# Patient Record
Sex: FEMALE | Race: OTHER | HISPANIC OR LATINO | URBAN - METROPOLITAN AREA
[De-identification: names, ages, dates, MRNs, and addresses within clinical notes are randomized per-mention and may not be internally consistent; named-entity substitution may affect disease eponyms.]

---

## 2017-03-25 ENCOUNTER — EMERGENCY (EMERGENCY)
Facility: HOSPITAL | Age: 42
LOS: 1 days | Discharge: HOME | End: 2017-03-25
Attending: EMERGENCY MEDICINE
Payer: MEDICAID

## 2017-03-25 VITALS
DIASTOLIC BLOOD PRESSURE: 71 MMHG | HEART RATE: 98 BPM | TEMPERATURE: 99 F | SYSTOLIC BLOOD PRESSURE: 135 MMHG | OXYGEN SATURATION: 98 % | RESPIRATION RATE: 18 BRPM

## 2017-03-25 VITALS
HEART RATE: 93 BPM | DIASTOLIC BLOOD PRESSURE: 81 MMHG | RESPIRATION RATE: 20 BRPM | SYSTOLIC BLOOD PRESSURE: 132 MMHG | TEMPERATURE: 98 F | WEIGHT: 190.04 LBS | OXYGEN SATURATION: 100 % | HEIGHT: 69 IN

## 2017-03-25 LAB
ALBUMIN SERPL ELPH-MCNC: 4 G/DL — SIGNIFICANT CHANGE UP (ref 3.5–5)
ALP SERPL-CCNC: 52 U/L — SIGNIFICANT CHANGE UP (ref 40–120)
ALT FLD-CCNC: 23 U/L DA — SIGNIFICANT CHANGE UP (ref 10–60)
ANION GAP SERPL CALC-SCNC: 8 MMOL/L — SIGNIFICANT CHANGE UP (ref 5–17)
APTT BLD: 25.6 SEC — LOW (ref 27.5–37.4)
AST SERPL-CCNC: 20 U/L — SIGNIFICANT CHANGE UP (ref 10–40)
BASOPHILS # BLD AUTO: 0.1 K/UL — SIGNIFICANT CHANGE UP (ref 0–0.2)
BASOPHILS NFR BLD AUTO: 1 % — SIGNIFICANT CHANGE UP (ref 0–2)
BILIRUB SERPL-MCNC: 0.2 MG/DL — SIGNIFICANT CHANGE UP (ref 0.2–1.2)
BUN SERPL-MCNC: 13 MG/DL — SIGNIFICANT CHANGE UP (ref 7–18)
CALCIUM SERPL-MCNC: 9.3 MG/DL — SIGNIFICANT CHANGE UP (ref 8.4–10.5)
CHLORIDE SERPL-SCNC: 107 MMOL/L — SIGNIFICANT CHANGE UP (ref 96–108)
CO2 SERPL-SCNC: 27 MMOL/L — SIGNIFICANT CHANGE UP (ref 22–31)
CREAT SERPL-MCNC: 0.89 MG/DL — SIGNIFICANT CHANGE UP (ref 0.5–1.3)
EOSINOPHIL # BLD AUTO: 0 K/UL — SIGNIFICANT CHANGE UP (ref 0–0.5)
EOSINOPHIL NFR BLD AUTO: 0.5 % — SIGNIFICANT CHANGE UP (ref 0–6)
GLUCOSE SERPL-MCNC: 127 MG/DL — HIGH (ref 70–99)
HCT VFR BLD CALC: 36.9 % — SIGNIFICANT CHANGE UP (ref 34.5–45)
HGB BLD-MCNC: 11.3 G/DL — LOW (ref 11.5–15.5)
INR BLD: 1.11 RATIO — SIGNIFICANT CHANGE UP (ref 0.88–1.16)
LYMPHOCYTES # BLD AUTO: 0.5 K/UL — LOW (ref 1–3.3)
LYMPHOCYTES # BLD AUTO: 10.6 % — LOW (ref 13–44)
MCHC RBC-ENTMCNC: 25.5 PG — LOW (ref 27–34)
MCHC RBC-ENTMCNC: 30.7 GM/DL — LOW (ref 32–36)
MCV RBC AUTO: 82.9 FL — SIGNIFICANT CHANGE UP (ref 80–100)
MONOCYTES # BLD AUTO: 0.3 K/UL — SIGNIFICANT CHANGE UP (ref 0–0.9)
MONOCYTES NFR BLD AUTO: 5.7 % — SIGNIFICANT CHANGE UP (ref 2–14)
NEUTROPHILS # BLD AUTO: 4.2 K/UL — SIGNIFICANT CHANGE UP (ref 1.8–7.4)
NEUTROPHILS NFR BLD AUTO: 82.1 % — HIGH (ref 43–77)
PLATELET # BLD AUTO: 210 K/UL — SIGNIFICANT CHANGE UP (ref 150–400)
POTASSIUM SERPL-MCNC: 3.7 MMOL/L — SIGNIFICANT CHANGE UP (ref 3.5–5.3)
POTASSIUM SERPL-SCNC: 3.7 MMOL/L — SIGNIFICANT CHANGE UP (ref 3.5–5.3)
PROT SERPL-MCNC: 7.6 G/DL — SIGNIFICANT CHANGE UP (ref 6–8.3)
PROTHROM AB SERPL-ACNC: 12.1 SEC — SIGNIFICANT CHANGE UP (ref 9.8–12.7)
RAPID RVP RESULT: SIGNIFICANT CHANGE UP
RBC # BLD: 4.45 M/UL — SIGNIFICANT CHANGE UP (ref 3.8–5.2)
RBC # FLD: 15.5 % — HIGH (ref 10.3–14.5)
SODIUM SERPL-SCNC: 142 MMOL/L — SIGNIFICANT CHANGE UP (ref 135–145)
WBC # BLD: 5.1 K/UL — SIGNIFICANT CHANGE UP (ref 3.8–10.5)
WBC # FLD AUTO: 5.1 K/UL — SIGNIFICANT CHANGE UP (ref 3.8–10.5)

## 2017-03-25 PROCEDURE — 99285 EMERGENCY DEPT VISIT HI MDM: CPT

## 2017-03-25 PROCEDURE — 71020: CPT | Mod: 26

## 2017-03-25 RX ORDER — IPRATROPIUM/ALBUTEROL SULFATE 18-103MCG
3 AEROSOL WITH ADAPTER (GRAM) INHALATION ONCE
Qty: 0 | Refills: 0 | Status: COMPLETED | OUTPATIENT
Start: 2017-03-25 | End: 2017-03-25

## 2017-03-25 RX ORDER — MAGNESIUM SULFATE 500 MG/ML
2 VIAL (ML) INJECTION ONCE
Qty: 0 | Refills: 0 | Status: COMPLETED | OUTPATIENT
Start: 2017-03-25 | End: 2017-03-25

## 2017-03-25 RX ADMIN — Medication 3 MILLILITER(S): at 21:18

## 2017-03-25 RX ADMIN — Medication 50 GRAM(S): at 21:18

## 2017-03-25 RX ADMIN — Medication 60 MILLIGRAM(S): at 21:18

## 2017-03-25 RX ADMIN — Medication 3 MILLILITER(S): at 21:19

## 2017-03-25 NOTE — ED PROVIDER NOTE - OBJECTIVE STATEMENT
42 y/o F w/ PMHx of Asthma (never intubated; uses Albuterol) presents to the ED c/o difficulty breathing onset yesterday. Pt also notes coughing, wheezing, & fever. Pt [facility] where she was given a nebulizer, 4 mg of Decadron & sent to ED. Pt is A&Ox3 in ED. Pt denies chest pain, leg swelling, oral contraceptive use, recent travel, positive sick contact or any other complaints. NKDA.

## 2017-03-25 NOTE — ED ADULT NURSE NOTE - OBJECTIVE STATEMENT
Patient complain of difficulty breathing since last night. Went to urgent care today and was sent to ED. Wheezing on arrival, complain of SOB and chest discomfort.

## 2017-03-25 NOTE — ED PROVIDER NOTE - NS ED MD SCRIBE ATTENDING SCRIBE SECTIONS
REVIEW OF SYSTEMS/PHYSICAL EXAM/HISTORY OF PRESENT ILLNESS/VITAL SIGNS( Pullset)/PAST MEDICAL/SURGICAL/SOCIAL HISTORY/HIV/DISPOSITION

## 2017-03-25 NOTE — ED PROVIDER NOTE - MEDICAL DECISION MAKING DETAILS
42 y/o F w/ difficulty breathing onset today. Will get labs, flu panel, CXR, duo-neb, steroids, & DC home.

## 2017-03-26 PROCEDURE — 87633 RESP VIRUS 12-25 TARGETS: CPT

## 2017-03-26 PROCEDURE — 87486 CHLMYD PNEUM DNA AMP PROBE: CPT

## 2017-03-26 PROCEDURE — 87581 M.PNEUMON DNA AMP PROBE: CPT

## 2017-03-26 PROCEDURE — 80053 COMPREHEN METABOLIC PANEL: CPT

## 2017-03-26 PROCEDURE — 85027 COMPLETE CBC AUTOMATED: CPT

## 2017-03-26 PROCEDURE — 85730 THROMBOPLASTIN TIME PARTIAL: CPT

## 2017-03-26 PROCEDURE — 94640 AIRWAY INHALATION TREATMENT: CPT

## 2017-03-26 PROCEDURE — 87798 DETECT AGENT NOS DNA AMP: CPT

## 2017-03-26 PROCEDURE — 99284 EMERGENCY DEPT VISIT MOD MDM: CPT | Mod: 25

## 2017-03-26 PROCEDURE — 71046 X-RAY EXAM CHEST 2 VIEWS: CPT

## 2017-03-26 PROCEDURE — 96374 THER/PROPH/DIAG INJ IV PUSH: CPT

## 2017-03-26 PROCEDURE — 85610 PROTHROMBIN TIME: CPT

## 2017-03-26 RX ORDER — PSEUDOEPHEDRINE HCL 30 MG
2 TABLET ORAL
Qty: 24 | Refills: 0
Start: 2017-03-26 | End: 2017-03-31

## 2017-03-26 RX ORDER — IBUPROFEN 200 MG
400 TABLET ORAL ONCE
Qty: 0 | Refills: 0 | Status: COMPLETED | OUTPATIENT
Start: 2017-03-26 | End: 2017-03-26

## 2017-03-26 RX ORDER — IBUPROFEN 200 MG
2 TABLET ORAL
Qty: 20 | Refills: 0
Start: 2017-03-26 | End: 2017-03-31

## 2017-03-26 RX ORDER — PSEUDOEPHEDRINE HCL 30 MG
60 TABLET ORAL ONCE
Qty: 0 | Refills: 0 | Status: COMPLETED | OUTPATIENT
Start: 2017-03-26 | End: 2017-03-26

## 2017-03-26 RX ORDER — DIPHENHYDRAMINE HCL 50 MG
25 CAPSULE ORAL ONCE
Qty: 0 | Refills: 0 | Status: COMPLETED | OUTPATIENT
Start: 2017-03-26 | End: 2017-03-26

## 2017-03-26 RX ORDER — DIPHENHYDRAMINE HCL 50 MG
1 CAPSULE ORAL
Qty: 12 | Refills: 0
Start: 2017-03-26 | End: 2017-03-31

## 2017-03-26 RX ORDER — ALBUTEROL 90 UG/1
2 AEROSOL, METERED ORAL
Qty: 1 | Refills: 0
Start: 2017-03-26 | End: 2017-04-25

## 2017-03-26 RX ADMIN — Medication 60 MILLIGRAM(S): at 01:06

## 2017-03-26 RX ADMIN — Medication 25 MILLIGRAM(S): at 01:12

## 2017-03-26 RX ADMIN — Medication 400 MILLIGRAM(S): at 01:06

## 2017-03-26 RX ADMIN — Medication 100 MILLIGRAM(S): at 01:09

## 2017-03-29 DIAGNOSIS — J06.9 ACUTE UPPER RESPIRATORY INFECTION, UNSPECIFIED: ICD-10-CM

## 2017-03-29 DIAGNOSIS — J45.901 UNSPECIFIED ASTHMA WITH (ACUTE) EXACERBATION: ICD-10-CM

## 2017-03-31 ENCOUNTER — APPOINTMENT (OUTPATIENT)
Dept: INTERNAL MEDICINE | Facility: CLINIC | Age: 42
End: 2017-03-31

## 2017-03-31 VITALS
HEIGHT: 69 IN | SYSTOLIC BLOOD PRESSURE: 120 MMHG | WEIGHT: 186 LBS | BODY MASS INDEX: 27.55 KG/M2 | HEART RATE: 105 BPM | DIASTOLIC BLOOD PRESSURE: 78 MMHG | RESPIRATION RATE: 17 BRPM | TEMPERATURE: 98.8 F

## 2017-03-31 RX ORDER — PREDNISONE 50 MG/1
50 TABLET ORAL
Qty: 5 | Refills: 0 | Status: DISCONTINUED | COMMUNITY
Start: 2017-03-26 | End: 2017-03-31

## 2017-03-31 RX ORDER — CEFUROXIME AXETIL 250 MG/1
250 TABLET ORAL
Qty: 6 | Refills: 0 | Status: DISCONTINUED | COMMUNITY
Start: 2017-03-30 | End: 2017-03-31

## 2017-03-31 RX ORDER — IPRATROPIUM BROMIDE AND ALBUTEROL SULFATE 2.5; .5 MG/3ML; MG/3ML
0.5-2.5 (3) SOLUTION RESPIRATORY (INHALATION)
Qty: 360 | Refills: 0 | Status: DISCONTINUED | COMMUNITY
Start: 2017-03-30 | End: 2017-03-31

## 2017-03-31 RX ORDER — IBUPROFEN 200 MG
200 TABLET ORAL
Qty: 20 | Refills: 0 | Status: DISCONTINUED | COMMUNITY
Start: 2017-03-26 | End: 2017-03-31

## 2017-03-31 RX ORDER — PSEUDOEPHEDRINE HCL 30 MG/1
30 TABLET, FILM COATED ORAL
Qty: 24 | Refills: 0 | Status: DISCONTINUED | COMMUNITY
Start: 2017-03-26 | End: 2017-03-31

## 2017-04-21 ENCOUNTER — APPOINTMENT (OUTPATIENT)
Dept: INTERNAL MEDICINE | Facility: CLINIC | Age: 42
End: 2017-04-21

## 2017-05-02 ENCOUNTER — APPOINTMENT (OUTPATIENT)
Dept: INTERNAL MEDICINE | Facility: CLINIC | Age: 42
End: 2017-05-02

## 2017-05-02 VITALS
HEIGHT: 69 IN | TEMPERATURE: 98.1 F | RESPIRATION RATE: 18 BRPM | WEIGHT: 190 LBS | SYSTOLIC BLOOD PRESSURE: 120 MMHG | DIASTOLIC BLOOD PRESSURE: 70 MMHG | HEART RATE: 71 BPM | BODY MASS INDEX: 28.14 KG/M2 | OXYGEN SATURATION: 99 %

## 2017-05-02 RX ORDER — OSELTAMIVIR PHOSPHATE 75 MG/1
75 CAPSULE ORAL
Qty: 3 | Refills: 0 | Status: DISCONTINUED | COMMUNITY
Start: 2017-03-30 | End: 2017-05-02

## 2017-05-02 RX ORDER — AZITHROMYCIN 250 MG/1
250 TABLET, FILM COATED ORAL
Qty: 1 | Refills: 0 | Status: DISCONTINUED | COMMUNITY
Start: 2017-03-30 | End: 2017-05-02

## 2017-05-02 RX ORDER — PREDNISONE 10 MG/1
10 TABLET ORAL
Qty: 21 | Refills: 0 | Status: DISCONTINUED | COMMUNITY
Start: 2017-03-31 | End: 2017-05-02

## 2017-05-02 RX ORDER — BENZONATATE 100 MG/1
100 CAPSULE ORAL
Qty: 20 | Refills: 0 | Status: DISCONTINUED | COMMUNITY
Start: 2017-03-26 | End: 2017-05-02

## 2017-05-02 RX ORDER — DEXTROMETHORPHAN HYDROBROMIDE AND GUAIFENESIN 10; 100 MG/5ML; MG/5ML
100-10 SOLUTION ORAL
Qty: 210 | Refills: 2 | Status: DISCONTINUED | COMMUNITY
Start: 2017-03-31 | End: 2017-05-02

## 2017-07-07 ENCOUNTER — APPOINTMENT (OUTPATIENT)
Dept: PULMONOLOGY | Facility: CLINIC | Age: 42
End: 2017-07-07

## 2017-07-19 ENCOUNTER — CLINICAL ADVICE (OUTPATIENT)
Age: 42
End: 2017-07-19

## 2017-07-19 RX ORDER — METHYLPREDNISOLONE 4 MG/1
4 TABLET ORAL
Qty: 21 | Refills: 0 | Status: DISCONTINUED | COMMUNITY
Start: 2017-04-02 | End: 2017-07-19

## 2017-07-19 RX ORDER — AZITHROMYCIN 500 MG/1
500 TABLET, FILM COATED ORAL
Qty: 3 | Refills: 0 | Status: DISCONTINUED | COMMUNITY
Start: 2017-04-02 | End: 2017-07-19

## 2017-07-19 RX ORDER — EFINACONAZOLE 100 MG/ML
10 SOLUTION TOPICAL
Qty: 4 | Refills: 0 | Status: DISCONTINUED | COMMUNITY
Start: 2017-05-16 | End: 2017-07-19

## 2017-07-19 RX ORDER — POLYETHYLENE GLYCOL 3350 AND ELECTROLYTES WITH LEMON FLAVOR 236; 22.74; 6.74; 5.86; 2.97 G/4L; G/4L; G/4L; G/4L; G/4L
236 POWDER, FOR SOLUTION ORAL
Qty: 4000 | Refills: 0 | Status: DISCONTINUED | COMMUNITY
Start: 2017-01-26 | End: 2017-07-19

## 2017-07-19 RX ORDER — BISACODYL 5 MG/1
5 TABLET ORAL
Qty: 4 | Refills: 0 | Status: DISCONTINUED | COMMUNITY
Start: 2017-01-26 | End: 2017-07-19

## 2017-07-19 RX ORDER — LULICONAZOLE 10 MG/G
1 CREAM TOPICAL
Qty: 60 | Refills: 0 | Status: DISCONTINUED | COMMUNITY
Start: 2017-05-16 | End: 2017-07-19

## 2017-07-19 RX ORDER — TERBINAFINE HYDROCHLORIDE 250 MG/1
250 TABLET ORAL
Qty: 7 | Refills: 0 | Status: DISCONTINUED | COMMUNITY
Start: 2017-05-16 | End: 2017-07-19

## 2017-07-19 RX ORDER — POLYETHYLENE GLYCOL 3350 17 G/17G
17 POWDER, FOR SOLUTION ORAL
Qty: 527 | Refills: 0 | Status: DISCONTINUED | COMMUNITY
Start: 2017-01-21 | End: 2017-07-19

## 2017-07-24 ENCOUNTER — LABORATORY RESULT (OUTPATIENT)
Age: 42
End: 2017-07-24

## 2017-07-24 ENCOUNTER — CLINICAL ADVICE (OUTPATIENT)
Age: 42
End: 2017-07-24

## 2017-07-25 LAB
25(OH)D3 SERPL-MCNC: 18.6 NG/ML
ALBUMIN SERPL ELPH-MCNC: 4.3 G/DL
ALP BLD-CCNC: 45 U/L
ALT SERPL-CCNC: 11 U/L
ANION GAP SERPL CALC-SCNC: 18 MMOL/L
APPEARANCE: ABNORMAL
AST SERPL-CCNC: 14 U/L
BASOPHILS # BLD AUTO: 0.03 K/UL
BASOPHILS NFR BLD AUTO: 0.6 %
BILIRUB SERPL-MCNC: 0.3 MG/DL
BILIRUBIN URINE: ABNORMAL
BLOOD URINE: NEGATIVE
BUN SERPL-MCNC: 13 MG/DL
CALCIUM SERPL-MCNC: 10.2 MG/DL
CHLORIDE SERPL-SCNC: 101 MMOL/L
CHOLEST SERPL-MCNC: 184 MG/DL
CHOLEST/HDLC SERPL: 3.6 RATIO
CO2 SERPL-SCNC: 21 MMOL/L
COLOR: ABNORMAL
CREAT SERPL-MCNC: 0.79 MG/DL
CREAT SPEC-SCNC: 279 MG/DL
EOSINOPHIL # BLD AUTO: 0.11 K/UL
EOSINOPHIL NFR BLD AUTO: 2.2 %
ERYTHROCYTE [SEDIMENTATION RATE] IN BLOOD BY WESTERGREN METHOD: 33 MM/HR
FOLATE SERPL-MCNC: 12.4 NG/ML
GGT SERPL-CCNC: 10 U/L
GLUCOSE QUALITATIVE U: NORMAL MG/DL
GLUCOSE SERPL-MCNC: 99 MG/DL
HBA1C MFR BLD HPLC: 5.2 %
HCT VFR BLD CALC: 33.5 %
HDLC SERPL-MCNC: 51 MG/DL
HGB BLD-MCNC: 10.2 G/DL
IMM GRANULOCYTES NFR BLD AUTO: 0.2 %
IRON SATN MFR SERPL: 7 %
IRON SERPL-MCNC: 29 UG/DL
KETONES URINE: NEGATIVE
LDLC SERPL CALC-MCNC: 116 MG/DL
LEUKOCYTE ESTERASE URINE: NEGATIVE
LYMPHOCYTES # BLD AUTO: 1.56 K/UL
LYMPHOCYTES NFR BLD AUTO: 31.2 %
MAN DIFF?: NORMAL
MCHC RBC-ENTMCNC: 23.4 PG
MCHC RBC-ENTMCNC: 30.4 GM/DL
MCV RBC AUTO: 77 FL
MICROALBUMIN 24H UR DL<=1MG/L-MCNC: 3.8 MG/DL
MICROALBUMIN/CREAT 24H UR-RTO: 14 MG/G
MONOCYTES # BLD AUTO: 0.43 K/UL
MONOCYTES NFR BLD AUTO: 8.6 %
NEUTROPHILS # BLD AUTO: 2.86 K/UL
NEUTROPHILS NFR BLD AUTO: 57.2 %
NITRITE URINE: NEGATIVE
PH URINE: 5.5
PLATELET # BLD AUTO: 222 K/UL
POTASSIUM SERPL-SCNC: 4 MMOL/L
PROT SERPL-MCNC: 7.1 G/DL
PROTEIN URINE: ABNORMAL MG/DL
RBC # BLD: 4.35 M/UL
RBC # FLD: 16.3 %
SODIUM SERPL-SCNC: 140 MMOL/L
SPECIFIC GRAVITY URINE: 1.03
T3 SERPL-MCNC: 118 NG/DL
T4 FREE SERPL-MCNC: 1.1 NG/DL
TIBC SERPL-MCNC: 407 UG/DL
TRIGL SERPL-MCNC: 84 MG/DL
TSH SERPL-ACNC: 1.54 UIU/ML
UIBC SERPL-MCNC: 378 UG/DL
UROBILINOGEN URINE: 1 MG/DL
VIT B12 SERPL-MCNC: 476 PG/ML
WBC # FLD AUTO: 5 K/UL

## 2017-07-27 ENCOUNTER — RX RENEWAL (OUTPATIENT)
Age: 42
End: 2017-07-27

## 2017-08-02 ENCOUNTER — APPOINTMENT (OUTPATIENT)
Dept: OBGYN | Facility: CLINIC | Age: 42
End: 2017-08-02

## 2017-09-01 ENCOUNTER — APPOINTMENT (OUTPATIENT)
Dept: PULMONOLOGY | Facility: CLINIC | Age: 42
End: 2017-09-01

## 2017-11-03 ENCOUNTER — APPOINTMENT (OUTPATIENT)
Dept: INTERNAL MEDICINE | Facility: CLINIC | Age: 42
End: 2017-11-03
Payer: MEDICAID

## 2017-11-03 VITALS
RESPIRATION RATE: 16 BRPM | WEIGHT: 183 LBS | HEIGHT: 69 IN | DIASTOLIC BLOOD PRESSURE: 82 MMHG | SYSTOLIC BLOOD PRESSURE: 120 MMHG | HEART RATE: 70 BPM | TEMPERATURE: 98.9 F | BODY MASS INDEX: 27.11 KG/M2 | OXYGEN SATURATION: 99 %

## 2017-11-03 PROCEDURE — 99214 OFFICE O/P EST MOD 30 MIN: CPT

## 2017-11-14 NOTE — ED ADULT NURSE NOTE - NS PRO PASSIVE SMOKE EXP
"Subjective:     31 year old  at 37w0d presents for routine prenatal visit.            Denies vaginal bleeding or leakage of fluid.  Denies contractions.  Reports decreased  fetal movement over the weekend, reports feeling movement now.  Did report that she started URI symptoms last week, decreased fetal movement during that time.  Does feel like URI symptoms are resolving..        No HA, visual changes, RUQ or epigastric pain.     Objective:  Vitals:    17 1326   BP: 116/74   Pulse: 102   Weight: 56.8 kg (125 lb 4.8 oz)   Height: 1.664 m (5' 5.51\")    See OB flowsheet  Assessment/Plan     Encounter Diagnoses   Name Primary?     HRP (high risk pregnancy), third trimester Yes     Decreased fetal movements in third trimester, single or unspecified fetus      Orders Placed This Encounter   Procedures     NST, Single (In Clinic)     -EFM applied and Reactive NST obtained.  Baseline 45 with accels to 160.  Category 1 tracing.    -Reviewed negative GBS results with patient today.  - Reviewed why/how to contact provider if headache/visual changes/RUQ or epigastric pain, decreased fetal movement, vaginal bleeding, leakage of fluid or strong/regular contractions.   Patient education/orders or handouts today:  Sign/symptoms of labor and When to call for labor or other concerns  Return to clinic in 1 week and prn if questions or concerns.   MARCIN Rock CNM    " No

## 2017-12-01 ENCOUNTER — APPOINTMENT (OUTPATIENT)
Dept: INTERNAL MEDICINE | Facility: CLINIC | Age: 42
End: 2017-12-01

## 2017-12-21 ENCOUNTER — RESULT REVIEW (OUTPATIENT)
Age: 42
End: 2017-12-21

## 2017-12-21 ENCOUNTER — APPOINTMENT (OUTPATIENT)
Dept: OBGYN | Facility: CLINIC | Age: 42
End: 2017-12-21
Payer: MEDICAID

## 2017-12-21 ENCOUNTER — OUTPATIENT (OUTPATIENT)
Dept: OUTPATIENT SERVICES | Facility: HOSPITAL | Age: 42
LOS: 1 days | End: 2017-12-21
Payer: MEDICAID

## 2017-12-21 VITALS
DIASTOLIC BLOOD PRESSURE: 79 MMHG | TEMPERATURE: 98.7 F | SYSTOLIC BLOOD PRESSURE: 119 MMHG | HEART RATE: 77 BPM | BODY MASS INDEX: 27.25 KG/M2 | WEIGHT: 184 LBS | HEIGHT: 69 IN

## 2017-12-21 DIAGNOSIS — Z86.018 PERSONAL HISTORY OF OTHER BENIGN NEOPLASM: ICD-10-CM

## 2017-12-21 DIAGNOSIS — Z00.00 ENCOUNTER FOR GENERAL ADULT MEDICAL EXAMINATION WITHOUT ABNORMAL FINDINGS: ICD-10-CM

## 2017-12-21 DIAGNOSIS — Z87.2 PERSONAL HISTORY OF DISEASES OF THE SKIN AND SUBCUTANEOUS TISSUE: ICD-10-CM

## 2017-12-21 PROCEDURE — 84439 ASSAY OF FREE THYROXINE: CPT

## 2017-12-21 PROCEDURE — 87624 HPV HI-RISK TYP POOLED RSLT: CPT

## 2017-12-21 PROCEDURE — G0463: CPT

## 2017-12-21 PROCEDURE — 84443 ASSAY THYROID STIM HORMONE: CPT

## 2017-12-21 PROCEDURE — 99203 OFFICE O/P NEW LOW 30 MIN: CPT | Mod: 25

## 2017-12-21 PROCEDURE — 84481 FREE ASSAY (FT-3): CPT

## 2017-12-21 RX ORDER — PREDNISONE 10 MG/1
10 TABLET ORAL
Qty: 21 | Refills: 0 | Status: DISCONTINUED | COMMUNITY
Start: 2017-03-30 | End: 2017-12-21

## 2017-12-21 RX ORDER — PROMETHAZINE HYDROCHLORIDE 6.25 MG/5ML
6.25 SOLUTION ORAL
Qty: 1 | Refills: 5 | Status: DISCONTINUED | COMMUNITY
Start: 2017-07-19 | End: 2017-12-21

## 2017-12-21 RX ORDER — FLUTICASONE FUROATE AND VILANTEROL TRIFENATATE 100; 25 UG/1; UG/1
100-25 POWDER RESPIRATORY (INHALATION) DAILY
Qty: 1 | Refills: 5 | Status: DISCONTINUED | COMMUNITY
Start: 2017-03-31 | End: 2017-12-21

## 2017-12-22 DIAGNOSIS — R10.2 PELVIC AND PERINEAL PAIN: ICD-10-CM

## 2017-12-22 LAB
C TRACH RRNA SPEC QL NAA+PROBE: SIGNIFICANT CHANGE UP
HPV HIGH+LOW RISK DNA PNL CVX: SIGNIFICANT CHANGE UP
N GONORRHOEA RRNA SPEC QL NAA+PROBE: SIGNIFICANT CHANGE UP
SPECIMEN SOURCE: SIGNIFICANT CHANGE UP
T3FREE SERPL-MCNC: 2.42 PG/ML — SIGNIFICANT CHANGE UP (ref 1.8–4.6)
T4 FREE SERPL-MCNC: 1 NG/DL — SIGNIFICANT CHANGE UP (ref 0.9–1.8)
TSH SERPL-MCNC: 3.08 UIU/ML — SIGNIFICANT CHANGE UP (ref 0.27–4.2)

## 2017-12-27 LAB — CYTOLOGY SPEC DOC CYTO: SIGNIFICANT CHANGE UP

## 2018-01-26 ENCOUNTER — APPOINTMENT (OUTPATIENT)
Dept: ULTRASOUND IMAGING | Facility: HOSPITAL | Age: 43
End: 2018-01-26

## 2018-03-23 ENCOUNTER — APPOINTMENT (OUTPATIENT)
Dept: INTERNAL MEDICINE | Facility: CLINIC | Age: 43
End: 2018-03-23
Payer: MEDICAID

## 2018-04-11 ENCOUNTER — CLINICAL ADVICE (OUTPATIENT)
Age: 43
End: 2018-04-11

## 2018-04-11 ENCOUNTER — MOBILE ON CALL (OUTPATIENT)
Age: 43
End: 2018-04-11

## 2018-04-19 ENCOUNTER — RX RENEWAL (OUTPATIENT)
Age: 43
End: 2018-04-19

## 2018-05-07 ENCOUNTER — APPOINTMENT (OUTPATIENT)
Dept: INTERNAL MEDICINE | Facility: CLINIC | Age: 43
End: 2018-05-07
Payer: MEDICAID

## 2018-05-07 VITALS
WEIGHT: 174 LBS | RESPIRATION RATE: 16 BRPM | BODY MASS INDEX: 25.77 KG/M2 | TEMPERATURE: 98.5 F | OXYGEN SATURATION: 98 % | DIASTOLIC BLOOD PRESSURE: 80 MMHG | HEART RATE: 75 BPM | SYSTOLIC BLOOD PRESSURE: 120 MMHG | HEIGHT: 69 IN

## 2018-05-07 PROCEDURE — 99396 PREV VISIT EST AGE 40-64: CPT

## 2018-05-15 ENCOUNTER — APPOINTMENT (OUTPATIENT)
Dept: GASTROENTEROLOGY | Facility: CLINIC | Age: 43
End: 2018-05-15
Payer: MEDICAID

## 2018-05-15 VITALS
WEIGHT: 175 LBS | DIASTOLIC BLOOD PRESSURE: 72 MMHG | SYSTOLIC BLOOD PRESSURE: 108 MMHG | TEMPERATURE: 98.3 F | HEIGHT: 69 IN | BODY MASS INDEX: 25.92 KG/M2 | RESPIRATION RATE: 16 BRPM | HEART RATE: 92 BPM

## 2018-05-15 PROCEDURE — 99204 OFFICE O/P NEW MOD 45 MIN: CPT

## 2018-05-16 ENCOUNTER — RX RENEWAL (OUTPATIENT)
Age: 43
End: 2018-05-16

## 2018-05-16 LAB
25(OH)D3 SERPL-MCNC: 15.2 NG/ML
ALBUMIN SERPL ELPH-MCNC: 4.1 G/DL
ALP BLD-CCNC: 47 U/L
ALT SERPL-CCNC: 13 U/L
ANION GAP SERPL CALC-SCNC: 13 MMOL/L
AST SERPL-CCNC: 19 U/L
BASOPHILS # BLD AUTO: 0.03 K/UL
BASOPHILS NFR BLD AUTO: 0.6 %
BILIRUB SERPL-MCNC: 0.3 MG/DL
BUN SERPL-MCNC: 11 MG/DL
CALCIUM SERPL-MCNC: 10 MG/DL
CHLORIDE SERPL-SCNC: 101 MMOL/L
CHOLEST SERPL-MCNC: 197 MG/DL
CHOLEST/HDLC SERPL: 3.6 RATIO
CO2 SERPL-SCNC: 24 MMOL/L
CREAT SERPL-MCNC: 0.64 MG/DL
EOSINOPHIL # BLD AUTO: 0.14 K/UL
EOSINOPHIL NFR BLD AUTO: 2.9 %
ERYTHROCYTE [SEDIMENTATION RATE] IN BLOOD BY WESTERGREN METHOD: 12 MM/HR
FOLATE SERPL-MCNC: 13.3 NG/ML
GGT SERPL-CCNC: 8 U/L
GLUCOSE SERPL-MCNC: 93 MG/DL
HBA1C MFR BLD HPLC: 5 %
HCT VFR BLD CALC: 36.6 %
HDLC SERPL-MCNC: 55 MG/DL
HGB BLD-MCNC: 10.9 G/DL
IMM GRANULOCYTES NFR BLD AUTO: 0 %
IRON SATN MFR SERPL: 7 %
IRON SERPL-MCNC: 29 UG/DL
LDLC SERPL CALC-MCNC: 122 MG/DL
LYMPHOCYTES # BLD AUTO: 1.38 K/UL
LYMPHOCYTES NFR BLD AUTO: 28.9 %
MAN DIFF?: NORMAL
MCHC RBC-ENTMCNC: 25.5 PG
MCHC RBC-ENTMCNC: 29.8 GM/DL
MCV RBC AUTO: 85.5 FL
MONOCYTES # BLD AUTO: 0.43 K/UL
MONOCYTES NFR BLD AUTO: 9 %
NEUTROPHILS # BLD AUTO: 2.79 K/UL
NEUTROPHILS NFR BLD AUTO: 58.6 %
PLATELET # BLD AUTO: 233 K/UL
POTASSIUM SERPL-SCNC: 4.2 MMOL/L
PROT SERPL-MCNC: 7.2 G/DL
RBC # BLD: 4.28 M/UL
RBC # FLD: 18.8 %
SODIUM SERPL-SCNC: 138 MMOL/L
T3 SERPL-MCNC: 113 NG/DL
T4 FREE SERPL-MCNC: 1.2 NG/DL
TIBC SERPL-MCNC: 426 UG/DL
TRIGL SERPL-MCNC: 100 MG/DL
TSH SERPL-ACNC: 1.7 UIU/ML
UIBC SERPL-MCNC: 397 UG/DL
VIT B12 SERPL-MCNC: 567 PG/ML
WBC # FLD AUTO: 4.77 K/UL

## 2018-05-17 LAB
ALBUMIN SERPL ELPH-MCNC: 4.1 G/DL
ALP BLD-CCNC: 47 U/L
ALT SERPL-CCNC: 16 U/L
ANION GAP SERPL CALC-SCNC: 12 MMOL/L
AST SERPL-CCNC: 19 U/L
BASOPHILS # BLD AUTO: 0.04 K/UL
BASOPHILS NFR BLD AUTO: 0.8 %
BILIRUB SERPL-MCNC: 0.3 MG/DL
BUN SERPL-MCNC: 11 MG/DL
CALCIUM SERPL-MCNC: 9.9 MG/DL
CHLORIDE SERPL-SCNC: 102 MMOL/L
CO2 SERPL-SCNC: 24 MMOL/L
CREAT SERPL-MCNC: 0.63 MG/DL
EOSINOPHIL # BLD AUTO: 0.14 K/UL
EOSINOPHIL NFR BLD AUTO: 2.9 %
GLUCOSE SERPL-MCNC: 84 MG/DL
HCT VFR BLD CALC: 36.7 %
HGB BLD-MCNC: 11.1 G/DL
IMM GRANULOCYTES NFR BLD AUTO: 0 %
LYMPHOCYTES # BLD AUTO: 1.36 K/UL
LYMPHOCYTES NFR BLD AUTO: 28.1 %
MAN DIFF?: NORMAL
MCHC RBC-ENTMCNC: 25.9 PG
MCHC RBC-ENTMCNC: 30.2 GM/DL
MCV RBC AUTO: 85.7 FL
MONOCYTES # BLD AUTO: 0.42 K/UL
MONOCYTES NFR BLD AUTO: 8.7 %
NEUTROPHILS # BLD AUTO: 2.88 K/UL
NEUTROPHILS NFR BLD AUTO: 59.5 %
PLATELET # BLD AUTO: 235 K/UL
POTASSIUM SERPL-SCNC: 4.1 MMOL/L
PROT SERPL-MCNC: 7.4 G/DL
RBC # BLD: 4.28 M/UL
RBC # FLD: 18.8 %
SODIUM SERPL-SCNC: 138 MMOL/L
WBC # FLD AUTO: 4.84 K/UL

## 2018-05-25 ENCOUNTER — OUTPATIENT (OUTPATIENT)
Dept: OUTPATIENT SERVICES | Facility: HOSPITAL | Age: 43
LOS: 1 days | End: 2018-05-25
Payer: MEDICAID

## 2018-05-25 ENCOUNTER — RESULT REVIEW (OUTPATIENT)
Age: 43
End: 2018-05-25

## 2018-05-25 DIAGNOSIS — D64.9 ANEMIA, UNSPECIFIED: ICD-10-CM

## 2018-05-25 LAB — HCG UR QL: NEGATIVE — SIGNIFICANT CHANGE UP

## 2018-05-25 PROCEDURE — 81025 URINE PREGNANCY TEST: CPT

## 2018-05-25 PROCEDURE — 45378 DIAGNOSTIC COLONOSCOPY: CPT

## 2018-05-25 PROCEDURE — 88305 TISSUE EXAM BY PATHOLOGIST: CPT | Mod: 26

## 2018-05-25 PROCEDURE — 88312 SPECIAL STAINS GROUP 1: CPT

## 2018-05-25 PROCEDURE — 43239 EGD BIOPSY SINGLE/MULTIPLE: CPT

## 2018-05-25 PROCEDURE — 88305 TISSUE EXAM BY PATHOLOGIST: CPT

## 2018-05-25 PROCEDURE — 88312 SPECIAL STAINS GROUP 1: CPT | Mod: 26

## 2018-08-06 ENCOUNTER — CLINICAL ADVICE (OUTPATIENT)
Age: 43
End: 2018-08-06

## 2018-08-10 ENCOUNTER — CLINICAL ADVICE (OUTPATIENT)
Age: 43
End: 2018-08-10

## 2018-08-12 LAB
DRUG ABUSE PANEL-9, SERUM: NORMAL
HBV CORE IGG+IGM SER QL: NONREACTIVE
HBV SURFACE AB SER QL: NONREACTIVE
HBV SURFACE AG SER QL: NONREACTIVE
HCV AB SER QL: NONREACTIVE
HCV S/CO RATIO: 0.07 S/CO
HEPATITIS A IGG ANTIBODY: NONREACTIVE
M TB IFN-G BLD-IMP: NEGATIVE
MEV IGG FLD QL IA: 33 AU/ML
MEV IGG+IGM SER-IMP: POSITIVE
MUV AB SER-ACNC: POSITIVE
MUV IGG SER QL IA: >300 AU/ML
QUANTIFERON TB PLUS MITOGEN MINUS NIL: >10 IU/ML
QUANTIFERON TB PLUS NIL: 0.03 IU/ML
QUANTIFERON TB PLUS TB1 MINUS NIL: 0.02 IU/ML
QUANTIFERON TB PLUS TB2 MINUS NIL: 0.04 IU/ML
RUBV IGG FLD-ACNC: 2.1 INDEX
RUBV IGG SER-IMP: POSITIVE
VZV AB TITR SER: POSITIVE
VZV IGG SER IF-ACNC: 784.8 INDEX

## 2018-08-17 ENCOUNTER — APPOINTMENT (OUTPATIENT)
Dept: INTERNAL MEDICINE | Facility: CLINIC | Age: 43
End: 2018-08-17
Payer: MEDICAID

## 2018-08-17 PROCEDURE — 90471 IMMUNIZATION ADMIN: CPT

## 2018-08-17 PROCEDURE — 90715 TDAP VACCINE 7 YRS/> IM: CPT

## 2018-08-17 RX ORDER — CLINDAMYCIN HYDROCHLORIDE 300 MG/1
300 CAPSULE ORAL
Qty: 21 | Refills: 0 | Status: DISCONTINUED | COMMUNITY
Start: 2018-03-05 | End: 2018-08-17

## 2018-08-27 ENCOUNTER — RX RENEWAL (OUTPATIENT)
Age: 43
End: 2018-08-27

## 2019-01-18 ENCOUNTER — APPOINTMENT (OUTPATIENT)
Dept: ORTHOPEDIC SURGERY | Facility: CLINIC | Age: 44
End: 2019-01-18

## 2019-04-08 ENCOUNTER — APPOINTMENT (OUTPATIENT)
Dept: OBGYN | Facility: CLINIC | Age: 44
End: 2019-04-08
Payer: MEDICAID

## 2019-04-08 ENCOUNTER — OUTPATIENT (OUTPATIENT)
Dept: OUTPATIENT SERVICES | Facility: HOSPITAL | Age: 44
LOS: 1 days | End: 2019-04-08
Payer: MEDICAID

## 2019-04-08 ENCOUNTER — TRANSCRIPTION ENCOUNTER (OUTPATIENT)
Age: 44
End: 2019-04-08

## 2019-04-08 VITALS
HEART RATE: 85 BPM | BODY MASS INDEX: 25.62 KG/M2 | DIASTOLIC BLOOD PRESSURE: 79 MMHG | SYSTOLIC BLOOD PRESSURE: 132 MMHG | HEIGHT: 69 IN | WEIGHT: 173 LBS

## 2019-04-08 DIAGNOSIS — Z00.00 ENCOUNTER FOR GENERAL ADULT MEDICAL EXAMINATION WITHOUT ABNORMAL FINDINGS: ICD-10-CM

## 2019-04-08 PROCEDURE — 99213 OFFICE O/P EST LOW 20 MIN: CPT

## 2019-04-08 PROCEDURE — 87491 CHLMYD TRACH DNA AMP PROBE: CPT

## 2019-04-08 PROCEDURE — 84146 ASSAY OF PROLACTIN: CPT

## 2019-04-08 PROCEDURE — 86780 TREPONEMA PALLIDUM: CPT

## 2019-04-08 PROCEDURE — 87800 DETECT AGNT MULT DNA DIREC: CPT

## 2019-04-08 PROCEDURE — 36415 COLL VENOUS BLD VENIPUNCTURE: CPT

## 2019-04-08 PROCEDURE — 86803 HEPATITIS C AB TEST: CPT

## 2019-04-08 PROCEDURE — G0463: CPT

## 2019-04-08 PROCEDURE — 87591 N.GONORRHOEAE DNA AMP PROB: CPT

## 2019-04-08 PROCEDURE — 80053 COMPREHEN METABOLIC PANEL: CPT

## 2019-04-08 PROCEDURE — 87389 HIV-1 AG W/HIV-1&-2 AB AG IA: CPT

## 2019-04-08 PROCEDURE — 36415 COLL VENOUS BLD VENIPUNCTURE: CPT | Mod: NC

## 2019-04-08 PROCEDURE — 84439 ASSAY OF FREE THYROXINE: CPT

## 2019-04-08 PROCEDURE — 84443 ASSAY THYROID STIM HORMONE: CPT

## 2019-04-08 PROCEDURE — 87340 HEPATITIS B SURFACE AG IA: CPT

## 2019-04-08 NOTE — PHYSICAL EXAM
[Awake] : awake [Alert] : alert [Acute Distress] : no acute distress [Mass] : no breast mass [Nipple Discharge] : no nipple discharge [Axillary LAD] : no axillary lymphadenopathy [Soft] : soft [Tender] : non tender [Oriented x3] : oriented to person, place, and time [Normal] : uterus [No Bleeding] : there was no active vaginal bleeding [Uterine Adnexae] : were not tender and not enlarged [FreeTextEntry7] : bulky 13wk size uterus, tenderness to palpation on uterus where fibroids are palpated

## 2019-04-09 LAB
ALBUMIN SERPL ELPH-MCNC: 4.3 G/DL — SIGNIFICANT CHANGE UP (ref 3.3–5)
ALP SERPL-CCNC: 39 U/L — LOW (ref 40–120)
ALT FLD-CCNC: 12 U/L — SIGNIFICANT CHANGE UP (ref 10–45)
ANION GAP SERPL CALC-SCNC: 10 MMOL/L — SIGNIFICANT CHANGE UP (ref 5–17)
AST SERPL-CCNC: 22 U/L — SIGNIFICANT CHANGE UP (ref 10–40)
BILIRUB SERPL-MCNC: 0.3 MG/DL — SIGNIFICANT CHANGE UP (ref 0.2–1.2)
BUN SERPL-MCNC: 9 MG/DL — SIGNIFICANT CHANGE UP (ref 7–23)
C TRACH RRNA SPEC QL NAA+PROBE: SIGNIFICANT CHANGE UP
CALCIUM SERPL-MCNC: 10 MG/DL — SIGNIFICANT CHANGE UP (ref 8.4–10.5)
CANDIDA AB TITR SER: SIGNIFICANT CHANGE UP
CHLORIDE SERPL-SCNC: 104 MMOL/L — SIGNIFICANT CHANGE UP (ref 96–108)
CO2 SERPL-SCNC: 24 MMOL/L — SIGNIFICANT CHANGE UP (ref 22–31)
CREAT SERPL-MCNC: 0.6 MG/DL — SIGNIFICANT CHANGE UP (ref 0.5–1.3)
G VAGINALIS DNA SPEC QL NAA+PROBE: SIGNIFICANT CHANGE UP
GLUCOSE SERPL-MCNC: 84 MG/DL — SIGNIFICANT CHANGE UP (ref 70–99)
HBV SURFACE AG SER-ACNC: SIGNIFICANT CHANGE UP
HCV AB S/CO SERPL IA: 0.08 S/CO — SIGNIFICANT CHANGE UP (ref 0–0.99)
HCV AB SERPL-IMP: SIGNIFICANT CHANGE UP
HIV 1+2 AB+HIV1 P24 AG SERPL QL IA: SIGNIFICANT CHANGE UP
N GONORRHOEA RRNA SPEC QL NAA+PROBE: SIGNIFICANT CHANGE UP
POTASSIUM SERPL-MCNC: 4 MMOL/L — SIGNIFICANT CHANGE UP (ref 3.5–5.3)
POTASSIUM SERPL-SCNC: 4 MMOL/L — SIGNIFICANT CHANGE UP (ref 3.5–5.3)
PROLACTIN SERPL-MCNC: 21.8 NG/ML — SIGNIFICANT CHANGE UP (ref 3.4–24.1)
PROT SERPL-MCNC: 7.2 G/DL — SIGNIFICANT CHANGE UP (ref 6–8.3)
SODIUM SERPL-SCNC: 138 MMOL/L — SIGNIFICANT CHANGE UP (ref 135–145)
SPECIMEN SOURCE: SIGNIFICANT CHANGE UP
T PALLIDUM AB TITR SER: NEGATIVE — SIGNIFICANT CHANGE UP
T VAGINALIS SPEC QL WET PREP: SIGNIFICANT CHANGE UP
T4 FREE SERPL-MCNC: 1.1 NG/DL — SIGNIFICANT CHANGE UP (ref 0.9–1.8)
TSH SERPL-MCNC: 1.76 UIU/ML — SIGNIFICANT CHANGE UP (ref 0.27–4.2)

## 2019-04-10 DIAGNOSIS — Z12.31 ENCOUNTER FOR SCREENING MAMMOGRAM FOR MALIGNANT NEOPLASM OF BREAST: ICD-10-CM

## 2019-04-10 DIAGNOSIS — R10.2 PELVIC AND PERINEAL PAIN: ICD-10-CM

## 2019-04-19 ENCOUNTER — OUTPATIENT (OUTPATIENT)
Dept: OUTPATIENT SERVICES | Facility: HOSPITAL | Age: 44
LOS: 1 days | End: 2019-04-19

## 2019-04-19 DIAGNOSIS — Z12.31 ENCOUNTER FOR SCREENING MAMMOGRAM FOR MALIGNANT NEOPLASM OF BREAST: ICD-10-CM

## 2019-04-19 DIAGNOSIS — R10.2 PELVIC AND PERINEAL PAIN: ICD-10-CM

## 2019-04-25 ENCOUNTER — APPOINTMENT (OUTPATIENT)
Dept: OBGYN | Facility: CLINIC | Age: 44
End: 2019-04-25

## 2019-04-25 VITALS
TEMPERATURE: 98 F | HEIGHT: 69 IN | SYSTOLIC BLOOD PRESSURE: 130 MMHG | HEART RATE: 77 BPM | RESPIRATION RATE: 16 BRPM | DIASTOLIC BLOOD PRESSURE: 80 MMHG | OXYGEN SATURATION: 98 %

## 2019-04-30 ENCOUNTER — APPOINTMENT (OUTPATIENT)
Dept: ULTRASOUND IMAGING | Facility: HOSPITAL | Age: 44
End: 2019-04-30

## 2019-04-30 ENCOUNTER — OUTPATIENT (OUTPATIENT)
Dept: OUTPATIENT SERVICES | Facility: HOSPITAL | Age: 44
LOS: 1 days | End: 2019-04-30
Payer: MEDICAID

## 2019-04-30 ENCOUNTER — APPOINTMENT (OUTPATIENT)
Dept: MAMMOGRAPHY | Facility: HOSPITAL | Age: 44
End: 2019-04-30

## 2019-04-30 DIAGNOSIS — R10.2 PELVIC AND PERINEAL PAIN: ICD-10-CM

## 2019-04-30 DIAGNOSIS — Z12.31 ENCOUNTER FOR SCREENING MAMMOGRAM FOR MALIGNANT NEOPLASM OF BREAST: ICD-10-CM

## 2019-04-30 PROCEDURE — 77067 SCR MAMMO BI INCL CAD: CPT

## 2019-04-30 PROCEDURE — 76830 TRANSVAGINAL US NON-OB: CPT

## 2019-04-30 PROCEDURE — 76856 US EXAM PELVIC COMPLETE: CPT

## 2019-05-06 ENCOUNTER — APPOINTMENT (OUTPATIENT)
Dept: OBGYN | Facility: CLINIC | Age: 44
End: 2019-05-06

## 2019-05-07 ENCOUNTER — OUTPATIENT (OUTPATIENT)
Dept: OUTPATIENT SERVICES | Facility: HOSPITAL | Age: 44
LOS: 1 days | End: 2019-05-07
Payer: MEDICAID

## 2019-05-07 ENCOUNTER — APPOINTMENT (OUTPATIENT)
Dept: OBGYN | Facility: CLINIC | Age: 44
End: 2019-05-07
Payer: MEDICAID

## 2019-05-07 VITALS
OXYGEN SATURATION: 99 % | RESPIRATION RATE: 16 BRPM | BODY MASS INDEX: 25.77 KG/M2 | WEIGHT: 174 LBS | HEART RATE: 75 BPM | HEIGHT: 69 IN | DIASTOLIC BLOOD PRESSURE: 70 MMHG | SYSTOLIC BLOOD PRESSURE: 115 MMHG

## 2019-05-07 DIAGNOSIS — Z00.00 ENCOUNTER FOR GENERAL ADULT MEDICAL EXAMINATION WITHOUT ABNORMAL FINDINGS: ICD-10-CM

## 2019-05-07 PROCEDURE — 85027 COMPLETE CBC AUTOMATED: CPT

## 2019-05-07 PROCEDURE — G0463: CPT

## 2019-05-07 PROCEDURE — 36415 COLL VENOUS BLD VENIPUNCTURE: CPT | Mod: NC

## 2019-05-07 PROCEDURE — 36415 COLL VENOUS BLD VENIPUNCTURE: CPT

## 2019-05-07 PROCEDURE — 99213 OFFICE O/P EST LOW 20 MIN: CPT

## 2019-05-07 NOTE — CHIEF COMPLAINT
[Follow Up] : follow up GYN visit [FreeTextEntry1] : follow up after US for evaluation of pelvic pain and heavy periods

## 2019-05-08 DIAGNOSIS — N92.0 EXCESSIVE AND FREQUENT MENSTRUATION WITH REGULAR CYCLE: ICD-10-CM

## 2019-05-08 DIAGNOSIS — R10.2 PELVIC AND PERINEAL PAIN: ICD-10-CM

## 2019-05-08 LAB
BASOPHILS # BLD AUTO: 0.05 K/UL — SIGNIFICANT CHANGE UP (ref 0–0.2)
BASOPHILS NFR BLD AUTO: 1 % — SIGNIFICANT CHANGE UP (ref 0–2)
EOSINOPHIL # BLD AUTO: 0.14 K/UL — SIGNIFICANT CHANGE UP (ref 0–0.5)
EOSINOPHIL NFR BLD AUTO: 2.9 % — SIGNIFICANT CHANGE UP (ref 0–6)
HCT VFR BLD CALC: 32.1 % — LOW (ref 34.5–45)
HGB BLD-MCNC: 8.6 G/DL — LOW (ref 11.5–15.5)
IMM GRANULOCYTES NFR BLD AUTO: 0 % — SIGNIFICANT CHANGE UP (ref 0–1.5)
LYMPHOCYTES # BLD AUTO: 1.5 K/UL — SIGNIFICANT CHANGE UP (ref 1–3.3)
LYMPHOCYTES # BLD AUTO: 30.7 % — SIGNIFICANT CHANGE UP (ref 13–44)
MCHC RBC-ENTMCNC: 21.3 PG — LOW (ref 27–34)
MCHC RBC-ENTMCNC: 26.8 GM/DL — LOW (ref 32–36)
MCV RBC AUTO: 79.5 FL — LOW (ref 80–100)
MONOCYTES # BLD AUTO: 0.52 K/UL — SIGNIFICANT CHANGE UP (ref 0–0.9)
MONOCYTES NFR BLD AUTO: 10.7 % — SIGNIFICANT CHANGE UP (ref 2–14)
NEUTROPHILS # BLD AUTO: 2.67 K/UL — SIGNIFICANT CHANGE UP (ref 1.8–7.4)
NEUTROPHILS NFR BLD AUTO: 54.7 % — SIGNIFICANT CHANGE UP (ref 43–77)
PLATELET # BLD AUTO: 198 K/UL — SIGNIFICANT CHANGE UP (ref 150–400)
RBC # BLD: 4.04 M/UL — SIGNIFICANT CHANGE UP (ref 3.8–5.2)
RBC # FLD: 19.4 % — HIGH (ref 10.3–14.5)
WBC # BLD: 4.88 K/UL — SIGNIFICANT CHANGE UP (ref 3.8–10.5)
WBC # FLD AUTO: 4.88 K/UL — SIGNIFICANT CHANGE UP (ref 3.8–10.5)

## 2019-05-14 ENCOUNTER — APPOINTMENT (OUTPATIENT)
Dept: MAMMOGRAPHY | Facility: HOSPITAL | Age: 44
End: 2019-05-14
Payer: MEDICAID

## 2019-05-14 ENCOUNTER — OUTPATIENT (OUTPATIENT)
Dept: OUTPATIENT SERVICES | Facility: HOSPITAL | Age: 44
LOS: 1 days | End: 2019-05-14
Payer: MEDICAID

## 2019-05-14 DIAGNOSIS — D24.9 BENIGN NEOPLASM OF UNSPECIFIED BREAST: ICD-10-CM

## 2019-05-14 DIAGNOSIS — Z12.31 ENCOUNTER FOR SCREENING MAMMOGRAM FOR MALIGNANT NEOPLASM OF BREAST: ICD-10-CM

## 2019-05-14 PROCEDURE — G0279: CPT

## 2019-05-14 PROCEDURE — 77066 DX MAMMO INCL CAD BI: CPT | Mod: 26

## 2019-05-14 PROCEDURE — 77066 DX MAMMO INCL CAD BI: CPT

## 2019-05-14 PROCEDURE — 76641 ULTRASOUND BREAST COMPLETE: CPT

## 2019-05-14 PROCEDURE — G0279: CPT | Mod: 26

## 2019-05-14 PROCEDURE — 76641 ULTRASOUND BREAST COMPLETE: CPT | Mod: 26,50

## 2019-05-14 PROCEDURE — 77062 BREAST TOMOSYNTHESIS BI: CPT | Mod: 26

## 2019-05-17 ENCOUNTER — APPOINTMENT (OUTPATIENT)
Dept: OBGYN | Facility: CLINIC | Age: 44
End: 2019-05-17

## 2019-05-18 ENCOUNTER — APPOINTMENT (OUTPATIENT)
Dept: INTERNAL MEDICINE | Facility: CLINIC | Age: 44
End: 2019-05-18

## 2019-05-23 ENCOUNTER — OUTPATIENT (OUTPATIENT)
Dept: OUTPATIENT SERVICES | Facility: HOSPITAL | Age: 44
LOS: 1 days | End: 2019-05-23
Payer: MEDICAID

## 2019-05-23 ENCOUNTER — RESULT REVIEW (OUTPATIENT)
Age: 44
End: 2019-05-23

## 2019-05-23 ENCOUNTER — APPOINTMENT (OUTPATIENT)
Dept: ULTRASOUND IMAGING | Facility: IMAGING CENTER | Age: 44
End: 2019-05-23
Payer: MEDICAID

## 2019-05-23 DIAGNOSIS — Z12.31 ENCOUNTER FOR SCREENING MAMMOGRAM FOR MALIGNANT NEOPLASM OF BREAST: ICD-10-CM

## 2019-05-23 PROCEDURE — 88305 TISSUE EXAM BY PATHOLOGIST: CPT

## 2019-05-23 PROCEDURE — 77065 DX MAMMO INCL CAD UNI: CPT

## 2019-05-23 PROCEDURE — 19083 BX BREAST 1ST LESION US IMAG: CPT | Mod: RT

## 2019-05-23 PROCEDURE — 19084 BX BREAST ADD LESION US IMAG: CPT

## 2019-05-23 PROCEDURE — 19083 BX BREAST 1ST LESION US IMAG: CPT

## 2019-05-23 PROCEDURE — 19084 BX BREAST ADD LESION US IMAG: CPT | Mod: RT

## 2019-05-23 PROCEDURE — 88305 TISSUE EXAM BY PATHOLOGIST: CPT | Mod: 26

## 2019-05-23 PROCEDURE — A4648: CPT

## 2019-05-23 PROCEDURE — 77065 DX MAMMO INCL CAD UNI: CPT | Mod: 26,RT

## 2019-05-30 ENCOUNTER — APPOINTMENT (OUTPATIENT)
Dept: DERMATOLOGY | Facility: CLINIC | Age: 44
End: 2019-05-30
Payer: MEDICAID

## 2019-05-30 VITALS — HEART RATE: 76 BPM | DIASTOLIC BLOOD PRESSURE: 82 MMHG | SYSTOLIC BLOOD PRESSURE: 126 MMHG

## 2019-05-30 PROCEDURE — 99203 OFFICE O/P NEW LOW 30 MIN: CPT | Mod: GC

## 2019-08-29 ENCOUNTER — APPOINTMENT (OUTPATIENT)
Dept: INTERNAL MEDICINE | Facility: CLINIC | Age: 44
End: 2019-08-29
Payer: MEDICAID

## 2019-08-29 ENCOUNTER — LABORATORY RESULT (OUTPATIENT)
Age: 44
End: 2019-08-29

## 2019-08-29 VITALS
WEIGHT: 168 LBS | HEART RATE: 95 BPM | TEMPERATURE: 98 F | BODY MASS INDEX: 24.88 KG/M2 | DIASTOLIC BLOOD PRESSURE: 80 MMHG | OXYGEN SATURATION: 100 % | HEIGHT: 69 IN | SYSTOLIC BLOOD PRESSURE: 120 MMHG | RESPIRATION RATE: 16 BRPM

## 2019-08-29 PROCEDURE — 99396 PREV VISIT EST AGE 40-64: CPT

## 2019-08-29 RX ORDER — POLYETHYLENE GLYCOL 3350 17 G/17G
17 POWDER, FOR SOLUTION ORAL
Qty: 238 | Refills: 0 | Status: DISCONTINUED | COMMUNITY
Start: 2018-05-15 | End: 2019-08-29

## 2019-08-29 NOTE — HISTORY OF PRESENT ILLNESS
[de-identified] : 43 year old female patient with history of stable Asthma, Hypercholesterolemia, Vitamin D Deficiency,  Iron Deficiency Anemia, Lumbar Radiculopathy, history as stated, presented for an annual preventative examination. Patient denies any associated symptoms of shortness of breath, chest pain, abdominal pain at this time.\par \par .\par \par

## 2019-08-29 NOTE — HEALTH RISK ASSESSMENT
[Good] : ~his/her~  mood as  good [No] : In the past 12 months have you used drugs other than those required for medical reasons? No [0] : 2) Feeling down, depressed, or hopeless: Not at all (0) [Patient reported PAP Smear was normal] : Patient reported PAP Smear was normal [HIV test declined] : HIV test declined [Alone] : lives alone [Feels Safe at Home] : Feels safe at home [Fully functional (bathing, dressing, toileting, transferring, walking, feeding)] : Fully functional (bathing, dressing, toileting, transferring, walking, feeding) [Fully functional (using the telephone, shopping, preparing meals, housekeeping, doing laundry, using] : Fully functional and needs no help or supervision to perform IADLs (using the telephone, shopping, preparing meals, housekeeping, doing laundry, using transportation, managing medications and managing finances) [Smoke Detector] : smoke detector [Carbon Monoxide Detector] : carbon monoxide detector [Sunscreen] : uses sunscreen [Seat Belt] :  uses seat belt [With Patient/Caregiver] : With Patient/Caregiver [FreeTextEntry1] : Check up\par  [] : No [de-identified] : None [[id number]] : 0 [Change in mental status noted] : No change in mental status noted [Reports changes in hearing] : Reports no changes in hearing [Reports changes in vision] : Reports no changes in vision [Reports changes in dental health] : Reports no changes in dental health [PapSmearDate] : 12/17 [AdvancecareDate] : 08/19

## 2019-08-29 NOTE — ASSESSMENT
[FreeTextEntry1] : 43 year old female found to have stable Asthma, Hypercholesterolemia, Vitamin D Deficiency,  Iron Deficiency Anemia, Lumbar Radiculopathy,with the current regimen, diet and life style modifications, as counseled. Prior results reviewed and discussed with the patient during today's examination. Plan as ordered.\par

## 2019-09-02 ENCOUNTER — CLINICAL ADVICE (OUTPATIENT)
Age: 44
End: 2019-09-02

## 2019-09-02 LAB
25(OH)D3 SERPL-MCNC: 18 NG/ML
ALBUMIN SERPL ELPH-MCNC: 4.6 G/DL
ALP BLD-CCNC: 40 U/L
ALT SERPL-CCNC: 14 U/L
ANION GAP SERPL CALC-SCNC: 14 MMOL/L
APPEARANCE: ABNORMAL
AST SERPL-CCNC: 19 U/L
BASOPHILS # BLD AUTO: 0.05 K/UL
BASOPHILS NFR BLD AUTO: 1 %
BILIRUB SERPL-MCNC: 0.4 MG/DL
BILIRUBIN URINE: NEGATIVE
BLOOD URINE: NEGATIVE
BUN SERPL-MCNC: 10 MG/DL
CALCIUM SERPL-MCNC: 10.4 MG/DL
CHLORIDE SERPL-SCNC: 103 MMOL/L
CHOLEST SERPL-MCNC: 186 MG/DL
CHOLEST/HDLC SERPL: 3.3 RATIO
CO2 SERPL-SCNC: 22 MMOL/L
COLOR: NORMAL
CREAT SERPL-MCNC: 0.63 MG/DL
CREAT SPEC-SCNC: 72 MG/DL
EOSINOPHIL # BLD AUTO: 0.15 K/UL
EOSINOPHIL NFR BLD AUTO: 3 %
ERYTHROCYTE [SEDIMENTATION RATE] IN BLOOD BY WESTERGREN METHOD: 46 MM/HR
ESTIMATED AVERAGE GLUCOSE: 105 MG/DL
FOLATE SERPL-MCNC: 16.7 NG/ML
GGT SERPL-CCNC: 9 U/L
GLUCOSE QUALITATIVE U: NEGATIVE
GLUCOSE SERPL-MCNC: 91 MG/DL
HBA1C MFR BLD HPLC: 5.3 %
HCT VFR BLD CALC: 30.3 %
HDLC SERPL-MCNC: 57 MG/DL
HGB BLD-MCNC: 8.6 G/DL
IMM GRANULOCYTES NFR BLD AUTO: 0 %
IRON SATN MFR SERPL: 4 %
IRON SERPL-MCNC: 19 UG/DL
KETONES URINE: NEGATIVE
LDLC SERPL CALC-MCNC: 117 MG/DL
LEUKOCYTE ESTERASE URINE: NEGATIVE
LYMPHOCYTES # BLD AUTO: 1.6 K/UL
LYMPHOCYTES NFR BLD AUTO: 32.3 %
M TB IFN-G BLD-IMP: NEGATIVE
MAN DIFF?: NORMAL
MCHC RBC-ENTMCNC: 21 PG
MCHC RBC-ENTMCNC: 28.4 GM/DL
MCV RBC AUTO: 74.1 FL
MICROALBUMIN 24H UR DL<=1MG/L-MCNC: <1.2 MG/DL
MICROALBUMIN/CREAT 24H UR-RTO: NORMAL MG/G
MONOCYTES # BLD AUTO: 0.68 K/UL
MONOCYTES NFR BLD AUTO: 13.7 %
NEUTROPHILS # BLD AUTO: 2.48 K/UL
NEUTROPHILS NFR BLD AUTO: 50 %
NITRITE URINE: NEGATIVE
PH URINE: 6
PLATELET # BLD AUTO: 170 K/UL
POTASSIUM SERPL-SCNC: 4.1 MMOL/L
PROT SERPL-MCNC: 7.2 G/DL
PROTEIN URINE: NEGATIVE
QUANTIFERON TB PLUS MITOGEN MINUS NIL: >10 IU/ML
QUANTIFERON TB PLUS NIL: 0.02 IU/ML
QUANTIFERON TB PLUS TB1 MINUS NIL: 0.03 IU/ML
QUANTIFERON TB PLUS TB2 MINUS NIL: 0.04 IU/ML
RBC # BLD: 4.09 M/UL
RBC # FLD: 19 %
SODIUM SERPL-SCNC: 139 MMOL/L
SPECIFIC GRAVITY URINE: 1.01
T3 SERPL-MCNC: 104 NG/DL
T4 FREE SERPL-MCNC: 1.2 NG/DL
TIBC SERPL-MCNC: 453 UG/DL
TRIGL SERPL-MCNC: 61 MG/DL
TSH SERPL-ACNC: 2.34 UIU/ML
UIBC SERPL-MCNC: 434 UG/DL
UROBILINOGEN URINE: NORMAL
VIT B12 SERPL-MCNC: 471 PG/ML
WBC # FLD AUTO: 4.96 K/UL

## 2019-12-27 ENCOUNTER — MOBILE ON CALL (OUTPATIENT)
Age: 44
End: 2019-12-27

## 2020-03-19 DIAGNOSIS — J45.909 UNSPECIFIED ASTHMA, UNCOMPLICATED: ICD-10-CM

## 2020-03-19 RX ORDER — ALBUTEROL SULFATE 90 UG/1
108 (90 BASE) AEROSOL, METERED RESPIRATORY (INHALATION)
Qty: 1 | Refills: 5 | Status: ACTIVE | COMMUNITY
Start: 2020-03-19 | End: 1900-01-01

## 2020-03-19 RX ORDER — GUAIFENESIN AND DEXTROMETHORPHAN HYDROBROMIDE 100; 10 MG/5ML; MG/5ML
100-10 SOLUTION ORAL
Qty: 210 | Refills: 5 | Status: DISCONTINUED | COMMUNITY
Start: 2018-04-19 | End: 2020-03-19

## 2020-04-21 ENCOUNTER — APPOINTMENT (OUTPATIENT)
Dept: INTERNAL MEDICINE | Facility: CLINIC | Age: 45
End: 2020-04-21
Payer: MEDICAID

## 2020-04-21 PROCEDURE — 99441: CPT

## 2020-04-21 RX ORDER — MUPIROCIN 20 MG/G
2 OINTMENT TOPICAL
Qty: 22 | Refills: 0 | Status: DISCONTINUED | COMMUNITY
Start: 2019-12-01

## 2020-04-21 RX ORDER — TRIAMCINOLONE ACETONIDE 1 MG/G
0.1 CREAM TOPICAL
Qty: 15 | Refills: 0 | Status: DISCONTINUED | COMMUNITY
Start: 2019-11-12

## 2020-04-21 RX ORDER — DOXYCYCLINE HYCLATE 100 MG/1
100 CAPSULE ORAL
Qty: 14 | Refills: 0 | Status: DISCONTINUED | COMMUNITY
Start: 2020-03-19 | End: 2020-04-21

## 2020-04-21 RX ORDER — PREDNISONE 10 MG/1
10 TABLET ORAL
Qty: 21 | Refills: 0 | Status: DISCONTINUED | COMMUNITY
Start: 2020-03-19 | End: 2020-04-21

## 2020-04-21 RX ORDER — LINACLOTIDE 290 UG/1
290 CAPSULE, GELATIN COATED ORAL
Qty: 30 | Refills: 3 | Status: DISCONTINUED | COMMUNITY
Start: 2018-05-15 | End: 2020-04-21

## 2020-04-21 RX ORDER — IBUPROFEN 600 MG/1
600 TABLET, FILM COATED ORAL 3 TIMES DAILY
Qty: 30 | Refills: 0 | Status: DISCONTINUED | COMMUNITY
Start: 2017-01-26 | End: 2020-04-21

## 2020-04-21 NOTE — ASSESSMENT
[FreeTextEntry1] : 44 year F patient found to have stable Asthma, Hypercholesterolemia, Vitamin D Deficiency,  Iron Deficiency Anemia, Lumbar Radiculopathy,with the current regimen, diet and life style modifications, as counseled. Prior results reviewed and discussed with the patient during today's encounter. Plan as ordered.\par Current symptoms and evaluation are consistent with possible close exposure, suspected COVID-19, Sore Throat, prescription management and further followup as ordered, with differential diagnoses of PNA,  which may not be fully excluded at this time, as counseled.\par If current symptoms persists, to call back or have F/U office consultation or go to ER, as directed.\par

## 2020-04-21 NOTE — HISTORY OF PRESENT ILLNESS
[Verbal consent obtained from patient] : the patient, [unfilled] [de-identified] : 44 year female  patient with history of stable Asthma, Hypercholesterolemia, Vitamin D Deficiency,  Iron Deficiency Anemia, Lumbar Radiculopathy, history as stated, initiated telephonic visit with C/O Cough, fatigue, sore throat, for 4 days, S/P COVID-19 confirmed multiple people at SNF, work place, was tested today for COVID-19 at work place. No SOB at this time. Called in ill yesterday and was sent home as per WILY/CDC guidelines until resolution of her symptoms.\par

## 2020-04-21 NOTE — HEALTH RISK ASSESSMENT
[No] : In the past 12 months have you used drugs other than those required for medical reasons? No [0] : 2) Feeling down, depressed, or hopeless: Not at all (0) [] : No [de-identified] : None [[id number]] : 0

## 2020-05-09 ENCOUNTER — TRANSCRIPTION ENCOUNTER (OUTPATIENT)
Age: 45
End: 2020-05-09

## 2020-05-10 ENCOUNTER — TRANSCRIPTION ENCOUNTER (OUTPATIENT)
Age: 45
End: 2020-05-10

## 2020-05-11 ENCOUNTER — APPOINTMENT (OUTPATIENT)
Dept: INTERNAL MEDICINE | Facility: CLINIC | Age: 45
End: 2020-05-11
Payer: MEDICAID

## 2020-05-11 DIAGNOSIS — F32.9 MAJOR DEPRESSIVE DISORDER, SINGLE EPISODE, UNSPECIFIED: ICD-10-CM

## 2020-05-11 PROCEDURE — 99212 OFFICE O/P EST SF 10 MIN: CPT | Mod: 95

## 2020-05-11 NOTE — ASSESSMENT
[FreeTextEntry1] : Time spent for this encounter :  10 minutes.\par More than 50 % of the time spent for - Disease Management and Medication Adherence.\par \par 44 year F patient found to have stable Asthma, Hypercholesterolemia, Iron Deficiency Anemia, Vitamin D Deficiency, Depression, with the current regimen, diet and life style modifications, as counseled. Prior results reviewed and discussed with the patient during today's TH encounter. Plan as ordered.\par \par \par Current symptoms and evaluation are consistent with COVID-19, prescription management and further followup as ordered, with differential diagnoses of PNA,  which may not be fully excluded at this time, as counseled.\par If current symptoms persists, to call back or have F/U office consultation as directed.\par \par Unable to RTW from 4/22/2020, RTW as per symptoms as counseled.\par \par \par Patient granted verbal permission to provide Telephonic/Tele Health service in reference to today's encounter, as a substitute form of a visit, for a standard office visit encounter in the phase of an ongoing Pandemic / Covid -19, with the awareness and acceptance of a possible limited nature of such an encounter, with a possibility of an inadvertent omission of possible findings and misleading treatment options, due to possible erroneous and/or incomplete diagnostic impressions.\par

## 2020-05-11 NOTE — HISTORY OF PRESENT ILLNESS
[Home] : at home, [unfilled] , at the time of the visit. 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

## 2020-05-11 NOTE — HEALTH RISK ASSESSMENT
[Intercurrent Urgi Care visits] : went to urgent care [0] : 1) Little interest or pleasure doing things: Not at all (0) [2] : 2) Feeling down, depressed, or hopeless for more than half of the days (2) [] : No [de-identified] : 05/20 [FreeTextEntry1] : Non-suicidal at this time. Psych F/U, as counseled. [[id number]] : 2

## 2020-07-20 ENCOUNTER — LABORATORY RESULT (OUTPATIENT)
Age: 45
End: 2020-07-20

## 2020-07-20 ENCOUNTER — NON-APPOINTMENT (OUTPATIENT)
Age: 45
End: 2020-07-20

## 2020-07-20 ENCOUNTER — OUTPATIENT (OUTPATIENT)
Dept: OUTPATIENT SERVICES | Facility: HOSPITAL | Age: 45
LOS: 1 days | End: 2020-07-20
Payer: MEDICAID

## 2020-07-20 ENCOUNTER — APPOINTMENT (OUTPATIENT)
Dept: INTERNAL MEDICINE | Facility: CLINIC | Age: 45
End: 2020-07-20
Payer: MEDICAID

## 2020-07-20 VITALS
BODY MASS INDEX: 25.48 KG/M2 | HEIGHT: 69 IN | WEIGHT: 172 LBS | HEART RATE: 86 BPM | DIASTOLIC BLOOD PRESSURE: 84 MMHG | OXYGEN SATURATION: 99 % | TEMPERATURE: 98.4 F | SYSTOLIC BLOOD PRESSURE: 120 MMHG | RESPIRATION RATE: 16 BRPM

## 2020-07-20 DIAGNOSIS — J45.909 UNSPECIFIED ASTHMA, UNCOMPLICATED: ICD-10-CM

## 2020-07-20 DIAGNOSIS — U07.1 COVID-19: ICD-10-CM

## 2020-07-20 DIAGNOSIS — Z01.818 ENCOUNTER FOR OTHER PREPROCEDURAL EXAMINATION: ICD-10-CM

## 2020-07-20 PROCEDURE — 71046 X-RAY EXAM CHEST 2 VIEWS: CPT

## 2020-07-20 PROCEDURE — 71046 X-RAY EXAM CHEST 2 VIEWS: CPT | Mod: 26

## 2020-07-20 PROCEDURE — 99214 OFFICE O/P EST MOD 30 MIN: CPT | Mod: 25

## 2020-07-20 PROCEDURE — 93000 ELECTROCARDIOGRAM COMPLETE: CPT

## 2020-07-20 RX ORDER — BENZONATATE 100 MG/1
100 CAPSULE ORAL 3 TIMES DAILY
Qty: 30 | Refills: 0 | Status: DISCONTINUED | COMMUNITY
Start: 2020-04-21 | End: 2020-07-20

## 2020-07-20 NOTE — COUNSELING
[Weight management counseling provided] : Weight management [Healthy eating counseling provided] : healthy eating [Activity counseling provided] : activity [None] : None [Good understanding] : Patient has a good understanding of disease, goals and obesity follow-up plan

## 2020-07-21 LAB
ABO + RH PNL BLD: NORMAL
ALBUMIN SERPL ELPH-MCNC: 4.4 G/DL
ALP BLD-CCNC: 39 U/L
ALT SERPL-CCNC: 18 U/L
ANION GAP SERPL CALC-SCNC: 11 MMOL/L
APPEARANCE: ABNORMAL
AST SERPL-CCNC: 18 U/L
BASOPHILS # BLD AUTO: 0.07 K/UL
BASOPHILS NFR BLD AUTO: 1.1 %
BILIRUB SERPL-MCNC: 0.3 MG/DL
BILIRUBIN URINE: NEGATIVE
BLOOD URINE: NEGATIVE
BUN SERPL-MCNC: 13 MG/DL
CALCIUM SERPL-MCNC: 10.4 MG/DL
CHLORIDE SERPL-SCNC: 101 MMOL/L
CHOLEST SERPL-MCNC: 181 MG/DL
CHOLEST/HDLC SERPL: 3.2 RATIO
CO2 SERPL-SCNC: 24 MMOL/L
COLOR: NORMAL
CREAT SERPL-MCNC: 0.69 MG/DL
EOSINOPHIL # BLD AUTO: 0.12 K/UL
EOSINOPHIL NFR BLD AUTO: 1.9 %
ESTIMATED AVERAGE GLUCOSE: 103 MG/DL
FOLATE SERPL-MCNC: 17 NG/ML
GGT SERPL-CCNC: 8 U/L
GLUCOSE QUALITATIVE U: NEGATIVE
GLUCOSE SERPL-MCNC: 86 MG/DL
HBA1C MFR BLD HPLC: 5.2 %
HCT VFR BLD CALC: 29.9 %
HDLC SERPL-MCNC: 57 MG/DL
HGB BLD-MCNC: 8 G/DL
IMM GRANULOCYTES NFR BLD AUTO: 0.2 %
IRON SATN MFR SERPL: 4 %
IRON SERPL-MCNC: 19 UG/DL
KETONES URINE: NEGATIVE
LDLC SERPL CALC-MCNC: 106 MG/DL
LEUKOCYTE ESTERASE URINE: NEGATIVE
LYMPHOCYTES # BLD AUTO: 1.57 K/UL
LYMPHOCYTES NFR BLD AUTO: 25 %
MAN DIFF?: NORMAL
MCHC RBC-ENTMCNC: 20.3 PG
MCHC RBC-ENTMCNC: 26.8 GM/DL
MCV RBC AUTO: 75.7 FL
MONOCYTES # BLD AUTO: 0.66 K/UL
MONOCYTES NFR BLD AUTO: 10.5 %
NEUTROPHILS # BLD AUTO: 3.85 K/UL
NEUTROPHILS NFR BLD AUTO: 61.3 %
NITRITE URINE: NEGATIVE
PH URINE: 6
PLATELET # BLD AUTO: 215 K/UL
POTASSIUM SERPL-SCNC: 4.2 MMOL/L
PROT SERPL-MCNC: 7 G/DL
PROTEIN URINE: NEGATIVE
RBC # BLD: 3.95 M/UL
RBC # FLD: 20.8 %
SARS-COV-2 IGG SERPL IA-ACNC: 0.68 INDEX
SARS-COV-2 IGG SERPL QL IA: NEGATIVE
SODIUM SERPL-SCNC: 136 MMOL/L
SPECIFIC GRAVITY URINE: 1.02
TIBC SERPL-MCNC: 459 UG/DL
TRIGL SERPL-MCNC: 85 MG/DL
UIBC SERPL-MCNC: 439 UG/DL
UROBILINOGEN URINE: NORMAL
VIT B12 SERPL-MCNC: 465 PG/ML
WBC # FLD AUTO: 6.28 K/UL

## 2020-07-21 NOTE — ADDENDUM
[FreeTextEntry1] : PST results from 7/20/2020 noted, discussed with the patient.\par Non-compliant with Fe supplement, Chronic Anemia - Hgb 8.0, Fe supplementation as insisted and counseled.\par COVID-19 - PCR results pending.\par Patient is medically optimized to the best at this time for the stated intervention.\par Patient is medically cleared.\par \par \par

## 2020-07-21 NOTE — HISTORY OF PRESENT ILLNESS
[No Pertinent Cardiac History] : no history of aortic stenosis, atrial fibrillation, coronary artery disease, recent myocardial infarction, or implantable device/pacemaker [Asthma] : asthma [No Adverse Anesthesia Reaction] : no adverse anesthesia reaction in self or family member [(Patient denies any chest pain, claudication, dyspnea on exertion, orthopnea, palpitations or syncope)] : Patient denies any chest pain, claudication, dyspnea on exertion, orthopnea, palpitations or syncope [COPD] : no COPD [Smoker] : not a smoker [Sleep Apnea] : no sleep apnea [Chronic Anticoagulation] : no chronic anticoagulation [Chronic Kidney Disease] : no chronic kidney disease [Diabetes] : no diabetes [FreeTextEntry1] : Right ankle Sx, as per surgeon [FreeTextEntry2] : 7/23/2020 [FreeTextEntry3] : unknown [FreeTextEntry4] : 44 year old female with history of stable Asthma, Hypercholesterolemia, Vitamin D Deficiency,  Iron Deficiency Anemia, Lumbar Radiculopathy, history as stated, presented for clearance evaluation prior to possible Right ankle Sx, as per surgeon, for Right Ankle/Fibula Fx.\par

## 2020-07-21 NOTE — ASSESSMENT
[Optimized for Surgery Pending Laboratory Results] : the patient is optimized for surgery pending laboratory results [Procedure Intermediate Risk] : the procedure risk is intermediate [Patient Intermediate Risk] : the patient is an intermediate risk [As per surgery] : as per surgery [Continue] : Continue medications as currently directed [FreeTextEntry4] : 44 year old female found to have stable Asthma, Hypercholesterolemia, Vitamin D Deficiency,  Iron Deficiency Anemia, Lumbar Radiculopathy,with the current regimen, diet and life style modifications, as counseled. Prior results reviewed and discussed with the patient during today's examination. Plan as ordered.\par Possible Right ankle Sx, as per surgeon, for Right Ankle/Fibula Fx.\par EKG showed NSR at the rate of 69 BPM, non-sp ST-T changes noted.\par

## 2020-07-22 ENCOUNTER — TRANSCRIPTION ENCOUNTER (OUTPATIENT)
Age: 45
End: 2020-07-22

## 2020-07-22 RX ORDER — SODIUM CHLORIDE 9 MG/ML
3 INJECTION INTRAMUSCULAR; INTRAVENOUS; SUBCUTANEOUS EVERY 8 HOURS
Refills: 0 | Status: DISCONTINUED | OUTPATIENT
Start: 2020-07-23 | End: 2020-07-23

## 2020-07-23 ENCOUNTER — OUTPATIENT (OUTPATIENT)
Dept: OUTPATIENT SERVICES | Facility: HOSPITAL | Age: 45
LOS: 1 days | End: 2020-07-23
Payer: MEDICAID

## 2020-07-23 VITALS
RESPIRATION RATE: 16 BRPM | TEMPERATURE: 98 F | HEART RATE: 84 BPM | SYSTOLIC BLOOD PRESSURE: 106 MMHG | OXYGEN SATURATION: 96 % | DIASTOLIC BLOOD PRESSURE: 58 MMHG

## 2020-07-23 VITALS
HEART RATE: 96 BPM | TEMPERATURE: 98 F | RESPIRATION RATE: 16 BRPM | DIASTOLIC BLOOD PRESSURE: 87 MMHG | WEIGHT: 171.96 LBS | OXYGEN SATURATION: 100 % | HEIGHT: 69 IN | SYSTOLIC BLOOD PRESSURE: 137 MMHG

## 2020-07-23 DIAGNOSIS — S82.401A UNSPECIFIED FRACTURE OF SHAFT OF RIGHT FIBULA, INITIAL ENCOUNTER FOR CLOSED FRACTURE: ICD-10-CM

## 2020-07-23 DIAGNOSIS — E55.9 VITAMIN D DEFICIENCY, UNSPECIFIED: ICD-10-CM

## 2020-07-23 DIAGNOSIS — D64.9 ANEMIA, UNSPECIFIED: ICD-10-CM

## 2020-07-23 DIAGNOSIS — J45.909 UNSPECIFIED ASTHMA, UNCOMPLICATED: ICD-10-CM

## 2020-07-23 DIAGNOSIS — S82.409A UNSPECIFIED FRACTURE OF SHAFT OF UNSPECIFIED FIBULA, INITIAL ENCOUNTER FOR CLOSED FRACTURE: ICD-10-CM

## 2020-07-23 LAB
BLD GP AB SCN SERPL QL: SIGNIFICANT CHANGE UP
HCG SERPL-ACNC: <1 MIU/ML — SIGNIFICANT CHANGE UP

## 2020-07-23 PROCEDURE — 86901 BLOOD TYPING SEROLOGIC RH(D): CPT

## 2020-07-23 PROCEDURE — 36415 COLL VENOUS BLD VENIPUNCTURE: CPT

## 2020-07-23 PROCEDURE — 86900 BLOOD TYPING SEROLOGIC ABO: CPT

## 2020-07-23 PROCEDURE — 97161 PT EVAL LOW COMPLEX 20 MIN: CPT

## 2020-07-23 PROCEDURE — 84702 CHORIONIC GONADOTROPIN TEST: CPT

## 2020-07-23 PROCEDURE — 86850 RBC ANTIBODY SCREEN: CPT

## 2020-07-23 PROCEDURE — C1713: CPT

## 2020-07-23 PROCEDURE — C1889: CPT

## 2020-07-23 PROCEDURE — 27792 TREATMENT OF ANKLE FRACTURE: CPT | Mod: RT

## 2020-07-23 RX ORDER — SODIUM CHLORIDE 9 MG/ML
1000 INJECTION, SOLUTION INTRAVENOUS
Refills: 0 | Status: DISCONTINUED | OUTPATIENT
Start: 2020-07-23 | End: 2020-07-24

## 2020-07-23 RX ORDER — ACETAMINOPHEN 500 MG
975 TABLET ORAL ONCE
Refills: 0 | Status: COMPLETED | OUTPATIENT
Start: 2020-07-23 | End: 2020-07-23

## 2020-07-23 RX ORDER — HYDROMORPHONE HYDROCHLORIDE 2 MG/ML
1 INJECTION INTRAMUSCULAR; INTRAVENOUS; SUBCUTANEOUS
Refills: 0 | Status: DISCONTINUED | OUTPATIENT
Start: 2020-07-23 | End: 2020-07-24

## 2020-07-23 RX ORDER — CELECOXIB 200 MG/1
200 CAPSULE ORAL ONCE
Refills: 0 | Status: COMPLETED | OUTPATIENT
Start: 2020-07-23 | End: 2020-07-23

## 2020-07-23 RX ORDER — OXYCODONE AND ACETAMINOPHEN 5; 325 MG/1; MG/1
1 TABLET ORAL ONCE
Refills: 0 | Status: DISCONTINUED | OUTPATIENT
Start: 2020-07-23 | End: 2020-07-23

## 2020-07-23 RX ORDER — HYDROMORPHONE HYDROCHLORIDE 2 MG/ML
0.5 INJECTION INTRAMUSCULAR; INTRAVENOUS; SUBCUTANEOUS
Refills: 0 | Status: DISCONTINUED | OUTPATIENT
Start: 2020-07-23 | End: 2020-07-24

## 2020-07-23 RX ADMIN — CELECOXIB 200 MILLIGRAM(S): 200 CAPSULE ORAL at 13:30

## 2020-07-23 RX ADMIN — HYDROMORPHONE HYDROCHLORIDE 0.5 MILLIGRAM(S): 2 INJECTION INTRAMUSCULAR; INTRAVENOUS; SUBCUTANEOUS at 18:10

## 2020-07-23 RX ADMIN — HYDROMORPHONE HYDROCHLORIDE 0.5 MILLIGRAM(S): 2 INJECTION INTRAMUSCULAR; INTRAVENOUS; SUBCUTANEOUS at 17:55

## 2020-07-23 RX ADMIN — Medication 975 MILLIGRAM(S): at 13:30

## 2020-07-23 RX ADMIN — SODIUM CHLORIDE 3 MILLILITER(S): 9 INJECTION INTRAMUSCULAR; INTRAVENOUS; SUBCUTANEOUS at 13:29

## 2020-07-23 NOTE — H&P PST ADULT - DOES PATIENT HAVE ADVANCE DIRECTIVE
No/Pt's mother Denise Cowan will make decisions in case of emergency No/Pt's mother Denise Cowan [phone] will make decisions in case of emergency

## 2020-07-23 NOTE — H&P PST ADULT - HISTORY OF PRESENT ILLNESS
44 yr old female with PMH of asthma, anemia, vitamin D deficiency, COVID-19 infection in April presents with c/o right leg pain due to fibula fracture sustained after falling down stairs last week. Had splint applied for comfort. Pt is scheduled for open reduction internal fixation of right fibula fracture on 07/23/2020.

## 2020-07-23 NOTE — ASU PREOP CHECKLIST - HEIGHT IN CM
LYING IN BED ON VENT AT THIS TIME. NO ACUTE DISTRESS NOTED. VSS. PT RESTING
CALMLY WITH EYES CLOSED, OPENS EYES WHEN SPOKEN TO. FENTANYL PCA CURRENTLY OFF
SINCE PT IS RESTING WITH EYES CLOSED AND IS CALM. WILL CONTINUE PLAN OF CARE. 175.26

## 2020-07-23 NOTE — H&P PST ADULT - ACTIVITY
tolerates stair climbing without any exertional symptoms limited due to right leg pain; tolerates stair climbing without any exertional symptoms

## 2020-07-23 NOTE — ASU PATIENT PROFILE, ADULT - NS PRO LACT YNNA
no 60 yo F with hx of HTN, HLD, pre DM co feeling generalized weakness with nausea, dizziness and epigastric pain/chest tightness sp getting into argument with pt at work this am, given zofran in ambulance with improvement, symptoms resolved in ED, abdomen soft and NT, no neuro deficits on exam, will get ekg, labs, IVF, pepcid, re-assess

## 2020-07-23 NOTE — H&P PST ADULT - ASSESSMENT
44 yr old female with PMH of asthma,  anemia, vitamin D deficiency, COVID-19 infection in April presents with right fibula fracture. Pt is scheduled for open reduction internal fixation of right fibula fracture on 07/23/2020.

## 2020-07-23 NOTE — H&P PST ADULT - RESPIRATORY AND THORAX COMMENTS
ASTHMA-HOS asthma-last attack in April 2020-hospitalized x 2-Last one in 3/2017 with pneumonia and flu; COVID-19 infection in April 2020

## 2020-07-23 NOTE — ASU DISCHARGE PLAN (ADULT/PEDIATRIC) - CALL YOUR DOCTOR IF YOU HAVE ANY OF THE FOLLOWING:
Wound/Surgical Site with redness, or foul smelling discharge or pus/Bleeding that does not stop/Pain not relieved by Medications/Swelling that gets worse

## 2020-07-23 NOTE — H&P PST ADULT - NEGATIVE GASTROINTESTINAL SYMPTOMS
no constipation/no change in bowel habits/no nausea/no flatulence/no diarrhea/no hematochezia/no vomiting/no abdominal pain

## 2020-07-23 NOTE — H&P PST ADULT - NSICDXPROBLEM_GEN_ALL_CORE_FT
PROBLEM DIAGNOSES  Problem: Fibula fracture  Assessment and Plan: Open fracture internal fixation of right fibular fracture on July 23, 2020. Follow-up with surgeon for management.    Problem: Asthma  Assessment and Plan: Continue Albuterol and follow-up with PCP for asthma management. Perioperative pulmonary managemnt.    Problem: Anemia  Assessment and Plan: Continue Ferrous sulfate and follow-up with mangement.    Problem: Vitamin D deficiency  Assessment and Plan: Continue Vitamin D and follow-up with PCP management.

## 2020-07-23 NOTE — H&P PST ADULT - NEGATIVE NEUROLOGICAL SYMPTOMS
no syncope/no focal seizures/no generalized seizures/no vertigo/no headache/no tremors/no loss of sensation

## 2020-07-23 NOTE — H&P PST ADULT - NSICDXPASTMEDICALHX_GEN_ALL_CORE_FT
PAST MEDICAL HISTORY:  Anemia     Asthma     Fracture of right fibula     IBS (irritable bowel syndrome)     Uterine leiomyoma     Vitamin D deficiency

## 2020-07-23 NOTE — PHYSICAL THERAPY INITIAL EVALUATION ADULT - ADDITIONAL COMMENTS
Pt. reports owning DME of shower chair, bathroom grab bars, raised toilet seat, crutches and knee scooter

## 2020-07-23 NOTE — H&P PST ADULT - GASTROINTESTINAL DETAILS
no bruit/no rebound tenderness/no rigidity/no organomegaly/normal/nontender/no masses palpable/soft/no distention/no guarding/bowel sounds normal

## 2020-07-23 NOTE — H&P PST ADULT - RS GEN PE MLT RESP DETAILS PC
airway patent/breath sounds equal/normal/no rhonchi/no chest wall tenderness/clear to auscultation bilaterally/no subcutaneous emphysema/no rales/no wheezes/respirations non-labored/good air movement/no intercostal retractions

## 2020-07-29 ENCOUNTER — TRANSCRIPTION ENCOUNTER (OUTPATIENT)
Age: 45
End: 2020-07-29

## 2020-07-30 ENCOUNTER — TRANSCRIPTION ENCOUNTER (OUTPATIENT)
Age: 45
End: 2020-07-30

## 2020-08-03 ENCOUNTER — TRANSCRIPTION ENCOUNTER (OUTPATIENT)
Age: 45
End: 2020-08-03

## 2020-09-16 ENCOUNTER — TRANSCRIPTION ENCOUNTER (OUTPATIENT)
Age: 45
End: 2020-09-16

## 2020-09-17 LAB
ALBUMIN SERPL ELPH-MCNC: 4.4 G/DL
ALP BLD-CCNC: 42 U/L
ALT SERPL-CCNC: 10 U/L
ANION GAP SERPL CALC-SCNC: 13 MMOL/L
AST SERPL-CCNC: 16 U/L
BASOPHILS # BLD AUTO: 0.06 K/UL
BASOPHILS NFR BLD AUTO: 1.4 %
BILIRUB SERPL-MCNC: 0.4 MG/DL
BUN SERPL-MCNC: 9 MG/DL
CALCIUM SERPL-MCNC: 10.3 MG/DL
CHLORIDE SERPL-SCNC: 103 MMOL/L
CO2 SERPL-SCNC: 21 MMOL/L
CREAT SERPL-MCNC: 0.63 MG/DL
EOSINOPHIL # BLD AUTO: 0.16 K/UL
EOSINOPHIL NFR BLD AUTO: 3.8 %
FOLATE SERPL-MCNC: 17.2 NG/ML
GGT SERPL-CCNC: 10 U/L
GLUCOSE SERPL-MCNC: 88 MG/DL
HCT VFR BLD CALC: 29.6 %
HGB BLD-MCNC: 8.2 G/DL
IMM GRANULOCYTES NFR BLD AUTO: 0 %
IRON SATN MFR SERPL: 5 %
IRON SERPL-MCNC: 19 UG/DL
LYMPHOCYTES # BLD AUTO: 1.36 K/UL
LYMPHOCYTES NFR BLD AUTO: 32.1 %
MAN DIFF?: NORMAL
MCHC RBC-ENTMCNC: 19.9 PG
MCHC RBC-ENTMCNC: 27.7 GM/DL
MCV RBC AUTO: 71.8 FL
MONOCYTES # BLD AUTO: 0.46 K/UL
MONOCYTES NFR BLD AUTO: 10.8 %
NEUTROPHILS # BLD AUTO: 2.2 K/UL
NEUTROPHILS NFR BLD AUTO: 51.9 %
PLATELET # BLD AUTO: 203 K/UL
POTASSIUM SERPL-SCNC: 3.8 MMOL/L
PROT SERPL-MCNC: 7 G/DL
RBC # BLD: 4.12 M/UL
RBC # BLD: 4.12 M/UL
RBC # FLD: 20.1 %
RETICS # AUTO: 1.3 %
RETICS AGGREG/RBC NFR: 51.9 K/UL
SODIUM SERPL-SCNC: 137 MMOL/L
TIBC SERPL-MCNC: 415 UG/DL
UIBC SERPL-MCNC: 396 UG/DL
VIT B12 SERPL-MCNC: 499 PG/ML
WBC # FLD AUTO: 4.24 K/UL

## 2020-09-21 PROBLEM — S82.401A UNSPECIFIED FRACTURE OF SHAFT OF RIGHT FIBULA, INITIAL ENCOUNTER FOR CLOSED FRACTURE: Chronic | Status: ACTIVE | Noted: 2020-07-23

## 2020-09-21 PROBLEM — D64.9 ANEMIA, UNSPECIFIED: Chronic | Status: ACTIVE | Noted: 2020-07-23

## 2020-09-21 PROBLEM — D25.9 LEIOMYOMA OF UTERUS, UNSPECIFIED: Chronic | Status: ACTIVE | Noted: 2020-07-23

## 2020-09-21 PROBLEM — E55.9 VITAMIN D DEFICIENCY, UNSPECIFIED: Chronic | Status: ACTIVE | Noted: 2020-07-23

## 2020-09-22 ENCOUNTER — OUTPATIENT (OUTPATIENT)
Dept: OUTPATIENT SERVICES | Facility: HOSPITAL | Age: 45
LOS: 1 days | End: 2020-09-22
Payer: MEDICAID

## 2020-09-22 ENCOUNTER — APPOINTMENT (OUTPATIENT)
Dept: OBGYN | Facility: CLINIC | Age: 45
End: 2020-09-22
Payer: MEDICAID

## 2020-09-22 VITALS
HEART RATE: 90 BPM | TEMPERATURE: 99 F | SYSTOLIC BLOOD PRESSURE: 117 MMHG | DIASTOLIC BLOOD PRESSURE: 72 MMHG | OXYGEN SATURATION: 100 % | HEIGHT: 69 IN

## 2020-09-22 DIAGNOSIS — U07.1 COVID-19: ICD-10-CM

## 2020-09-22 DIAGNOSIS — Z00.00 ENCOUNTER FOR GENERAL ADULT MEDICAL EXAMINATION WITHOUT ABNORMAL FINDINGS: ICD-10-CM

## 2020-09-22 PROCEDURE — 99213 OFFICE O/P EST LOW 20 MIN: CPT

## 2020-09-22 PROCEDURE — G0463: CPT

## 2020-09-22 NOTE — HISTORY OF PRESENT ILLNESS
[TextBox_4] : Patient has hx/o symptomatic fibroid uterus, counseled previously by Dr. Mcconnell on treatment options, last visit with Dr. Mcconnell(05/07/2019), after counseling provided, patient opted for holistic management for her symptoms.  \par \par Patient presents today, after symptomatic anemia causing dizziness, and fainting spell that resulted in fracturing her Right leg, currently in a cast.  Patient is still considering her fertility options.  Patient was counseled today based on her symptoms of symptomatic fibroid uterus and age, she will either have to actively seek [facility]EE[facility] for evaluation and management of conceiving or opt for treatment of her fibroids that would not allow her to conceive. Patient was counseled on management from non-invasive to the most invasive options including Uterine arter embolization, Myomectomy or Hysterectomy respectively.  Patient is adamantly against surgical management, willing to consult IR for [place] consultation.  Patient was counseled she needs an endometrial biopsy due to her hx/o menorrhagia and if she were to consider [place] prior to procedure.  Patient expresses understanding and will RTO for Embx. \par \par Last pelvic ultrasound (04/2019) ~ 12 weeks size fibroid uterus - referral for repeat pelvic ultrasound given today.

## 2020-09-23 DIAGNOSIS — D25.9 LEIOMYOMA OF UTERUS, UNSPECIFIED: ICD-10-CM

## 2020-09-23 DIAGNOSIS — Z87.42 PERSONAL HISTORY OF OTHER DISEASES OF THE FEMALE GENITAL TRACT: ICD-10-CM

## 2020-09-23 DIAGNOSIS — D50.9 IRON DEFICIENCY ANEMIA, UNSPECIFIED: ICD-10-CM

## 2020-10-05 ENCOUNTER — APPOINTMENT (OUTPATIENT)
Dept: OBGYN | Facility: CLINIC | Age: 45
End: 2020-10-05

## 2020-10-13 ENCOUNTER — TRANSCRIPTION ENCOUNTER (OUTPATIENT)
Age: 45
End: 2020-10-13

## 2020-12-28 ENCOUNTER — TRANSCRIPTION ENCOUNTER (OUTPATIENT)
Age: 45
End: 2020-12-28

## 2020-12-29 ENCOUNTER — EMERGENCY (EMERGENCY)
Facility: HOSPITAL | Age: 45
LOS: 1 days | Discharge: HOME | End: 2020-12-29
Attending: EMERGENCY MEDICINE
Payer: MEDICAID

## 2020-12-29 ENCOUNTER — NON-APPOINTMENT (OUTPATIENT)
Age: 45
End: 2020-12-29

## 2020-12-29 VITALS
DIASTOLIC BLOOD PRESSURE: 85 MMHG | HEART RATE: 89 BPM | RESPIRATION RATE: 17 BRPM | SYSTOLIC BLOOD PRESSURE: 143 MMHG | TEMPERATURE: 99 F | OXYGEN SATURATION: 100 % | HEIGHT: 69 IN

## 2020-12-29 VITALS
DIASTOLIC BLOOD PRESSURE: 68 MMHG | SYSTOLIC BLOOD PRESSURE: 116 MMHG | RESPIRATION RATE: 16 BRPM | HEART RATE: 71 BPM | TEMPERATURE: 98 F | OXYGEN SATURATION: 100 %

## 2020-12-29 LAB
ALBUMIN SERPL ELPH-MCNC: 3.6 G/DL — SIGNIFICANT CHANGE UP (ref 3.5–5)
ALP SERPL-CCNC: 44 U/L — SIGNIFICANT CHANGE UP (ref 40–120)
ALT FLD-CCNC: 19 U/L DA — SIGNIFICANT CHANGE UP (ref 10–60)
ANION GAP SERPL CALC-SCNC: 10 MMOL/L — SIGNIFICANT CHANGE UP (ref 5–17)
APTT BLD: 25.5 SEC — LOW (ref 27.5–35.5)
AST SERPL-CCNC: 10 U/L — SIGNIFICANT CHANGE UP (ref 10–40)
BASOPHILS # BLD AUTO: 0.08 K/UL — SIGNIFICANT CHANGE UP (ref 0–0.2)
BASOPHILS NFR BLD AUTO: 2 % — SIGNIFICANT CHANGE UP (ref 0–2)
BILIRUB SERPL-MCNC: 0.3 MG/DL — SIGNIFICANT CHANGE UP (ref 0.2–1.2)
BUN SERPL-MCNC: 9 MG/DL — SIGNIFICANT CHANGE UP (ref 7–18)
CALCIUM SERPL-MCNC: 9.8 MG/DL — SIGNIFICANT CHANGE UP (ref 8.4–10.5)
CHLORIDE SERPL-SCNC: 106 MMOL/L — SIGNIFICANT CHANGE UP (ref 96–108)
CO2 SERPL-SCNC: 24 MMOL/L — SIGNIFICANT CHANGE UP (ref 22–31)
CREAT SERPL-MCNC: 0.55 MG/DL — SIGNIFICANT CHANGE UP (ref 0.5–1.3)
EOSINOPHIL # BLD AUTO: 0.17 K/UL — SIGNIFICANT CHANGE UP (ref 0–0.5)
EOSINOPHIL NFR BLD AUTO: 4 % — SIGNIFICANT CHANGE UP (ref 0–6)
GLUCOSE SERPL-MCNC: 90 MG/DL — SIGNIFICANT CHANGE UP (ref 70–99)
HCG SERPL-ACNC: <1 MIU/ML — SIGNIFICANT CHANGE UP
HCT VFR BLD CALC: 28.9 % — LOW (ref 34.5–45)
HGB BLD-MCNC: 8 G/DL — LOW (ref 11.5–15.5)
INR BLD: 1.1 RATIO — SIGNIFICANT CHANGE UP (ref 0.88–1.16)
LYMPHOCYTES # BLD AUTO: 1.23 K/UL — SIGNIFICANT CHANGE UP (ref 1–3.3)
LYMPHOCYTES # BLD AUTO: 29 % — SIGNIFICANT CHANGE UP (ref 13–44)
MCHC RBC-ENTMCNC: 19.2 PG — LOW (ref 27–34)
MCHC RBC-ENTMCNC: 27.7 GM/DL — LOW (ref 32–36)
MCV RBC AUTO: 69.5 FL — LOW (ref 80–100)
MONOCYTES # BLD AUTO: 0.55 K/UL — SIGNIFICANT CHANGE UP (ref 0–0.9)
MONOCYTES NFR BLD AUTO: 13 % — SIGNIFICANT CHANGE UP (ref 2–14)
NEUTROPHILS # BLD AUTO: 2.16 K/UL — SIGNIFICANT CHANGE UP (ref 1.8–7.4)
NEUTROPHILS NFR BLD AUTO: 51 % — SIGNIFICANT CHANGE UP (ref 43–77)
PLATELET # BLD AUTO: 179 K/UL — SIGNIFICANT CHANGE UP (ref 150–400)
POTASSIUM SERPL-MCNC: 3.6 MMOL/L — SIGNIFICANT CHANGE UP (ref 3.5–5.3)
POTASSIUM SERPL-SCNC: 3.6 MMOL/L — SIGNIFICANT CHANGE UP (ref 3.5–5.3)
PROT SERPL-MCNC: 7.4 G/DL — SIGNIFICANT CHANGE UP (ref 6–8.3)
PROTHROM AB SERPL-ACNC: 13 SEC — SIGNIFICANT CHANGE UP (ref 10.6–13.6)
RBC # BLD: 4.16 M/UL — SIGNIFICANT CHANGE UP (ref 3.8–5.2)
RBC # FLD: 20.5 % — HIGH (ref 10.3–14.5)
SARS-COV-2 RNA SPEC QL NAA+PROBE: SIGNIFICANT CHANGE UP
SODIUM SERPL-SCNC: 140 MMOL/L — SIGNIFICANT CHANGE UP (ref 135–145)
TROPONIN I SERPL-MCNC: <0.015 NG/ML — SIGNIFICANT CHANGE UP (ref 0–0.04)
WBC # BLD: 4.23 K/UL — SIGNIFICANT CHANGE UP (ref 3.8–10.5)
WBC # FLD AUTO: 4.23 K/UL — SIGNIFICANT CHANGE UP (ref 3.8–10.5)

## 2020-12-29 PROCEDURE — 86901 BLOOD TYPING SEROLOGIC RH(D): CPT

## 2020-12-29 PROCEDURE — 80053 COMPREHEN METABOLIC PANEL: CPT

## 2020-12-29 PROCEDURE — 99284 EMERGENCY DEPT VISIT MOD MDM: CPT | Mod: 25

## 2020-12-29 PROCEDURE — 99285 EMERGENCY DEPT VISIT HI MDM: CPT

## 2020-12-29 PROCEDURE — 71045 X-RAY EXAM CHEST 1 VIEW: CPT | Mod: 26

## 2020-12-29 PROCEDURE — 84484 ASSAY OF TROPONIN QUANT: CPT

## 2020-12-29 PROCEDURE — 86900 BLOOD TYPING SEROLOGIC ABO: CPT

## 2020-12-29 PROCEDURE — 70498 CT ANGIOGRAPHY NECK: CPT | Mod: 26

## 2020-12-29 PROCEDURE — 70498 CT ANGIOGRAPHY NECK: CPT

## 2020-12-29 PROCEDURE — 70496 CT ANGIOGRAPHY HEAD: CPT | Mod: 26

## 2020-12-29 PROCEDURE — 85610 PROTHROMBIN TIME: CPT

## 2020-12-29 PROCEDURE — 84702 CHORIONIC GONADOTROPIN TEST: CPT

## 2020-12-29 PROCEDURE — 36415 COLL VENOUS BLD VENIPUNCTURE: CPT

## 2020-12-29 PROCEDURE — 85025 COMPLETE CBC W/AUTO DIFF WBC: CPT

## 2020-12-29 PROCEDURE — 70496 CT ANGIOGRAPHY HEAD: CPT

## 2020-12-29 PROCEDURE — 93005 ELECTROCARDIOGRAM TRACING: CPT

## 2020-12-29 PROCEDURE — 85730 THROMBOPLASTIN TIME PARTIAL: CPT

## 2020-12-29 PROCEDURE — 96374 THER/PROPH/DIAG INJ IV PUSH: CPT | Mod: XU

## 2020-12-29 PROCEDURE — 71045 X-RAY EXAM CHEST 1 VIEW: CPT

## 2020-12-29 PROCEDURE — 86850 RBC ANTIBODY SCREEN: CPT

## 2020-12-29 PROCEDURE — 87635 SARS-COV-2 COVID-19 AMP PRB: CPT

## 2020-12-29 RX ORDER — METOCLOPRAMIDE HCL 10 MG
10 TABLET ORAL ONCE
Refills: 0 | Status: COMPLETED | OUTPATIENT
Start: 2020-12-29 | End: 2020-12-29

## 2020-12-29 RX ADMIN — Medication 10 MILLIGRAM(S): at 14:20

## 2020-12-29 NOTE — ED ADULT NURSE NOTE - NSIMPLEMENTINTERV_GEN_ALL_ED
Implemented All Universal Safety Interventions:  [place] to call system. Call bell, personal items and telephone within reach. Instruct patient to call for assistance. Room bathroom lighting operational. Non-slip footwear when patient is off stretcher. Physically safe environment: no spills, clutter or unnecessary equipment. Stretcher in lowest position, wheels locked, appropriate side rails in place.

## 2020-12-29 NOTE — ED ADULT NURSE NOTE - NURSING ED SKIN COLOR
Minocycline Counseling: Patient advised regarding possible photosensitivity and discoloration of the teeth, skin, lips, tongue and gums.  Patient instructed to avoid sunlight, if possible.  When exposed to sunlight, patients should wear protective clothing, sunglasses, and sunscreen.  The patient was instructed to call the office immediately if the following severe adverse effects occur:  hearing changes, easy bruising/bleeding, severe headache, or vision changes.  The patient verbalized understanding of the proper use and possible adverse effects of minocycline.  All of the patient's questions and concerns were addressed. Bactrim Counseling:  I discussed with the patient the risks of sulfa antibiotics including but not limited to GI upset, allergic reaction, drug rash, diarrhea, dizziness, photosensitivity, and yeast infections.  Rarely, more serious reactions can occur including but not limited to aplastic anemia, agranulocytosis, methemoglobinemia, blood dyscrasias, liver or kidney failure, lung infiltrates or desquamative/blistering drug rashes. Tazorac Pregnancy And Lactation Text: This medication is not safe during pregnancy. It is unknown if this medication is excreted in breast milk. Isotretinoin Counseling: Patient should get monthly blood tests, not donate blood, not drive at night if vision affected, not share medication, and not undergo elective surgery for 6 months after tx completed. Side effects reviewed, pt to contact office should one occur. High Dose Vitamin A Counseling: Side effects reviewed, pt to contact office should one occur. Dapsone Counseling: I discussed with the patient the risks of dapsone including but not limited to hemolytic anemia, agranulocytosis, rashes, methemoglobinemia, kidney failure, peripheral neuropathy, headaches, GI upset, and liver toxicity.  Patients who start dapsone require monitoring including baseline LFTs and weekly CBCs for the first month, then every month thereafter.  The patient verbalized understanding of the proper use and possible adverse effects of dapsone.  All of the patient's questions and concerns were addressed. Tetracycline Counseling: Patient counseled regarding possible photosensitivity and increased risk for sunburn.  Patient instructed to avoid sunlight, if possible.  When exposed to sunlight, patients should wear protective clothing, sunglasses, and sunscreen.  The patient was instructed to call the office immediately if the following severe adverse effects occur:  hearing changes, easy bruising/bleeding, severe headache, or vision changes.  The patient verbalized understanding of the proper use and possible adverse effects of tetracycline.  All of the patient's questions and concerns were addressed. Patient understands to avoid pregnancy while on therapy due to potential birth defects. Birth Control Pills Counseling: Birth Control Pill Counseling: I discussed with the patient the potential side effects of OCPs including but not limited to increased risk of stroke, heart attack, thrombophlebitis, deep venous thrombosis, hepatic adenomas, breast changes, GI upset, headaches, and depression.  The patient verbalized understanding of the proper use and possible adverse effects of OCPs. All of the patient's questions and concerns were addressed. Spironolactone Pregnancy And Lactation Text: This medication can cause feminization of the male fetus and should be avoided during pregnancy. The active metabolite is also found in breast milk. Topical Retinoid counseling:  Patient advised to apply a pea-sized amount only at bedtime and wait 30 minutes after washing their face before applying.  If too drying, patient may add a non-comedogenic moisturizer. The patient verbalized understanding of the proper use and possible adverse effects of retinoids.  All of the patient's questions and concerns were addressed. normal for race Doxycycline Counseling:  Patient counseled regarding possible photosensitivity and increased risk for sunburn.  Patient instructed to avoid sunlight, if possible.  When exposed to sunlight, patients should wear protective clothing, sunglasses, and sunscreen.  The patient was instructed to call the office immediately if the following severe adverse effects occur:  hearing changes, easy bruising/bleeding, severe headache, or vision changes.  The patient verbalized understanding of the proper use and possible adverse effects of doxycycline.  All of the patient's questions and concerns were addressed. Dapsone Pregnancy And Lactation Text: This medication is Pregnancy Category C and is not considered safe during pregnancy or breast feeding. Benzoyl Peroxide Counseling: Patient counseled that medicine may cause skin irritation and bleach clothing.  In the event of skin irritation, the patient was advised to reduce the amount of the drug applied or use it less frequently.   The patient verbalized understanding of the proper use and possible adverse effects of benzoyl peroxide.  All of the patient's questions and concerns were addressed. Topical Clindamycin Pregnancy And Lactation Text: This medication is Pregnancy Category B and is considered safe during pregnancy. It is unknown if it is excreted in breast milk. Erythromycin Pregnancy And Lactation Text: This medication is Pregnancy Category B and is considered safe during pregnancy. It is also excreted in breast milk. Sarecycline Pregnancy And Lactation Text: This medication is Pregnancy Category D and not consider safe during pregnancy. It is also excreted in breast milk. Bactrim Pregnancy And Lactation Text: This medication is Pregnancy Category D and is known to cause fetal risk.  It is also excreted in breast milk. Tazorac Counseling:  Patient advised that medication is irritating and drying.  Patient may need to apply sparingly and wash off after an hour before eventually leaving it on overnight.  The patient verbalized understanding of the proper use and possible adverse effects of tazorac.  All of the patient's questions and concerns were addressed. Topical Clindamycin Counseling: Patient counseled that this medication may cause skin irritation or allergic reactions.  In the event of skin irritation, the patient was advised to reduce the amount of the drug applied or use it less frequently.   The patient verbalized understanding of the proper use and possible adverse effects of clindamycin.  All of the patient's questions and concerns were addressed. Spironolactone Counseling: Patient advised regarding risks of diarrhea, abdominal pain, hyperkalemia, birth defects (for female patients), liver toxicity and renal toxicity. The patient may need blood work to monitor liver and kidney function and potassium levels while on therapy. The patient verbalized understanding of the proper use and possible adverse effects of spironolactone.  All of the patient's questions and concerns were addressed. Topical Retinoid Pregnancy And Lactation Text: This medication is Pregnancy Category C. It is unknown if this medication is excreted in breast milk. Topical Sulfur Applications Pregnancy And Lactation Text: This medication is Pregnancy Category C and has an unknown safety profile during pregnancy. It is unknown if this topical medication is excreted in breast milk. Erythromycin Counseling:  I discussed with the patient the risks of erythromycin including but not limited to GI upset, allergic reaction, drug rash, diarrhea, increase in liver enzymes, and yeast infections. Include Pregnancy/Lactation Warning?: No Detail Level: Detailed Topical Sulfur Applications Counseling: Topical Sulfur Counseling: Patient counseled that this medication may cause skin irritation or allergic reactions.  In the event of skin irritation, the patient was advised to reduce the amount of the drug applied or use it less frequently.   The patient verbalized understanding of the proper use and possible adverse effects of topical sulfur application.  All of the patient's questions and concerns were addressed. Benzoyl Peroxide Pregnancy And Lactation Text: This medication is Pregnancy Category C. It is unknown if benzoyl peroxide is excreted in breast milk. Azithromycin Pregnancy And Lactation Text: This medication is considered safe during pregnancy and is also secreted in breast milk. Sarecycline Counseling: Patient advised regarding possible photosensitivity and discoloration of the teeth, skin, lips, tongue and gums.  Patient instructed to avoid sunlight, if possible.  When exposed to sunlight, patients should wear protective clothing, sunglasses, and sunscreen.  The patient was instructed to call the office immediately if the following severe adverse effects occur:  hearing changes, easy bruising/bleeding, severe headache, or vision changes.  The patient verbalized understanding of the proper use and possible adverse effects of sarecycline.  All of the patient's questions and concerns were addressed. Birth Control Pills Pregnancy And Lactation Text: This medication should be avoided if pregnant and for the first 30 days post-partum. High Dose Vitamin A Pregnancy And Lactation Text: High dose vitamin A therapy is contraindicated during pregnancy and breast feeding. Detail Level: Zone Doxycycline Pregnancy And Lactation Text: This medication is Pregnancy Category D and not consider safe during pregnancy. It is also excreted in breast milk but is considered safe for shorter treatment courses. Azithromycin Counseling:  I discussed with the patient the risks of azithromycin including but not limited to GI upset, allergic reaction, drug rash, diarrhea, and yeast infections. Isotretinoin Pregnancy And Lactation Text: This medication is Pregnancy Category X and is considered extremely dangerous during pregnancy. It is unknown if it is excreted in breast milk.

## 2020-12-29 NOTE — ED PROVIDER NOTE - PMH
Anemia    Asthma    Fracture of right fibula    IBS (irritable bowel syndrome)    Uterine leiomyoma    Vitamin D deficiency

## 2020-12-29 NOTE — ED PROVIDER NOTE - PATIENT PORTAL LINK FT
You can access the FollowMyHealth Patient Portal offered by [facility] by registering at the following website: [email or web address]. By joining [employer]’s FollowMyHealth portal, you will also be able to view your health information using other applications (apps) compatible with our system.

## 2020-12-29 NOTE — ED PROVIDER NOTE - CARE PLAN
Principal Discharge DX:	Ataxia  Secondary Diagnosis:	Acute nonintractable headache, unspecified headache type  Secondary Diagnosis:	Breast pain, right

## 2020-12-29 NOTE — ED ADULT TRIAGE NOTE - OTHER COMPLAINTS
headache, nausea, dizziness and burning pain on chest, started last friday. Son tested positive for COVID.

## 2020-12-29 NOTE — ED PROVIDER NOTE - OBJECTIVE STATEMENT
Patient reports she has been off-balance for 1 week. Also report she has been having a severe headache since 12/25, gradual onset, diffuse, throbbing. Worst was 10/10, now 8/10. No relief from tylenol, ibuprofen, excedrin. Positive nausea, photophobia, phonophobia. Also has been having burning pain in her right breast that is constant for 2 weeks. No fever, ha, cp, sob, ap. Patient states when she has been off-balance in the past, it has been found to be due to severe anemia. Triage wrote "chest pain" but patient clarified that pain is in the breast, not the chest. Has a h/o of cysts in that breast.

## 2020-12-29 NOTE — ED PROVIDER NOTE - PROGRESS NOTE DETAILS
No code stroke called due to onset of sx more than 24 hours. Patient is resting comfortably, NAD. Patient remains comfortable. I relayed CT finding and concern for CVA vs MS. Patient states that she has a strong family history of MS. I recommended admission. Patient refused. States she is very anxious about staying in hospital during COVID pandemic. I explained our precautions and that she would only be housed with other COVID negative patients. Also relayed my concern that her condition could worsen or that she could fall and injury herself, leading to permanent disability or death. Patient states understanding, still refused admission. States she has a friend who is a nurse who has agreed to come stay with her. AAOx3, speech clear. Will arrange outpatient MRI and neuro f/u . Return to the ED immediately if getting worse, not improving, or if having any new or troubling symptoms. Spoke with Dr. Krishnamurthy. She recommended against steroids until dx is known.

## 2020-12-29 NOTE — ED ADULT NURSE NOTE - ED STAT RN HANDOFF DETAILS
Pt received from SANTOS Prakash3, IV patent, no infiltration, no swelling, no redness, skin integrity maintained, safety maintained, v/s WDL, pt needs addressed, will continue to care for the pt.

## 2020-12-31 ENCOUNTER — OUTPATIENT (OUTPATIENT)
Dept: OUTPATIENT SERVICES | Facility: HOSPITAL | Age: 45
LOS: 1 days | End: 2020-12-31
Payer: MEDICAID

## 2020-12-31 ENCOUNTER — APPOINTMENT (OUTPATIENT)
Dept: MRI IMAGING | Facility: HOSPITAL | Age: 45
End: 2020-12-31

## 2020-12-31 DIAGNOSIS — G37.9 DEMYELINATING DISEASE OF CENTRAL NERVOUS SYSTEM, UNSPECIFIED: ICD-10-CM

## 2020-12-31 PROCEDURE — 70551 MRI BRAIN STEM W/O DYE: CPT | Mod: 26

## 2020-12-31 PROCEDURE — 70551 MRI BRAIN STEM W/O DYE: CPT

## 2021-01-04 ENCOUNTER — OUTPATIENT (OUTPATIENT)
Dept: OUTPATIENT SERVICES | Facility: HOSPITAL | Age: 46
LOS: 1 days | End: 2021-01-04
Payer: MEDICAID

## 2021-01-04 ENCOUNTER — APPOINTMENT (OUTPATIENT)
Dept: OBGYN | Facility: CLINIC | Age: 46
End: 2021-01-04
Payer: MEDICAID

## 2021-01-04 VITALS
TEMPERATURE: 98.2 F | DIASTOLIC BLOOD PRESSURE: 79 MMHG | HEART RATE: 75 BPM | SYSTOLIC BLOOD PRESSURE: 118 MMHG | WEIGHT: 168 LBS | BODY MASS INDEX: 24.88 KG/M2 | RESPIRATION RATE: 18 BRPM | OXYGEN SATURATION: 100 % | HEIGHT: 69 IN

## 2021-01-04 DIAGNOSIS — N64.59 OTHER SIGNS AND SYMPTOMS IN BREAST: ICD-10-CM

## 2021-01-04 DIAGNOSIS — Z00.00 ENCOUNTER FOR GENERAL ADULT MEDICAL EXAMINATION WITHOUT ABNORMAL FINDINGS: ICD-10-CM

## 2021-01-04 PROCEDURE — G0463: CPT

## 2021-01-04 PROCEDURE — 99213 OFFICE O/P EST LOW 20 MIN: CPT | Mod: NC

## 2021-01-04 NOTE — PHYSICAL EXAM
[FreeTextEntry2] : concerned and anxious  [Examination Of The Breasts] : a normal appearance [Normal] : normal [] : multiple nodules were palpated

## 2021-01-05 ENCOUNTER — APPOINTMENT (OUTPATIENT)
Dept: NEUROLOGY | Facility: CLINIC | Age: 46
End: 2021-01-05
Payer: MEDICAID

## 2021-01-05 VITALS
TEMPERATURE: 98.4 F | OXYGEN SATURATION: 99 % | DIASTOLIC BLOOD PRESSURE: 76 MMHG | HEART RATE: 76 BPM | SYSTOLIC BLOOD PRESSURE: 120 MMHG | WEIGHT: 165 LBS | BODY MASS INDEX: 24.44 KG/M2 | HEIGHT: 69 IN

## 2021-01-05 DIAGNOSIS — Z87.09 PERSONAL HISTORY OF OTHER DISEASES OF THE RESPIRATORY SYSTEM: ICD-10-CM

## 2021-01-05 DIAGNOSIS — N64.59 OTHER SIGNS AND SYMPTOMS IN BREAST: ICD-10-CM

## 2021-01-05 DIAGNOSIS — Z82.0 FAMILY HISTORY OF EPILEPSY AND OTHER DISEASES OF THE NERVOUS SYSTEM: ICD-10-CM

## 2021-01-05 DIAGNOSIS — N64.9 DISORDER OF BREAST, UNSPECIFIED: ICD-10-CM

## 2021-01-05 DIAGNOSIS — N64.4 MASTODYNIA: ICD-10-CM

## 2021-01-05 PROCEDURE — 99205 OFFICE O/P NEW HI 60 MIN: CPT

## 2021-01-05 PROCEDURE — 99072 ADDL SUPL MATRL&STAF TM PHE: CPT

## 2021-01-05 RX ORDER — ALBUTEROL SULFATE 2.5 MG/3ML
(2.5 MG/3ML) SOLUTION RESPIRATORY (INHALATION)
Refills: 0 | Status: ACTIVE | COMMUNITY

## 2021-01-05 NOTE — CONSULT LETTER
[Dear  ___] : Dear  [unfilled], [Consult Letter:] : I had the pleasure of evaluating your patient, [unfilled]. [Please see my note below.] : Please see my note below. [Consult Closing:] : Thank you very much for allowing me to participate in the care of this patient.  If you have any questions, please do not hesitate to contact me. [Sincerely,] : Sincerely, [FreeTextEntry3] : Eryn Krishnamurthy MD, MPH\par

## 2021-01-05 NOTE — PHYSICAL EXAM
[General Appearance - Alert] : alert [General Appearance - In No Acute Distress] : in no acute distress [Person] : oriented to person [Place] : oriented to place [Time] : oriented to time [Registration Intact] : recent registration memory intact [Concentration Intact] : normal concentrating ability [Visual Intact] : visual attention was ~T not ~L decreased [Naming Objects] : no difficulty naming common objects [Repeating Phrases] : no difficulty repeating a phrase [Fluency] : fluency intact [Comprehension] : comprehension intact [Vocabulary] : adequate range of vocabulary [Cranial Nerves Optic (II)] : visual acuity intact bilaterally,  visual fields full to confrontation, pupils equal round and reactive to light [Cranial Nerves Oculomotor (III)] : extraocular motion intact [Cranial Nerves Trigeminal (V)] : facial sensation intact symmetrically [Cranial Nerves Vestibulocochlear (VIII)] : hearing was intact bilaterally [Cranial Nerves Glossopharyngeal (IX)] : tongue and palate midline [Cranial Nerves Accessory (XI - Cranial And Spinal)] : head turning and shoulder shrug symmetric [Cranial Nerves Hypoglossal (XII)] : there was no tongue deviation with protrusion [Cranial Nerves Facial Central - Left Only] : central 7th nerve weakness [Motor Tone] : muscle tone was normal in all four extremities [Motor Strength] : muscle strength was normal in all four extremities [Involuntary Movements] : no involuntary movements were seen [Sensation Tactile Decrease] : light touch was intact [Abnormal Walk] : normal gait [Balance] : balance was intact [1+] : Ankle jerk left 1+ [Full Pulse] : the pedal pulses are present [Cranial Nerves Facial Central - Right Only] : no central 7th nerve weakness [Coordination - Dysmetria Impaired Finger-to-Nose Bilateral] : not present [Coordination - Dysmetria Impaired Heel-to-Shin Bilateral] : not present [Plantar Reflex Right Only] : normal on the right [Plantar Reflex Left Only] : normal on the left [FreeTextEntry5] : fundi not visualized

## 2021-01-05 NOTE — HISTORY OF PRESENT ILLNESS
[FreeTextEntry1] : The patient was in [facility] ED for severe headache since 12/25, gradual onset, diffuse, throbbing. Worst was 10/10, now 8/10. No relief from tylenol, ibuprofen, excedrin. Positive nausea, photophobia, phonophobia. Her headaches have improved since then but she continues to have them, she takes Tylenol with some improvement but she continues to have the.  Has about 3 headaches per month.  She has history of headaches in the past but  not as severe.  \par \par Patient states when she has been off-balance, not to any specific side.  No dizziness, vertigo, double or loss or vision, difficulty with speaking/ swallowing or language.  No focal weakness or sensory changes.\par \par The patient has discomfort in her leg with movement of the leg at night, relief of the discomfort when she moves her leg.  She has severe anemia, she was getting Iron IV in 2019 but stopped it in 2020, she is on po supplements currently.\par \par The patient had COVID 10 in April 2020, headache, fever, myalgias.

## 2021-01-05 NOTE — ASSESSMENT
[FreeTextEntry1] : Migraines without aura.  Will give Medrol pack to stop the headache and will start Naproxyn prn at onset of headache and repeat in 2 hours if headache still continues, max 3 days per week.\par \par Restless leg syndrome due to Iron deficiency, will recheck labs, patient advised to restart Iron IV.\par \par Balance problems for unclear etiology, MRI brain unremarkable, will continue to monitor and will consider ncs.emg.

## 2021-01-05 NOTE — DATA REVIEWED
[de-identified] : reviewed: unremarkable [de-identified] : EKG NSR, \par cbc, cmp notable for microcytic anemia

## 2021-01-07 LAB
CHOLEST SERPL-MCNC: 187 MG/DL
CHOLEST SERPL-MCNC: 187 MG/DL
CHOLEST/HDLC SERPL: 3.8 RATIO
FERRITIN SERPL-MCNC: 5 NG/ML
HDLC SERPL-MCNC: 50 MG/DL
IRON SATN MFR SERPL: 4 %
IRON SERPL-MCNC: 18 UG/DL
LDLC SERPL CALC-MCNC: 120 MG/DL
NONHDLC SERPL-MCNC: 137 MG/DL
TIBC SERPL-MCNC: 468 UG/DL
TRIGL SERPL-MCNC: 88 MG/DL
UIBC SERPL-MCNC: 451 UG/DL

## 2021-03-22 ENCOUNTER — RX RENEWAL (OUTPATIENT)
Age: 46
End: 2021-03-22

## 2021-03-22 RX ORDER — ALBUTEROL SULFATE 90 UG/1
108 (90 BASE) INHALANT RESPIRATORY (INHALATION)
Qty: 1 | Refills: 5 | Status: ACTIVE | COMMUNITY
Start: 2021-03-22 | End: 1900-01-01

## 2021-04-07 ENCOUNTER — APPOINTMENT (OUTPATIENT)
Dept: NEUROLOGY | Facility: CLINIC | Age: 46
End: 2021-04-07

## 2021-04-29 ENCOUNTER — APPOINTMENT (OUTPATIENT)
Dept: NEUROLOGY | Facility: CLINIC | Age: 46
End: 2021-04-29
Payer: MEDICAID

## 2021-04-29 VITALS
DIASTOLIC BLOOD PRESSURE: 69 MMHG | TEMPERATURE: 98.4 F | OXYGEN SATURATION: 98 % | BODY MASS INDEX: 24.44 KG/M2 | HEART RATE: 80 BPM | SYSTOLIC BLOOD PRESSURE: 131 MMHG | HEIGHT: 69 IN | WEIGHT: 165 LBS

## 2021-04-29 DIAGNOSIS — G25.81 RESTLESS LEGS SYNDROME: ICD-10-CM

## 2021-04-29 PROCEDURE — 99214 OFFICE O/P EST MOD 30 MIN: CPT

## 2021-04-29 PROCEDURE — 99072 ADDL SUPL MATRL&STAF TM PHE: CPT

## 2021-04-29 NOTE — PHYSICAL EXAM
[General Appearance - Alert] : alert [General Appearance - In No Acute Distress] : in no acute distress [Person] : oriented to person [Place] : oriented to place [Time] : oriented to time [Naming Objects] : no difficulty naming common objects [Repeating Phrases] : no difficulty repeating a phrase [Fluency] : fluency intact [Comprehension] : comprehension intact [Vocabulary] : adequate range of vocabulary [Motor Tone] : muscle tone was normal in all four extremities [Motor Strength] : muscle strength was normal in all four extremities [Involuntary Movements] : no involuntary movements were seen [Sensation Tactile Decrease] : light touch was intact [Romberg's Sign] : a positive Romberg's sign was present [Limited Balance] : the patient's balance was impaired

## 2021-04-29 NOTE — DATA REVIEWED
[de-identified] : \par EXAM: MR BRAIN\par \par \par PROCEDURE DATE: 12/31/2020\par \par \par \par INTERPRETATION: CLINICAL STATEMENT: Headaches.\par \par TECHNIQUE: MRI of the brain was performed without gadolinium.\par \par COMPARISON: CT head 12/29/2020\par \par FINDINGS:\par Nonspecific scattered punctate T2 prolongation signal abnormalities in the periventricular subcortical white matter which may be related to gliosis, migraine headache affects,, demyelinating disease, other inflammatory conditions.\par \par There is no acute parenchymal hemorrhage, parenchymal mass, mass effect or midline shift. There is no extra-axial fluid collection. There is no hydrocephalus. There is no acute infarct.\par \par Previously seen patchy hypodensities in the left frontal lobe and right parietal lobe on the CT exam are compatible with artifacts\par \par Diffuse low T1 bone marrow signal noted nonspecific likely related to red marrow hyperplasia in the correct clinical setting.\par \par Mild mucosal thickening paranasal sinuses.\par \par IMPRESSION:\par No acute intracranial hemorrhage or acute infarct.\par \par Additional findings as described above\par \par \par \par \par \par JONATHAN SANTANA MD; Attending Radiologist\par This document has been electronically signed. Dec 31 2020 11:25AM [de-identified] : Iron level low, TIBC high (1/21)

## 2021-04-29 NOTE — HISTORY OF PRESENT ILLNESS
[FreeTextEntry1] : The patient was in [facility] ED for severe headache since 12/25, gradual onset, diffuse, throbbing. There was nausea, photophobia, phonophobia. Her headaches have improved since then but she continues to have them, she takes Tylenol with some improvement but she continues to have the. Has about 3 headaches per month.  No relief from ibuprofen, excedrin; has some relief with tylenol.\par \par Patient states when she has been off-balance, not to any specific side; worse when closes her eyes. No dizziness, vertigo, double or loss or vision, difficulty with speaking/ swallowing or language. No focal weakness or sensory changes.  Has not had ncs.emg te.\par \par The patient has discomfort in her leg with movement of the leg at night, relief of the discomfort when she moves her leg. She has severe anemia, she was getting Iron IV in 2019 but stopped it in 2020, she is on po supplements currently.  \par \par The patient had COVID 10 in April 2020, headache, fever, myalgias.

## 2021-04-29 NOTE — ASSESSMENT
[FreeTextEntry1] : Migraines without aura, improved.  cw Tylenol prn one tab at onset of headache and repeat in 2 hours if headache still continues, max 3 days per week.\par \par Restless leg syndrome due to Iron deficiency, with low Iron.  The patient will fu with PMD to restart Iron IV.\par \par Balance problems for unclear etiology, MRI brain unremarkable, will get ncs.emg. \par

## 2021-07-06 ENCOUNTER — NON-APPOINTMENT (OUTPATIENT)
Age: 46
End: 2021-07-06

## 2021-07-09 ENCOUNTER — RESULT REVIEW (OUTPATIENT)
Age: 46
End: 2021-07-09

## 2021-07-09 ENCOUNTER — OUTPATIENT (OUTPATIENT)
Dept: OUTPATIENT SERVICES | Facility: HOSPITAL | Age: 46
LOS: 1 days | End: 2021-07-09
Payer: MEDICAID

## 2021-07-09 ENCOUNTER — APPOINTMENT (OUTPATIENT)
Dept: ULTRASOUND IMAGING | Facility: IMAGING CENTER | Age: 46
End: 2021-07-09

## 2021-07-09 ENCOUNTER — APPOINTMENT (OUTPATIENT)
Dept: MAMMOGRAPHY | Facility: IMAGING CENTER | Age: 46
End: 2021-07-09
Payer: MEDICAID

## 2021-07-09 DIAGNOSIS — Z00.8 ENCOUNTER FOR OTHER GENERAL EXAMINATION: ICD-10-CM

## 2021-07-09 PROCEDURE — 76641 ULTRASOUND BREAST COMPLETE: CPT

## 2021-07-09 PROCEDURE — G0279: CPT

## 2021-07-09 PROCEDURE — 77066 DX MAMMO INCL CAD BI: CPT

## 2021-07-09 PROCEDURE — 77062 BREAST TOMOSYNTHESIS BI: CPT | Mod: 26

## 2021-07-09 PROCEDURE — 76641 ULTRASOUND BREAST COMPLETE: CPT | Mod: 26,RT

## 2021-07-09 PROCEDURE — 77066 DX MAMMO INCL CAD BI: CPT | Mod: 26

## 2021-08-02 ENCOUNTER — APPOINTMENT (OUTPATIENT)
Dept: INTERNAL MEDICINE | Facility: CLINIC | Age: 46
End: 2021-08-02

## 2021-08-04 ENCOUNTER — NON-APPOINTMENT (OUTPATIENT)
Age: 46
End: 2021-08-04

## 2021-08-06 ENCOUNTER — OUTPATIENT (OUTPATIENT)
Dept: OUTPATIENT SERVICES | Facility: HOSPITAL | Age: 46
LOS: 1 days | End: 2021-08-06
Payer: MEDICAID

## 2021-08-06 ENCOUNTER — APPOINTMENT (OUTPATIENT)
Dept: OBGYN | Facility: CLINIC | Age: 46
End: 2021-08-06
Payer: MEDICAID

## 2021-08-06 VITALS
SYSTOLIC BLOOD PRESSURE: 119 MMHG | WEIGHT: 172 LBS | TEMPERATURE: 98.2 F | HEIGHT: 69 IN | OXYGEN SATURATION: 100 % | BODY MASS INDEX: 25.48 KG/M2 | DIASTOLIC BLOOD PRESSURE: 72 MMHG | RESPIRATION RATE: 18 BRPM | HEART RATE: 93 BPM

## 2021-08-06 DIAGNOSIS — Z00.00 ENCOUNTER FOR GENERAL ADULT MEDICAL EXAMINATION WITHOUT ABNORMAL FINDINGS: ICD-10-CM

## 2021-08-06 DIAGNOSIS — Z86.018 PERSONAL HISTORY OF OTHER BENIGN NEOPLASM: ICD-10-CM

## 2021-08-06 PROCEDURE — 99213 OFFICE O/P EST LOW 20 MIN: CPT

## 2021-08-06 PROCEDURE — G0463: CPT

## 2021-08-06 NOTE — HISTORY OF PRESENT ILLNESS
[Patient reported PAP Smear was normal] : Patient reported PAP Smear was normal [N] : Patient is not sexually active [Y] : Positive pregnancy history [FreeTextEntry1] : Patient is here for f/u management of symptomatic fibroid, c/o of heavy menstrual period since 7/14/21. \par \par Patient presented with hx/o symptomatic fibroid uterus, consulted with us (09/2021), advised to RTO for endometrial biopsy and obtain pelvic ultrasound.  Patient didn't return for procedure, returned (01/04/21) for consultation, given referral again for pelvic ultrasound didn't f/u as recommended.   Presents today for referral for [place] procedure.  Patient was advised we need to repeat Pelvic ultrasound and obtain endometrial biopsy prior to referring patient for consultation.  Patient expresses understanding and will f/u as suggested today. \par \par  [Mammogramdate] : 7/9/21 [PapSmeardate] : 12/21/17 [LMPDate] : 7/14/21 [PGHxTotal] : 5 [[facility] [PGHxPremature] : 0 [PGHxAbortions] : 3 [[facility]] : 2 [PGHxABInduced] : 1 [PGHxABSpont] : 2 [PGHxEctopic] : 0 [PGHxMultBirths] : 0

## 2021-08-09 DIAGNOSIS — Z86.018 PERSONAL HISTORY OF OTHER BENIGN NEOPLASM: ICD-10-CM

## 2021-08-09 DIAGNOSIS — Z87.42 PERSONAL HISTORY OF OTHER DISEASES OF THE FEMALE GENITAL TRACT: ICD-10-CM

## 2021-08-16 ENCOUNTER — APPOINTMENT (OUTPATIENT)
Dept: ULTRASOUND IMAGING | Facility: HOSPITAL | Age: 46
End: 2021-08-16
Payer: MEDICAID

## 2021-08-16 ENCOUNTER — OUTPATIENT (OUTPATIENT)
Dept: OUTPATIENT SERVICES | Facility: HOSPITAL | Age: 46
LOS: 1 days | End: 2021-08-16
Payer: MEDICAID

## 2021-08-16 DIAGNOSIS — Z86.018 PERSONAL HISTORY OF OTHER BENIGN NEOPLASM: ICD-10-CM

## 2021-08-16 PROCEDURE — 76830 TRANSVAGINAL US NON-OB: CPT | Mod: 26

## 2021-08-16 PROCEDURE — 76856 US EXAM PELVIC COMPLETE: CPT | Mod: 26

## 2021-08-16 PROCEDURE — 76830 TRANSVAGINAL US NON-OB: CPT

## 2021-08-16 PROCEDURE — 76856 US EXAM PELVIC COMPLETE: CPT

## 2021-08-24 ENCOUNTER — APPOINTMENT (OUTPATIENT)
Dept: MAMMOGRAPHY | Facility: HOSPITAL | Age: 46
End: 2021-08-24

## 2021-08-24 ENCOUNTER — APPOINTMENT (OUTPATIENT)
Dept: ULTRASOUND IMAGING | Facility: HOSPITAL | Age: 46
End: 2021-08-24

## 2021-08-26 PROBLEM — R92.2 DENSE BREASTS: Status: ACTIVE | Noted: 2021-08-26

## 2021-08-27 ENCOUNTER — APPOINTMENT (OUTPATIENT)
Dept: SURGICAL ONCOLOGY | Facility: CLINIC | Age: 46
End: 2021-08-27
Payer: MEDICAID

## 2021-08-27 VITALS
WEIGHT: 170 LBS | SYSTOLIC BLOOD PRESSURE: 116 MMHG | HEIGHT: 69 IN | OXYGEN SATURATION: 100 % | BODY MASS INDEX: 25.18 KG/M2 | DIASTOLIC BLOOD PRESSURE: 80 MMHG | HEART RATE: 74 BPM

## 2021-08-27 DIAGNOSIS — R92.2 INCONCLUSIVE MAMMOGRAM: ICD-10-CM

## 2021-08-27 DIAGNOSIS — Z78.9 OTHER SPECIFIED HEALTH STATUS: ICD-10-CM

## 2021-08-27 PROCEDURE — 99243 OFF/OP CNSLTJ NEW/EST LOW 30: CPT

## 2021-08-27 NOTE — ASSESSMENT
[FreeTextEntry1] : The patient is a 45 year old female with R breast pain and recent imaging showing R breast nodules, some previously biopsied and found to be FA. BR2.\par \par We discussed breast pain, which is one of the most common breast complaints seen in the primary care setting. \par \par We discussed that spontaneous resolution of breast pain is common. The patient was reassured that breast pain is not a risk factor for breast cancer. NSAIDs are appropriate to help with pain. A well-fitting bra or sports bra may also help with compression and support of the bra and help reduce any breast discomfort.\par \par Given the extremely dense breasts on mammogram and the TC 31.1% score, I would recommend adding a L breast US now, and then a screening MRI next year.\par \par The patient was strongly encouraged to follow-up with a gynecologist regarding her severe menstrual bleeding for a myomectomy or hysterectomy. \par \par Ms Cowan verbalized her understanding of the plan, and risks/benefits/complications and alternatives were discussed. All questions were answered. She knows to call me if she has any additional questions or concerns. \par \par Plan:\par  - L US now\par  - 1/2022 MRI\par  - RTO 1/2022\par  - Pt encouraged to prioritize seeing GYN, and should  go to Urgent Care or the ED if she noted more bleeding and develops or has worsening symptoms of anemia (lightheadedness, SOB, chest pain)

## 2021-08-27 NOTE — CONSULT LETTER
[Dear  ___] : Dear  [unfilled], [Consult Letter:] : I had the pleasure of evaluating your patient, [unfilled]. [Please see my note below.] : Please see my note below. [Consult Closing:] : Thank you very much for allowing me to participate in the care of this patient.  If you have any questions, please do not hesitate to contact me. [Sincerely,] : Sincerely, [DrMyron  ___] : Dr. RANDOLPH [FreeTextEntry3] : Abiola Valdez MD\par Breast Surgeon\par Division of Surgical Oncology\par Department of Surgery\par [address] [place] [address]\[place] [address] \par Tel: [phone]\par Fax: [phone]\par Email: [email or web address]

## 2021-08-27 NOTE — REVIEW OF SYSTEMS
[Feeling Poorly] : feeling poorly [Feeling Tired] : feeling tired [Shortness Of Breath] : shortness of breath [Joint Stiffness] : joint stiffness [As Noted in HPI] : as noted in HPI [Negative] : Heme/Lymph [FreeTextEntry9] : R ankle fracture

## 2021-08-27 NOTE — HISTORY OF PRESENT ILLNESS
[FreeTextEntry1] : The patient is a 45 year old female referred for consultation by Dr. Vida Lyons for R breast pain and masses.\par \par The patient reports that she previously had B/L breast excisional biopsies approximately 10 years ago for benign masses. She's continued to have B/L breast masses since then. She notes R breast tenderness worsening over the past few months, currently worse now because of her period. \par \par Of note, the patient also reports significant vaginal bleeding due to her fibroids. She reports history of being severely anemia (reportedly Hg 4) requiring transfusion and iron supplementation. She is currently looking for a gynecologist to perform her hysterectomy.\par \par Prior imaging:\par 7/09/2021 B/L DM ([facility]) revealed extremely dense breasts\par  - L central upper breast 20 mm stable mass (scar marker)\par  - R upper breast scar marker\par  - R upper outer circumscribed mass, stable\par  - R extreme posteromedial biopsy marker\par \par 7/09/2021 R US\par  - R 12:00 N3 16 x 6 x 10 mm stable ovoid solid nodule\par  - R 2:00 N5 6 x 3 x 4 mm stable hypoechoic nodule \par  - R 3:00 N10 6 x 4 x 8 mm FA (bx'ed), stable\par  - R 4:00 N2 6 x 3 x 7 mm stable circumscribed hypoechoic nodule \par  - R 8:00 N6 15 x 10 x 15 mm stable circumscribed solid nodule\par  - R 9:00 N7 20 x 14 x 21 mm stable circumscribed solid nodule\par  - R 10:00 N7 12 x 5 x 8 mm stable circumscribed solid nodule\par  - R 12:00 N7 16 x 7 x 10 mm stable solid nodule, previously biopsied\par  - R 4:00 N5 (pain) - negative\par  - BR2\par \par Denies breast skin changes, or nipple discharge. \par \par Denies family history of breast cancer.

## 2021-08-27 NOTE — PAST MEDICAL HISTORY
[Menstruating] : The patient is menstruating [Menarche Age ____] : age at menarche was [unfilled] [History of Hormone Replacement Treatment] : has no history of hormone replacement treatment [Definite ___ (Date)] : the last menstrual period was [unfilled] [Irregular Cycle Intervals] : are  irregular [Dysmenorrhea] : dysmenorrhea [Total Preg ___] : G[unfilled] [Live Births ___] : P[unfilled]  [AB Spont ___] : miscarriages: [unfilled]  [Age At Live Birth ___] : Age at live birth: [unfilled] [FreeTextEntry5] : 2 breast excisional biopsies-benign, approx 2010 [FreeTextEntry6] : none [FreeTextEntry7] : none [FreeTextEntry8] : none

## 2021-08-27 NOTE — PHYSICAL EXAM
[Normocephalic] : normocephalic [Atraumatic] : atraumatic [Supple] : supple [No Supraclavicular Adenopathy] : no supraclavicular adenopathy [No Cervical Adenopathy] : no cervical adenopathy [Examined in the supine and seated position] : examined in the supine and seated position [Asymmetrical] : asymmetrical [Bra Size: ___] : Bra Size: [unfilled] [Grade 2] : Ptosis Grade 2 [No dominant masses] : no dominant masses left breast [No Nipple Retraction] : no left nipple retraction [No Nipple Discharge] : no left nipple discharge [Breast Nipple Inversion] : nipples not inverted [Breast Nipple Retraction] : nipples not retracted [Breast Nipple Flattening] : nipples not flattened [Breast Nipple Fissures] : nipples not fissured [Breast Abnormal Lactation (Galactorrhea)] : no galactorrhea [Breast Abnormal Secretion Bloody Fluid] : no bloody discharge [Breast Abnormal Secretion Serous Fluid] : no serous discharge [Breast Abnormal Secretion Opalescent Fluid] : no milky discharge [No Axillary Lymphadenopathy] : no left axillary lymphadenopathy [No Edema] : no edema [No Swelling] : no swelling [Full ROM] : full range of motion [de-identified] : pale appearing [de-identified] : non-labored respirations. Speaking normally  [de-identified] : Right greater than left [de-identified] : Fibronodular tissue throughout. Dominant 2 cm mass noted at 9:00, circumscribed, mobile. Tender to palpation [de-identified] : Fibronodular tissue throughout. Tender to palpation [de-identified] : B/L striae

## 2021-09-03 ENCOUNTER — APPOINTMENT (OUTPATIENT)
Dept: OBGYN | Facility: CLINIC | Age: 46
End: 2021-09-03

## 2021-09-29 ENCOUNTER — APPOINTMENT (OUTPATIENT)
Dept: OBGYN | Facility: CLINIC | Age: 46
End: 2021-09-29

## 2021-10-05 ENCOUNTER — APPOINTMENT (OUTPATIENT)
Dept: NEUROLOGY | Facility: CLINIC | Age: 46
End: 2021-10-05
Payer: MEDICAID

## 2021-10-05 VITALS
HEIGHT: 69 IN | SYSTOLIC BLOOD PRESSURE: 127 MMHG | BODY MASS INDEX: 25.18 KG/M2 | HEART RATE: 91 BPM | DIASTOLIC BLOOD PRESSURE: 79 MMHG | OXYGEN SATURATION: 98 % | TEMPERATURE: 97.3 F | WEIGHT: 170 LBS

## 2021-10-05 PROCEDURE — 95911 NRV CNDJ TEST 9-10 STUDIES: CPT

## 2021-10-05 NOTE — PROCEDURE
[FreeTextEntry3] : Nerve conduction EMG of the legs shows no electrodiagnostic evidence of large fiber neuropathy.

## 2021-10-28 ENCOUNTER — APPOINTMENT (OUTPATIENT)
Dept: OBGYN | Facility: CLINIC | Age: 46
End: 2021-10-28

## 2021-11-18 ENCOUNTER — APPOINTMENT (OUTPATIENT)
Dept: NEUROLOGY | Facility: CLINIC | Age: 46
End: 2021-11-18

## 2021-12-02 ENCOUNTER — APPOINTMENT (OUTPATIENT)
Dept: OBGYN | Facility: CLINIC | Age: 46
End: 2021-12-02
Payer: MEDICAID

## 2021-12-02 ENCOUNTER — OUTPATIENT (OUTPATIENT)
Dept: OUTPATIENT SERVICES | Facility: HOSPITAL | Age: 46
LOS: 1 days | End: 2021-12-02
Payer: MEDICAID

## 2021-12-02 ENCOUNTER — RESULT REVIEW (OUTPATIENT)
Age: 46
End: 2021-12-02

## 2021-12-02 ENCOUNTER — LABORATORY RESULT (OUTPATIENT)
Age: 46
End: 2021-12-02

## 2021-12-02 VITALS — WEIGHT: 170 LBS | BODY MASS INDEX: 25.1 KG/M2 | DIASTOLIC BLOOD PRESSURE: 90 MMHG | SYSTOLIC BLOOD PRESSURE: 142 MMHG

## 2021-12-02 DIAGNOSIS — N76.0 ACUTE VAGINITIS: ICD-10-CM

## 2021-12-02 PROCEDURE — 58100 BIOPSY OF UTERUS LINING: CPT | Mod: NC

## 2021-12-02 PROCEDURE — 99212 OFFICE O/P EST SF 10 MIN: CPT | Mod: GC,25

## 2021-12-02 PROCEDURE — 85027 COMPLETE CBC AUTOMATED: CPT

## 2021-12-02 PROCEDURE — 81025 URINE PREGNANCY TEST: CPT

## 2021-12-02 PROCEDURE — G0463: CPT

## 2021-12-03 ENCOUNTER — APPOINTMENT (OUTPATIENT)
Dept: NEUROLOGY | Facility: CLINIC | Age: 46
End: 2021-12-03
Payer: MEDICAID

## 2021-12-03 VITALS
SYSTOLIC BLOOD PRESSURE: 123 MMHG | WEIGHT: 170 LBS | TEMPERATURE: 97.7 F | HEIGHT: 69 IN | HEART RATE: 77 BPM | OXYGEN SATURATION: 98 % | DIASTOLIC BLOOD PRESSURE: 74 MMHG | BODY MASS INDEX: 25.18 KG/M2

## 2021-12-03 DIAGNOSIS — G43.109 MIGRAINE WITH AURA, NOT INTRACTABLE, W/OUT STATUS MIGRAINOSUS: ICD-10-CM

## 2021-12-03 LAB
HCT VFR BLD CALC: 40.3 % — SIGNIFICANT CHANGE UP (ref 34.5–45)
HGB BLD-MCNC: 11.9 G/DL — SIGNIFICANT CHANGE UP (ref 11.5–15.5)
MCHC RBC-ENTMCNC: 24.7 PG — LOW (ref 27–34)
MCHC RBC-ENTMCNC: 29.5 GM/DL — LOW (ref 32–36)
MCV RBC AUTO: 83.6 FL — SIGNIFICANT CHANGE UP (ref 80–100)
PLATELET # BLD AUTO: 267 K/UL — SIGNIFICANT CHANGE UP (ref 150–400)
RBC # BLD: 4.82 M/UL — SIGNIFICANT CHANGE UP (ref 3.8–5.2)
RBC # FLD: 18.9 % — HIGH (ref 10.3–14.5)
WBC # BLD: 5.79 K/UL — SIGNIFICANT CHANGE UP (ref 3.8–10.5)
WBC # FLD AUTO: 5.79 K/UL — SIGNIFICANT CHANGE UP (ref 3.8–10.5)

## 2021-12-03 PROCEDURE — 99214 OFFICE O/P EST MOD 30 MIN: CPT

## 2021-12-03 RX ORDER — MAGNESIUM OXIDE 241.3 MG/1000MG
400 TABLET ORAL
Qty: 30 | Refills: 5 | Status: ACTIVE | COMMUNITY
Start: 2021-12-03 | End: 1900-01-01

## 2021-12-03 NOTE — DATA REVIEWED
[de-identified] : Nerve conduction EMG of the legs was unremarkable for large fiber neuropathy.\par GYN note appreciated\par CBC noted

## 2021-12-03 NOTE — HISTORY OF PRESENT ILLNESS
[FreeTextEntry1] : The patient has history of migraine described as diffuse, throbbing headache.  There is nausea, photophobia, phonophobia.  She also has tingling in the left side of her face prior to the headaches.  No relief from ibuprofen, excedrin; has some relief with tylenol.  She was started on naproxen 375 mg as needed to take at onset of aura with some improvement of the headache, however the patient continues to have debilitating headaches about 4 times per month.  She is interested in better and more natural regimens to control her headaches.\par \par Patient states when she has been off-balance, not to any specific side; worse when closes her eyes. No dizziness, vertigo, double or loss or vision, difficulty with speaking/ swallowing or language. No focal weakness or sensory changes.  Nerve conduction EMG was unremarkable for large fiber neuropathy.\par \par The patient has discomfort in her leg with movement of the leg at night, relief of the discomfort when she moves her leg. She has severe anemia, she was getting Iron IV in 2019 but stopped it in 2020, she is on po supplements currently. \par \par The patient had COVID 10 in April 2020, headache, fever, myalgias. \par

## 2021-12-03 NOTE — ASSESSMENT
[FreeTextEntry1] : Migraines with aura described as left facial tingling as well as complicated migraine.  MRI of the brain was previously unremarkable.  The headaches occur 4 times per month, the patient is interested in natural regimens of medication for control of the headaches.  We will start riboflavin 400 mg/day for headache prevention and will advise the patient takes magnesium  400 mg 1 tablet at onset of headache or as needed.  The patient could also try naproxen as needed at onset of headaches and repeat in 2 hours if the headache continues, if there is no relief with the magnesium.  Can consider increasing the dose of naproxen to 550 mg at next visit.  \par \par The patient understands that given the fact that she has tingling in the left side of her face with a migraine, she has complicated migraine and she should never deteriorate with medications that belong to triptan class of medications, such as Imitrex.\par \par Headache diary given.\par \par Restless leg syndrome due to Iron deficiency, with low Iron. The patient will fu with PMD to restart Iron IV.\par \par Balance problems for unclear etiology, MRI brain unremarkable, will get ncs.emg. \par  \par I spent the time noted on the day of this patient encounter preparing for, providing and documenting the above E/M service and counseling and educate patient on differential, workup, disease course, and treatment/management. Education was provided to the patient during this encounter. All questions and concerns were answered and addressed in detail. The patient verbalized understanding and agreed to plan. Patient was advised to continue to monitor for neurologic symptoms and to notify my office or go to the nearest emergency room if there are any changes. Any orders/referrals and communications were provided as well. \par Side effects of the above medications were discussed in detail including but not limited to applicable black box warning and teratogenicity as appropriate. \par Patient was advised to bring previous records to my office, including CD of imaging, when applicable. \par \par \par

## 2021-12-04 NOTE — END OF VISIT
[Resident] : Resident [FreeTextEntry3] : Agree with above assessment, Understands most definitive treament for AUB-L would be a hysteretomy. Pt desires an UAE. Referral given\par \par Vickie Melendez MD \par  [FreeTextEntry2] : uncomplicated EMB\par Vickie Melendez MD

## 2021-12-04 NOTE — PROCEDURE
[Endometrial Biopsy] : Endometrial biopsy [Irregular Bleeding] : irregular uterine bleeding [Risks] : risks [Benefits] : benefits [Patient] : patient [Bleeding] : bleeding [Uterine Perforation] : uterine perforation [Pain] : pain [Negative] : negative pregnancy test [No Premedication] : No premedication [None] : none [Easy Passage] : Easy passage [Anteverted] : anteverted [Moderate] : moderate [Sent to Pathology] : placed in buffered formalin and sent for pathology [Tolerated Well] : Patient tolerated the procedure well [No Complications] : No complications [LMPDate] : 1 [de-identified] : Wei clamp

## 2021-12-04 NOTE — HISTORY OF PRESENT ILLNESS
[FreeTextEntry1] : MS Cowan is a 46y  presenting for management of AUB-L. She has a known fibroids uterus \par and reports  most recent LMP  - 10/2021 with 3 months of constant vaginal bleeding & variable volume of flow but so heavy and consistent she wears a diaper and suffers from symptomatic anemia (SOB, fatigue). She did not have her period in November. She reports heavy bleeding for many years but worsening in the volume, duration, and associated pain for about 1y. She reports baseline mild intermittent pelvic pain which intensifies during menses & is not alleviated by tylneol/motrin/heating pad. pain is worst in LLQ. She declines medical management with OCPs, IUD, and is not willing to undergo any type of open or laparoscopic procedure. She is agreeable to a hysteroscopic procedure if her fibroids are accessible and/or an UAE.  \par \par Most recent  TVUS (21)\par Uterus 13.4 x 10.6 x 8.4cm, multiple fibroids visualized largest 5.0cm increased in size from 3.6cm. Endometrium 8mm, adnexa not visualized, no free fluid.  \par \par ObGyn: Dr. Humphreys\par :  (, ), TOP, SAB \par Deniies any abnormal pap, STIs, rarely sexually active, no desire for future fertility \par \par PMH: asthma (no hospitalization/intubation) - last took steroids/rescue inhaler/nebulizer 2020 with COVID, broken ankle, anemia \par SurgHx: ankle surgery (7/15/2020)\par Meds: MV, PO Fe (s/p IV Fe prior to COVID ), Zinc\par FHx: denies \par SocHx: unemployed, denies A/T/D\par

## 2021-12-04 NOTE — PHYSICAL EXAM
[Appropriately responsive] : appropriately responsive [Alert] : alert [No Acute Distress] : no acute distress [Soft] : soft [Non-distended] : non-distended [Oriented x3] : oriented x3 [FreeTextEntry5] : breathing comfortably on RA [FreeTextEntry7] : 8w sized bulky uterus, anteverted, tenderness to palpation in LLQ

## 2021-12-04 NOTE — REVIEW OF SYSTEMS
[Chills] : chills [Fatigue] : fatigue [SOB on Exertion] : shortness of breath on exertion [Abdominal Pain] : abdominal pain [Bloating] : bloating [Urgency] : urgency [Frequency] : frequency [Pelvic pain] : pelvic pain [History of Anemia] : history of anemia [Night Sweats] : no night sweats [Recent Weight Gain (___ Lbs)] : no recent weight gain [Recent Weight Loss (___ Lbs)] : no recent weight loss [Chest Pain] : no chest pain [Palpitations] : no palpitations [Vomiting] : no vomiting [Nausea] : no nausea [Incontinence] : no incontinence [Dysuria] : no dysuria

## 2021-12-06 ENCOUNTER — TRANSCRIPTION ENCOUNTER (OUTPATIENT)
Age: 46
End: 2021-12-06

## 2021-12-13 DIAGNOSIS — D64.9 ANEMIA, UNSPECIFIED: ICD-10-CM

## 2021-12-13 DIAGNOSIS — Z87.42 PERSONAL HISTORY OF OTHER DISEASES OF THE FEMALE GENITAL TRACT: ICD-10-CM

## 2021-12-13 DIAGNOSIS — Z86.018 PERSONAL HISTORY OF OTHER BENIGN NEOPLASM: ICD-10-CM

## 2021-12-20 ENCOUNTER — OUTPATIENT (OUTPATIENT)
Dept: OUTPATIENT SERVICES | Facility: HOSPITAL | Age: 46
LOS: 1 days | End: 2021-12-20
Payer: MEDICAID

## 2021-12-20 ENCOUNTER — APPOINTMENT (OUTPATIENT)
Dept: MRI IMAGING | Facility: HOSPITAL | Age: 46
End: 2021-12-20
Payer: MEDICAID

## 2021-12-20 DIAGNOSIS — Z86.018 PERSONAL HISTORY OF OTHER BENIGN NEOPLASM: ICD-10-CM

## 2021-12-20 PROCEDURE — 72197 MRI PELVIS W/O & W/DYE: CPT | Mod: 26

## 2021-12-20 PROCEDURE — 72197 MRI PELVIS W/O & W/DYE: CPT

## 2022-01-04 ENCOUNTER — APPOINTMENT (OUTPATIENT)
Dept: MRI IMAGING | Facility: IMAGING CENTER | Age: 47
End: 2022-01-04

## 2022-01-05 ENCOUNTER — TRANSCRIPTION ENCOUNTER (OUTPATIENT)
Age: 47
End: 2022-01-05

## 2022-01-24 ENCOUNTER — TRANSCRIPTION ENCOUNTER (OUTPATIENT)
Age: 47
End: 2022-01-24

## 2022-01-25 ENCOUNTER — TRANSCRIPTION ENCOUNTER (OUTPATIENT)
Age: 47
End: 2022-01-25

## 2022-01-27 ENCOUNTER — APPOINTMENT (OUTPATIENT)
Dept: OBGYN | Facility: CLINIC | Age: 47
End: 2022-01-27

## 2022-01-27 ENCOUNTER — TRANSCRIPTION ENCOUNTER (OUTPATIENT)
Age: 47
End: 2022-01-27

## 2022-02-08 ENCOUNTER — APPOINTMENT (OUTPATIENT)
Dept: OBGYN | Facility: CLINIC | Age: 47
End: 2022-02-08
Payer: MEDICAID

## 2022-02-08 ENCOUNTER — APPOINTMENT (OUTPATIENT)
Dept: ANTEPARTUM | Facility: CLINIC | Age: 47
End: 2022-02-08

## 2022-02-08 VITALS
SYSTOLIC BLOOD PRESSURE: 136 MMHG | HEIGHT: 69 IN | BODY MASS INDEX: 24.59 KG/M2 | WEIGHT: 166 LBS | DIASTOLIC BLOOD PRESSURE: 97 MMHG

## 2022-02-08 PROCEDURE — 99203 OFFICE O/P NEW LOW 30 MIN: CPT

## 2022-02-13 NOTE — PHYSICAL EXAM
[Appropriately responsive] : appropriately responsive [Alert] : alert [No Acute Distress] : no acute distress [Soft] : soft [Non-distended] : non-distended [No HSM] : No HSM [No Lesions] : no lesions [No Mass] : no mass [Oriented x3] : oriented x3

## 2022-02-13 NOTE — PLAN
[FreeTextEntry1] : 45 yo female presents today s/p fibroids on MRi:\par - patient to f/u with Dr. Rebolledo or Dr. Louis for surgical consult\par

## 2022-02-13 NOTE — HISTORY OF PRESENT ILLNESS
[Patient reported mammogram was normal] : Patient reported mammogram was normal [Y] : Positive pregnancy history [Previously active] : previously active [Men] : men [Vaginal] : vaginal [No] : No [FreeTextEntry1] : 47 yo female presents today stating that she  had an MRI done on 12/20/21 which revealed she has fibroids (MRI reports in all scripts.) Patient did not return to the doctor who sent her from the MRI (Dr. Vickie Melendez) because she could not get an appointment for another 2 months and she desires surgery. Patient reports an LMP of 2/3/22, and states that cycles are extremely heavy and has left sided abdominal pain from the fibroids. Patient discussed surgical options with previous doctor, and would like a UFE procdure done. [Mammogramdate] : 2021 [LMPDate] : 2/3/22 [PGHxTotal] : 4 [[facility] : 2 [PGHxPremature] : 0 [PGHxAbortions] : 1 [[facility]Living] : 2 [PGHxABInduced] : 0 [PGHxABSpont] : 1 [PGHxEctopic] : 0

## 2022-03-04 ENCOUNTER — APPOINTMENT (OUTPATIENT)
Dept: OBGYN | Facility: CLINIC | Age: 47
End: 2022-03-04
Payer: MEDICAID

## 2022-03-04 VITALS
HEIGHT: 69 IN | WEIGHT: 164 LBS | BODY MASS INDEX: 24.29 KG/M2 | SYSTOLIC BLOOD PRESSURE: 133 MMHG | DIASTOLIC BLOOD PRESSURE: 84 MMHG

## 2022-03-04 PROCEDURE — 76830 TRANSVAGINAL US NON-OB: CPT

## 2022-03-04 PROCEDURE — 99214 OFFICE O/P EST MOD 30 MIN: CPT

## 2022-03-04 PROCEDURE — 36415 COLL VENOUS BLD VENIPUNCTURE: CPT

## 2022-03-04 PROCEDURE — 76857 US EXAM PELVIC LIMITED: CPT

## 2022-03-04 NOTE — PLAN
[FreeTextEntry1] : 45 y/o F with uterine myomas \par -Pelvic MRI reviewed. Pelvic sonogram performed today in order to evaluate left ovary. Left ovary normal appearing with very small density. No free fluid. Will check tumor markers to rule out cancer\par - Medical options discussed including oral contraceptive pill, LNG IUD, Depo Provera and TXA.  Surgical options discussed include UAE, endometrial ablation, myomectomy and hysterectomy. Risks and benefits of each option including potential effects on bleeding and pain reviewed\par -Patient opting for UAE. EMB already performed by other provider. Referral given for Dr. Harris of [facility]\par -f/u for annual exam\par

## 2022-03-04 NOTE — HISTORY OF PRESENT ILLNESS
[FreeTextEntry1] : Patient is a 45 y/o  presenting for consultation for uterine myomas. She had an MRI performed on 21 which showed a large fibroid uterus with multiple fibroids, mostly intramural. Of note, the MRI also showed an enhancing left ovarian lesion, can not rule out malignancy.  Presently she is complaining of AUB, pain and pressure symptoms. She also reports early satiety and inability to eat with a 5lb weight loss. She was counseled previously by another GYN and is very interested in uterine artery embolization. She strongly wishes to avoid a hysterectomy. Today she presents for further evaluation and possible intervention.\par

## 2022-03-04 NOTE — REVIEW OF SYSTEMS
[Abdominal Pain] : abdominal pain [Nausea] : nausea [Frequency] : frequency [Abn Vaginal bleeding] : abnormal vaginal bleeding [Pelvic pain] : pelvic pain [Negative] : Heme/Lymph

## 2022-03-04 NOTE — PHYSICAL EXAM
[Appropriately responsive] : appropriately responsive [Alert] : alert [No Acute Distress] : no acute distress [Soft] : soft [Non-distended] : non-distended [No HSM] : No HSM [No Lesions] : no lesions [No Mass] : no mass [Oriented x3] : oriented x3 [FreeTextEntry3] : supple [FreeTextEntry5] : Normal respiratory effort [FreeTextEntry7] : Mildly tender to palpation in lower abdomen/pelvis

## 2022-03-08 ENCOUNTER — APPOINTMENT (OUTPATIENT)
Dept: INTERVENTIONAL RADIOLOGY/VASCULAR | Facility: CLINIC | Age: 47
End: 2022-03-08

## 2022-03-14 ENCOUNTER — APPOINTMENT (OUTPATIENT)
Dept: OBGYN | Facility: CLINIC | Age: 47
End: 2022-03-14
Payer: MEDICAID

## 2022-03-14 LAB
CANCER AG125 SERPL-ACNC: 105 U/ML
CANCER AG19-9 SERPL-ACNC: 9 U/ML
CEA SERPL-MCNC: 0.7 NG/ML

## 2022-03-14 PROCEDURE — 99213 OFFICE O/P EST LOW 20 MIN: CPT | Mod: 95

## 2022-03-20 PROBLEM — N83.8 OVARIAN MASS, LEFT: Status: ACTIVE | Noted: 2022-03-07

## 2022-03-22 ENCOUNTER — NON-APPOINTMENT (OUTPATIENT)
Age: 47
End: 2022-03-22

## 2022-03-22 ENCOUNTER — APPOINTMENT (OUTPATIENT)
Dept: INTERVENTIONAL RADIOLOGY/VASCULAR | Facility: CLINIC | Age: 47
End: 2022-03-22

## 2022-03-22 ENCOUNTER — EMERGENCY (EMERGENCY)
Facility: HOSPITAL | Age: 47
LOS: 1 days | Discharge: HOME | End: 2022-03-22
Attending: EMERGENCY MEDICINE | Admitting: EMERGENCY MEDICINE
Payer: MEDICAID

## 2022-03-22 VITALS
HEART RATE: 77 BPM | DIASTOLIC BLOOD PRESSURE: 67 MMHG | TEMPERATURE: 98 F | OXYGEN SATURATION: 100 % | RESPIRATION RATE: 18 BRPM | SYSTOLIC BLOOD PRESSURE: 124 MMHG

## 2022-03-22 VITALS
DIASTOLIC BLOOD PRESSURE: 82 MMHG | SYSTOLIC BLOOD PRESSURE: 147 MMHG | TEMPERATURE: 98 F | HEIGHT: 69 IN | OXYGEN SATURATION: 100 % | HEART RATE: 90 BPM | RESPIRATION RATE: 18 BRPM

## 2022-03-22 DIAGNOSIS — N92.0 EXCESSIVE AND FREQUENT MENSTRUATION WITH REGULAR CYCLE: ICD-10-CM

## 2022-03-22 DIAGNOSIS — N83.8 OTHER NONINFLAMMATORY DISORDERS OF OVARY, FALLOPIAN TUBE AND BROAD LIGAMENT: ICD-10-CM

## 2022-03-22 LAB
ALBUMIN SERPL ELPH-MCNC: 4.3 G/DL — SIGNIFICANT CHANGE UP (ref 3.3–5)
ALP SERPL-CCNC: 54 U/L — SIGNIFICANT CHANGE UP (ref 40–120)
ALT FLD-CCNC: 10 U/L — SIGNIFICANT CHANGE UP (ref 4–33)
ANION GAP SERPL CALC-SCNC: 12 MMOL/L — SIGNIFICANT CHANGE UP (ref 7–14)
APPEARANCE UR: ABNORMAL
AST SERPL-CCNC: 14 U/L — SIGNIFICANT CHANGE UP (ref 4–32)
BACTERIA # UR AUTO: ABNORMAL
BASOPHILS # BLD AUTO: 0.04 K/UL — SIGNIFICANT CHANGE UP (ref 0–0.2)
BASOPHILS NFR BLD AUTO: 0.6 % — SIGNIFICANT CHANGE UP (ref 0–2)
BILIRUB SERPL-MCNC: 0.3 MG/DL — SIGNIFICANT CHANGE UP (ref 0.2–1.2)
BILIRUB UR-MCNC: NEGATIVE — SIGNIFICANT CHANGE UP
BLD GP AB SCN SERPL QL: NEGATIVE — SIGNIFICANT CHANGE UP
BUN SERPL-MCNC: 12 MG/DL — SIGNIFICANT CHANGE UP (ref 7–23)
CALCIUM SERPL-MCNC: 10.4 MG/DL — SIGNIFICANT CHANGE UP (ref 8.4–10.5)
CHLORIDE SERPL-SCNC: 103 MMOL/L — SIGNIFICANT CHANGE UP (ref 98–107)
CO2 SERPL-SCNC: 22 MMOL/L — SIGNIFICANT CHANGE UP (ref 22–31)
COLOR SPEC: ABNORMAL
CREAT SERPL-MCNC: 0.57 MG/DL — SIGNIFICANT CHANGE UP (ref 0.5–1.3)
DIFF PNL FLD: ABNORMAL
EGFR: 113 ML/MIN/1.73M2 — SIGNIFICANT CHANGE UP
EOSINOPHIL # BLD AUTO: 0.08 K/UL — SIGNIFICANT CHANGE UP (ref 0–0.5)
EOSINOPHIL NFR BLD AUTO: 1.3 % — SIGNIFICANT CHANGE UP (ref 0–6)
GLUCOSE SERPL-MCNC: 92 MG/DL — SIGNIFICANT CHANGE UP (ref 70–99)
GLUCOSE UR QL: NEGATIVE — SIGNIFICANT CHANGE UP
HCT VFR BLD CALC: 35.1 % — SIGNIFICANT CHANGE UP (ref 34.5–45)
HGB BLD-MCNC: 10.5 G/DL — LOW (ref 11.5–15.5)
IANC: 4.06 K/UL — SIGNIFICANT CHANGE UP (ref 1.5–8.5)
IMM GRANULOCYTES NFR BLD AUTO: 0.3 % — SIGNIFICANT CHANGE UP (ref 0–1.5)
KETONES UR-MCNC: ABNORMAL
LEUKOCYTE ESTERASE UR-ACNC: ABNORMAL
LYMPHOCYTES # BLD AUTO: 1.45 K/UL — SIGNIFICANT CHANGE UP (ref 1–3.3)
LYMPHOCYTES # BLD AUTO: 23.3 % — SIGNIFICANT CHANGE UP (ref 13–44)
MCHC RBC-ENTMCNC: 24.6 PG — LOW (ref 27–34)
MCHC RBC-ENTMCNC: 29.9 GM/DL — LOW (ref 32–36)
MCV RBC AUTO: 82.4 FL — SIGNIFICANT CHANGE UP (ref 80–100)
MONOCYTES # BLD AUTO: 0.58 K/UL — SIGNIFICANT CHANGE UP (ref 0–0.9)
MONOCYTES NFR BLD AUTO: 9.3 % — SIGNIFICANT CHANGE UP (ref 2–14)
NEUTROPHILS # BLD AUTO: 4.06 K/UL — SIGNIFICANT CHANGE UP (ref 1.8–7.4)
NEUTROPHILS NFR BLD AUTO: 65.2 % — SIGNIFICANT CHANGE UP (ref 43–77)
NITRITE UR-MCNC: NEGATIVE — SIGNIFICANT CHANGE UP
NRBC # BLD: 0 /100 WBCS — SIGNIFICANT CHANGE UP
NRBC # FLD: 0 K/UL — SIGNIFICANT CHANGE UP
PH UR: 5.5 — SIGNIFICANT CHANGE UP (ref 5–8)
PLATELET # BLD AUTO: 218 K/UL — SIGNIFICANT CHANGE UP (ref 150–400)
POTASSIUM SERPL-MCNC: 3.7 MMOL/L — SIGNIFICANT CHANGE UP (ref 3.5–5.3)
POTASSIUM SERPL-SCNC: 3.7 MMOL/L — SIGNIFICANT CHANGE UP (ref 3.5–5.3)
PROT SERPL-MCNC: 7.5 G/DL — SIGNIFICANT CHANGE UP (ref 6–8.3)
PROT UR-MCNC: ABNORMAL
RBC # BLD: 4.26 M/UL — SIGNIFICANT CHANGE UP (ref 3.8–5.2)
RBC # FLD: 16.7 % — HIGH (ref 10.3–14.5)
RBC CASTS # UR COMP ASSIST: SIGNIFICANT CHANGE UP /HPF (ref 0–4)
RH IG SCN BLD-IMP: POSITIVE — SIGNIFICANT CHANGE UP
SODIUM SERPL-SCNC: 137 MMOL/L — SIGNIFICANT CHANGE UP (ref 135–145)
SP GR SPEC: 1.02 — SIGNIFICANT CHANGE UP (ref 1–1.05)
UROBILINOGEN FLD QL: SIGNIFICANT CHANGE UP
WBC # BLD: 6.23 K/UL — SIGNIFICANT CHANGE UP (ref 3.8–10.5)
WBC # FLD AUTO: 6.23 K/UL — SIGNIFICANT CHANGE UP (ref 3.8–10.5)
WBC UR QL: SIGNIFICANT CHANGE UP /HPF (ref 0–5)

## 2022-03-22 PROCEDURE — 71046 X-RAY EXAM CHEST 2 VIEWS: CPT | Mod: 26

## 2022-03-22 PROCEDURE — 71275 CT ANGIOGRAPHY CHEST: CPT | Mod: 26,MA

## 2022-03-22 PROCEDURE — 99243 OFF/OP CNSLTJ NEW/EST LOW 30: CPT | Mod: 1L,95

## 2022-03-22 PROCEDURE — 99285 EMERGENCY DEPT VISIT HI MDM: CPT

## 2022-03-22 RX ORDER — MELATONIN 3 MG
25 MCG TABLET ORAL
Refills: 0 | Status: DISCONTINUED | COMMUNITY
End: 2022-03-22

## 2022-03-22 RX ORDER — SODIUM CHLORIDE 9 MG/ML
1000 INJECTION INTRAMUSCULAR; INTRAVENOUS; SUBCUTANEOUS ONCE
Refills: 0 | Status: COMPLETED | OUTPATIENT
Start: 2022-03-22 | End: 2022-03-22

## 2022-03-22 RX ORDER — METHYLPREDNISOLONE 4 MG/1
4 TABLET ORAL
Qty: 1 | Refills: 0 | Status: DISCONTINUED | COMMUNITY
Start: 2021-01-05 | End: 2022-03-22

## 2022-03-22 RX ORDER — MISOPROSTOL 200 UG/1
200 TABLET ORAL
Qty: 8 | Refills: 0 | Status: DISCONTINUED | COMMUNITY
Start: 2020-09-22 | End: 2022-03-22

## 2022-03-22 RX ORDER — RIBOFLAVIN (VITAMIN B2) 400 MG
400 TABLET ORAL
Qty: 30 | Refills: 5 | Status: DISCONTINUED | COMMUNITY
Start: 2021-12-03 | End: 2022-03-22

## 2022-03-22 RX ORDER — DEXTROMETHORPHAN HYDROBROMIDE AND GUAIFENESIN 10; 100 MG/5ML; MG/5ML
100-10 SOLUTION ORAL
Qty: 210 | Refills: 2 | Status: DISCONTINUED | COMMUNITY
Start: 2020-03-19 | End: 2022-03-22

## 2022-03-22 RX ORDER — ACETAMINOPHEN 500 MG
975 TABLET ORAL ONCE
Refills: 0 | Status: COMPLETED | OUTPATIENT
Start: 2022-03-22 | End: 2022-03-22

## 2022-03-22 RX ADMIN — SODIUM CHLORIDE 1000 MILLILITER(S): 9 INJECTION INTRAMUSCULAR; INTRAVENOUS; SUBCUTANEOUS at 17:11

## 2022-03-22 RX ADMIN — Medication 975 MILLIGRAM(S): at 17:11

## 2022-03-22 NOTE — ED PROVIDER NOTE - ATTENDING CONTRIBUTION TO CARE
47 yo F with PMH of asthma and fibroids presenting with dyspnea, pre-syncope, vaginal bleeding and chest pain. PT notes she has heavy periods. Her period started a few days ago. She states in last day she's filled 3-4 large pads. Pt states she has chest pain and dyspnea with all her periods, but this time she's also experiencing dizziness and difficulty breathing when she tries to stand up. She notes her dyspnea is worse than usual. She has not taken any pain medications for fear of worsening her bleeding. PT states she has been diagnosed with ovarian cancer. She will need an oophorectomy but it hasn't been scheduled yet. PT denies fever, cough, sore throat, vomiting, diarrhea.

## 2022-03-22 NOTE — ED ADULT TRIAGE NOTE - CHIEF COMPLAINT QUOTE
Pt c/o SOB, dizziness, and CP since Saturday.  Instructed to go to the ER by GYN.  Hx anemia.  Menstruating currently.

## 2022-03-22 NOTE — ED PROVIDER NOTE - PATIENT PORTAL LINK FT
You can access the FollowMyHealth Patient Portal offered by [facility] by registering at the following website: [email or web address]. By joining [facility]’s FollowMyHealth portal, you will also be able to view your health information using other applications (apps) compatible with our system.

## 2022-03-22 NOTE — ED PROVIDER NOTE - OBJECTIVE STATEMENT
45 yo F with PMH of asthma and fibroids presenting with dyspnea, pre-syncope, vaginal bleeding and chest pain. PT notes she has heavy periods. Her period started a few days ago. She states she feels 45 yo F with PMH of asthma and fibroids presenting with dyspnea, pre-syncope, vaginal bleeding and chest pain. PT notes she has heavy periods. Her period started a few days ago. She states in last day she's filled 3-4 large pads. Pt states she has chest pain and dyspnea with all her periods, but this time she's also experiencing dizziness and difficulty breathing when she tries to stand up. She notes her dyspnea is worse than usual. She has not taken any pain medications for fear of worsening her bleeding. PT states she has been diagnosed with ovarian cancer. She will need an oophorectomy but it hasn't been scheduled yet. PT denies fever, cough, sore throat, vomiting, diarrhea.

## 2022-03-22 NOTE — ED PROVIDER NOTE - NS ED ATTENDING STATEMENT MOD
Attending with This was a shared visit with the CELIA. I reviewed and verified the documentation and independently performed the documented:

## 2022-03-22 NOTE — ED PROVIDER NOTE - PHYSICAL EXAMINATION
General: WN/WD NAD  Head: Atraumatic, normocephalic  Eyes: EOM grossly in tact, no scleral icterus, no discharge, slight mucosal pallor  ENT: moist mucous membranes  Neurology: A&Ox 3, nonfocal, HERNANDEZ x 4  Respiratory: CTAB, no wheezing, increased respiratory effort, tachypnea  CV: RRR, good s1/s2, no S3, Extremities warm and well perfused  Abdominal: Soft, non-distended, minimal TTP, no focal areas of tenderness  Extremities: No edema, no deformities  Skin: warm and dry. No rashes

## 2022-03-22 NOTE — ED ADULT NURSE NOTE - OBJECTIVE STATEMENT
Pt arriving to intake room 9 A&XO4 ambulatory c/o CP, SOB. Hx fibroids, anemia. Pt endorsing vaginal bleeding. Respirations even and unlabored. 20g IV placed in RAC. Labs drawn and sent. Medicated as per EMAR.

## 2022-03-22 NOTE — ED PROVIDER NOTE - CLINICAL SUMMARY MEDICAL DECISION MAKING FREE TEXT BOX
47 yo F with PMH of asthma and fibroids presenting with dyspnea, pre-syncope, vaginal bleeding and chest pain. This is concerning for anemia vs. PE vs. covid. PT is unwell appearing, known heavy periods, filling multiple diapers a day. PT also states her dyspnea is worse than usual. Given this some concern for anemia. will order cbc, cmp, TS, cxr, fluids, pain meds. PT has risk factors for PE so if pt is not anemic, would work up further with CTA chest.

## 2022-03-22 NOTE — ED PROVIDER NOTE - PROGRESS NOTE DETAILS
edvin fontaine: pt CTA shows no Pe, however showed other concerning findings including possible neoplasm, pt made aware, will have her f/u with her PMD ASAP

## 2022-03-22 NOTE — ED ADULT TRIAGE NOTE - HAVE YOU HAD A FIRST COVID-19 BOOSTER?
Surgery Canceled:    Patient was seen in the [facility] area.  He's had right knee pain for years but has had 8 days of increasing discomfort in the right lower extremity.  He denies fever, sweats, or chills.  On exam, he had a mild to moderate swelling of the right lower extremity with some tenderness in the posterior medial calf.  There is also some mild erythema of the distal medial ankle.  This is not severe.  There is no fluctuance.  There is some blanching erythema.  I suspect that he could have an acute calf vein DVT or superficial thrombophlebitis.  I ordered a venous duplex scan and this was negative.    I went talked to the patient about what's going on.  He denied any trauma.  I ultimately had a long conversation with him, his wife, and one of his daughters who is a nurse practitioner.  Because of the concern for potential cellulitis, we elected to cancel his case and give him a 7 day course of antibiotics.  There is some possibility this is just musculoskeletal too  but with the possible infection risk I don't want to implant any grafts.  The understand and agree.  He is allergic to penicillin so I've given him an electronic prescription for Bactrim for 7 days.  He's going to call my office to see if he is getting better or not.  If he is not getting better, then I may order a CT or MRI of the lower leg.          
No

## 2022-03-24 ENCOUNTER — APPOINTMENT (OUTPATIENT)
Dept: INTERNAL MEDICINE | Facility: CLINIC | Age: 47
End: 2022-03-24
Payer: MEDICAID

## 2022-03-24 VITALS
BODY MASS INDEX: 24.14 KG/M2 | DIASTOLIC BLOOD PRESSURE: 79 MMHG | WEIGHT: 163 LBS | HEIGHT: 69 IN | OXYGEN SATURATION: 98 % | RESPIRATION RATE: 18 BRPM | SYSTOLIC BLOOD PRESSURE: 127 MMHG | HEART RATE: 64 BPM

## 2022-03-24 DIAGNOSIS — E55.9 VITAMIN D DEFICIENCY, UNSPECIFIED: ICD-10-CM

## 2022-03-24 LAB
CULTURE RESULTS: SIGNIFICANT CHANGE UP
SPECIMEN SOURCE: SIGNIFICANT CHANGE UP

## 2022-03-24 PROCEDURE — 99214 OFFICE O/P EST MOD 30 MIN: CPT

## 2022-03-24 NOTE — HEALTH RISK ASSESSMENT
[No falls in past year] : Patient reported no falls in the past year [] : No [de-identified] : 05/20 [de-identified] : NEURO

## 2022-03-24 NOTE — HISTORY OF PRESENT ILLNESS
[de-identified] : 46 year old  female patient with history of stable Hypercholesterolemia, Iron Deficiency Anemia, Vitamin D Deficiency, history as stated, presented for follow up examination after ER discharge. Patient is compliant with all medications. Denies chest pain at this time. ROS as stated.\par

## 2022-03-24 NOTE — ASSESSMENT
[FreeTextEntry1] : 46 year old female found to have stable Asthma, Iron Deficiency Anemia, Vitamin D Deficiency, Lumbar Radiculopathy, Migraine, with the current prescription regimen as recommended, diet and life style modifications, as counseled. Prior results reviewed, interpreted and discussed with the patient during today's examination, as appropriate. Follow up, treatment plan and tests, as ordered.\par \par Patient was recently evaluated by OBGYN , findings and recommendations reviewed with the patient during today's examination. R/O Ovarian Cancer, Fibroid, as counseled. Possible Sx intervention as appropriate, in near future, as per OBGYN.\par \par Patient was recently evaluated in ER, findings and recommendations reviewed with the patient during today's examination. GI / NEURO F/Us, as directed.\par \par ER study:\par  ACC: [id number] EXAM:  CT ANGIO CHEST PULM ART WAWIC                      \par \par PROCEDURE DATE:  03/22/2022  \par IMPRESSION:\par No pulmonary embolism or other acute intrathoracic pathology.\par \par Subcentimeter hypodensity in the pancreatic body, possibly a pseudocyst \par versus intraductal papillary mucinous neoplasm. Recommend further workup \par with nonemergent MRI abdomen.\par \par Indeterminant sclerotic focus in the vertebral body of T7. Nonemergent \par lumbar spine with IV contrast should be considered for further evaluation.\par \par \par Total time spent :  minutes\par Including:\par Preparation prior to visit - Reviewing prior record, results of tests and Consultation Reports as applicable\par Conducting an appropriate H & P during today's encounter\par Appropriate orders for tests, medications and procedures, as applicable\par Counseling patient \par Note completion\par

## 2022-03-24 NOTE — HISTORY OF PRESENT ILLNESS
[de-identified] : 46 year old  female patient with history of stable Hypercholesterolemia, Iron Deficiency Anemia, Vitamin D Deficiency, history as stated, presented for follow up examination after ER discharge. Patient is compliant with all medications. Denies chest pain at this time. ROS as stated.\par

## 2022-03-24 NOTE — HEALTH RISK ASSESSMENT
[No falls in past year] : Patient reported no falls in the past year [] : No [de-identified] : 05/20 [de-identified] : NEURO

## 2022-03-24 NOTE — ASSESSMENT
[FreeTextEntry1] : 46 year old female found to have stable Asthma, Iron Deficiency Anemia, Vitamin D Deficiency, Lumbar Radiculopathy, Migraine, with the current prescription regimen as recommended, diet and life style modifications, as counseled. Prior results reviewed, interpreted and discussed with the patient during today's examination, as appropriate. Follow up, treatment plan and tests, as ordered.\par \par Patient was recently evaluated by OBGYN , findings and recommendations reviewed with the patient during today's examination. R/O Ovarian Cancer, Fibroid, as counseled. Possible Sx intervention as appropriate, in near future, as per OBGYN.\par \par Patient was recently evaluated in ER, findings and recommendations reviewed with the patient during today's examination. GI / NEURO F/Us, as directed.\par \par ER study:\par  ACC: [id number] EXAM:  CT ANGIO CHEST PULM ART WAWIC                      \par \par PROCEDURE DATE:  03/22/2022  \par IMPRESSION:\par No pulmonary embolism or other acute intrathoracic pathology.\par \par Subcentimeter hypodensity in the pancreatic body, possibly a pseudocyst \par versus intraductal papillary mucinous neoplasm. Recommend further workup \par with nonemergent MRI abdomen.\par \par Indeterminant sclerotic focus in the vertebral body of T7. Nonemergent \par lumbar spine with IV contrast should be considered for further evaluation.\par \par \par Total time spent :  minutes\par Including:\par Preparation prior to visit - Reviewing prior record, results of tests and Consultation Reports as applicable\par Conducting an appropriate H & P during today's encounter\par Appropriate orders for tests, medications and procedures, as applicable\par Counseling patient \par Note completion\par

## 2022-03-25 ENCOUNTER — APPOINTMENT (OUTPATIENT)
Dept: OBGYN | Facility: CLINIC | Age: 47
End: 2022-03-25
Payer: MEDICAID

## 2022-03-25 DIAGNOSIS — R97.1 ELEVATED CANCER ANTIGEN 125 [CA 125]: ICD-10-CM

## 2022-03-25 PROCEDURE — 99213 OFFICE O/P EST LOW 20 MIN: CPT | Mod: 95

## 2022-03-25 NOTE — PLAN
[FreeTextEntry1] : 45 y/o F with uterine myomas \par -MRI reviewed\par - Medical options discussed including oral contraceptive pill, LNG IUD, Depo Provera and TXA.  Surgical options discussed include UAE, endometrial ablation, myomectomy and hysterectomy. Risks and benefits of each option including potential effects on bleeding and pain reviewed. Pt already seen by [facility] and is not a candidate for UAE.\par -Patient opting for hysterectomy. Discussed risks including bleeding, infection, damage to surrounding visceral/vascular structures. Also discussed possible conversion to exploratory laparotomy. Will precertify and schedule for TLH-BS, left oophorectomy. Discussed need for EMB prior to procedure\par -f/u for EMB\par

## 2022-03-25 NOTE — HISTORY OF PRESENT ILLNESS
[FreeTextEntry1] : Patient is a 45 y/o  presenting for telehealth consultation for uterine myomas. Presently she is complaining of AUB/pain/pressure symptoms. She was recently seen in the ED due to heavy vaginal bleeding with cardiorespiratory symptoms of anemia. Her H/H at the time was 10.5/35.1. She was seen by [facility] for possible UFE but was not a candidate. At this time, she is very uncomfortable and would like definitive management. \par \par The patient was located at home for this encounter.\par Physician location - [address] [place], [place] [address]

## 2022-04-01 ENCOUNTER — APPOINTMENT (OUTPATIENT)
Dept: OBGYN | Facility: CLINIC | Age: 47
End: 2022-04-01
Payer: MEDICAID

## 2022-04-01 VITALS
HEIGHT: 69 IN | SYSTOLIC BLOOD PRESSURE: 126 MMHG | BODY MASS INDEX: 24.14 KG/M2 | DIASTOLIC BLOOD PRESSURE: 78 MMHG | WEIGHT: 163 LBS

## 2022-04-01 PROCEDURE — 58100 BIOPSY OF UTERUS LINING: CPT

## 2022-04-01 NOTE — PROCEDURE
[Endometrial Biopsy] : Endometrial biopsy [Time out performed] : Pre-procedure time out performed.  Patient's name, date of birth and procedure confirmed. [Consent Obtained] : Consent obtained [Pre-op Evaluation] : Pre-op evaluation [Irregular Bleeding] : irregular uterine bleeding [Risks] : risks [Benefits] : benefits [Alternatives] : alternatives [Patient] : patient [Infection] : infection [Bleeding] : bleeding [Allergic Reaction] : allergic reaction [Uterine Perforation] : uterine perforation [Pain] : pain [Negative] : negative pregnancy test [Betadine] : Betadine [None] : none [Tenaculum] : Tenaculum [Easy Passage] : Easy passage [Sounded to ___ cm] : sounded to [unfilled] ~Ucm [Moderate] : moderate [Sent to Pathology] : placed in buffered formalin and sent for pathology [US Guided] : Ultrasound was used to guide the endometrial biopsy [Tolerated Well] : Patient tolerated the procedure well [No Complications] : No complications [LMPDate] : 3/23/22 [de-identified] : allis

## 2022-04-01 NOTE — PLAN
[FreeTextEntry1] : 47 y/o F presenting for EMB for AUB, planning for hysterectomy\par -f/u biopsy results\par -NSAIDS and Tylenol PRN for discomfort\par -Patient advised to call the office with fevers, abdominal discharge and heavy vaginal bleeding\par

## 2022-04-04 LAB
CANDIDA VAG CYTO: NOT DETECTED
G VAGINALIS+PREV SP MTYP VAG QL MICRO: DETECTED
T VAGINALIS VAG QL WET PREP: NOT DETECTED

## 2022-04-12 LAB — CORE LAB BIOPSY: NORMAL

## 2022-04-13 ENCOUNTER — APPOINTMENT (OUTPATIENT)
Dept: NEUROLOGY | Facility: CLINIC | Age: 47
End: 2022-04-13

## 2022-04-14 ENCOUNTER — APPOINTMENT (OUTPATIENT)
Dept: GASTROENTEROLOGY | Facility: CLINIC | Age: 47
End: 2022-04-14
Payer: MEDICAID

## 2022-04-14 VITALS
SYSTOLIC BLOOD PRESSURE: 119 MMHG | OXYGEN SATURATION: 99 % | BODY MASS INDEX: 23.7 KG/M2 | WEIGHT: 160 LBS | HEIGHT: 69 IN | TEMPERATURE: 98.6 F | DIASTOLIC BLOOD PRESSURE: 83 MMHG | HEART RATE: 80 BPM

## 2022-04-14 DIAGNOSIS — K86.9 DISEASE OF PANCREAS, UNSPECIFIED: ICD-10-CM

## 2022-04-14 PROCEDURE — 99203 OFFICE O/P NEW LOW 30 MIN: CPT

## 2022-04-14 NOTE — PHYSICAL EXAM
[General Appearance - Alert] : alert [General Appearance - In No Acute Distress] : in no acute distress [Bowel Sounds] : normal bowel sounds [Abdomen Soft] : soft [Abdomen Tenderness] : non-tender [Abdomen Mass (___ Cm)] : no abdominal mass palpated [Abnormal Walk] : normal gait [Nail Clubbing] : no clubbing  or cyanosis of the fingernails [Musculoskeletal - Swelling] : no joint swelling seen [Motor Tone] : muscle strength and tone were normal [Skin Color & Pigmentation] : normal skin color and pigmentation [Skin Turgor] : normal skin turgor [] : no rash [Deep Tendon Reflexes (DTR)] : deep tendon reflexes were 2+ and symmetric [Sensation] : the sensory exam was normal to light touch and pinprick [No Focal Deficits] : no focal deficits [Oriented To Time, Place, And Person] : oriented to person, place, and time [Impaired Insight] : insight and judgment were intact [Affect] : the affect was normal

## 2022-04-18 NOTE — HISTORY OF PRESENT ILLNESS
[de-identified] : This is a 46 year old female here for an evaluation of pancreatic lesion. PMH of uterine fibroid, EMILY, asthma, right ankle surgery with pins. Denies a family history of colon CA, gastric CA, high risk polyps, Inflammatory and autoimmune disease. Patient was in the ED recent for left chest pain. CT angio showed Subcentimeter hypodensity in the pancreatic body, possibly a pseudocyst \par versus intraductal papillary mucinous neoplasm.  and CEA normal. Ca125 105. Patient denies difficulty swallowing or reflux. Endorses left rib pain. Stool caliber normal. No blood or dark tarry stools.  \par Colonoscopy 2018 internal hemorrhoids\par EGD 2018 hiatal hernia and gastritis.

## 2022-04-18 NOTE — ASSESSMENT
[FreeTextEntry1] : This is a 46 year old female here for an evaluation of pancreatic lesion. PMH of uterine fibroid, EMILY, asthma, right ankle surgery with pins. Denies a family history of colon CA, gastric CA, high risk polyps, Inflammatory and autoimmune disease. Patient was in the ED recent for left chest pain. CT angio showed Subcentimeter hypodensity in the pancreatic body, possibly a pseudocyst versus intraductal papillary mucinous neoplasm.  and CEA normal. Ca125 105. Patient denies difficulty swallowing or reflux. Endorses left rib pain. Stool caliber normal. No blood or dark tarry stools.  Some constipation since she is on iron BID. \par Colonoscopy 2018 internal hemorrhoids\par EGD 2018 hiatal hernia and gastritis.\par \par Her rib pain might be costochondritis since it is reproducible on palpitation \par \par My plan\par -MiraLAX PRN\par -MRI with pancreatic protocol \par -f/u in 3 weeks \par -if MRI is abnormal will refer to Dr. Luis for possible EUS
Spontaneous, unlabored and symmetrical

## 2022-04-23 ENCOUNTER — INPATIENT (INPATIENT)
Facility: HOSPITAL | Age: 47
LOS: 1 days | Discharge: HOME | DRG: 760 | End: 2022-04-25
Attending: STUDENT IN AN ORGANIZED HEALTH CARE EDUCATION/TRAINING PROGRAM | Admitting: STUDENT IN AN ORGANIZED HEALTH CARE EDUCATION/TRAINING PROGRAM
Payer: MEDICAID

## 2022-04-23 VITALS
SYSTOLIC BLOOD PRESSURE: 136 MMHG | DIASTOLIC BLOOD PRESSURE: 77 MMHG | RESPIRATION RATE: 18 BRPM | WEIGHT: 160.06 LBS | HEIGHT: 69 IN | HEART RATE: 100 BPM | TEMPERATURE: 100 F | OXYGEN SATURATION: 98 %

## 2022-04-23 DIAGNOSIS — R50.9 FEVER, UNSPECIFIED: ICD-10-CM

## 2022-04-23 DIAGNOSIS — D25.9 LEIOMYOMA OF UTERUS, UNSPECIFIED: ICD-10-CM

## 2022-04-23 DIAGNOSIS — Z98.890 OTHER SPECIFIED POSTPROCEDURAL STATES: Chronic | ICD-10-CM

## 2022-04-23 LAB
ALBUMIN SERPL ELPH-MCNC: 3.1 G/DL — LOW (ref 3.5–5)
ALP SERPL-CCNC: 42 U/L — SIGNIFICANT CHANGE UP (ref 40–120)
ALT FLD-CCNC: 25 U/L DA — SIGNIFICANT CHANGE UP (ref 10–60)
ANION GAP SERPL CALC-SCNC: 5 MMOL/L — SIGNIFICANT CHANGE UP (ref 5–17)
APPEARANCE UR: CLEAR — SIGNIFICANT CHANGE UP
AST SERPL-CCNC: 25 U/L — SIGNIFICANT CHANGE UP (ref 10–40)
BASOPHILS # BLD AUTO: 0.04 K/UL — SIGNIFICANT CHANGE UP (ref 0–0.2)
BASOPHILS NFR BLD AUTO: 0.4 % — SIGNIFICANT CHANGE UP (ref 0–2)
BILIRUB SERPL-MCNC: 0.3 MG/DL — SIGNIFICANT CHANGE UP (ref 0.2–1.2)
BILIRUB UR-MCNC: NEGATIVE — SIGNIFICANT CHANGE UP
BUN SERPL-MCNC: 11 MG/DL — SIGNIFICANT CHANGE UP (ref 7–18)
CALCIUM SERPL-MCNC: 9.2 MG/DL — SIGNIFICANT CHANGE UP (ref 8.4–10.5)
CHLORIDE SERPL-SCNC: 110 MMOL/L — HIGH (ref 96–108)
CO2 SERPL-SCNC: 25 MMOL/L — SIGNIFICANT CHANGE UP (ref 22–31)
COLOR SPEC: YELLOW — SIGNIFICANT CHANGE UP
CREAT SERPL-MCNC: 0.64 MG/DL — SIGNIFICANT CHANGE UP (ref 0.5–1.3)
DIFF PNL FLD: NEGATIVE — SIGNIFICANT CHANGE UP
EGFR: 110 ML/MIN/1.73M2 — SIGNIFICANT CHANGE UP
EOSINOPHIL # BLD AUTO: 0.03 K/UL — SIGNIFICANT CHANGE UP (ref 0–0.5)
EOSINOPHIL NFR BLD AUTO: 0.3 % — SIGNIFICANT CHANGE UP (ref 0–6)
GLUCOSE SERPL-MCNC: 92 MG/DL — SIGNIFICANT CHANGE UP (ref 70–99)
GLUCOSE UR QL: NEGATIVE — SIGNIFICANT CHANGE UP
HCG SERPL-ACNC: <1 MIU/ML — SIGNIFICANT CHANGE UP
HCT VFR BLD CALC: 27.9 % — LOW (ref 34.5–45)
HGB BLD-MCNC: 8.7 G/DL — LOW (ref 11.5–15.5)
HIV 1 & 2 AB SERPL IA.RAPID: SIGNIFICANT CHANGE UP
IMM GRANULOCYTES NFR BLD AUTO: 0.1 % — SIGNIFICANT CHANGE UP (ref 0–1.5)
KETONES UR-MCNC: NEGATIVE — SIGNIFICANT CHANGE UP
LACTATE SERPL-SCNC: 0.7 MMOL/L — SIGNIFICANT CHANGE UP (ref 0.7–2)
LEUKOCYTE ESTERASE UR-ACNC: NEGATIVE — SIGNIFICANT CHANGE UP
LIDOCAIN IGE QN: 127 U/L — SIGNIFICANT CHANGE UP (ref 73–393)
LYMPHOCYTES # BLD AUTO: 0.59 K/UL — LOW (ref 1–3.3)
LYMPHOCYTES # BLD AUTO: 6.5 % — LOW (ref 13–44)
MAGNESIUM SERPL-MCNC: 1.7 MG/DL — SIGNIFICANT CHANGE UP (ref 1.6–2.6)
MCHC RBC-ENTMCNC: 24.4 PG — LOW (ref 27–34)
MCHC RBC-ENTMCNC: 31.2 GM/DL — LOW (ref 32–36)
MCV RBC AUTO: 78.4 FL — LOW (ref 80–100)
MONOCYTES # BLD AUTO: 0.86 K/UL — SIGNIFICANT CHANGE UP (ref 0–0.9)
MONOCYTES NFR BLD AUTO: 9.4 % — SIGNIFICANT CHANGE UP (ref 2–14)
NEUTROPHILS # BLD AUTO: 7.58 K/UL — HIGH (ref 1.8–7.4)
NEUTROPHILS NFR BLD AUTO: 83.3 % — HIGH (ref 43–77)
NITRITE UR-MCNC: NEGATIVE — SIGNIFICANT CHANGE UP
NRBC # BLD: 0 /100 WBCS — SIGNIFICANT CHANGE UP (ref 0–0)
PH UR: 7 — SIGNIFICANT CHANGE UP (ref 5–8)
PLATELET # BLD AUTO: 206 K/UL — SIGNIFICANT CHANGE UP (ref 150–400)
POTASSIUM SERPL-MCNC: 3.7 MMOL/L — SIGNIFICANT CHANGE UP (ref 3.5–5.3)
POTASSIUM SERPL-SCNC: 3.7 MMOL/L — SIGNIFICANT CHANGE UP (ref 3.5–5.3)
PROT SERPL-MCNC: 6.5 G/DL — SIGNIFICANT CHANGE UP (ref 6–8.3)
PROT UR-MCNC: NEGATIVE — SIGNIFICANT CHANGE UP
RBC # BLD: 3.56 M/UL — LOW (ref 3.8–5.2)
RBC # FLD: 16.3 % — HIGH (ref 10.3–14.5)
SARS-COV-2 RNA SPEC QL NAA+PROBE: SIGNIFICANT CHANGE UP
SODIUM SERPL-SCNC: 140 MMOL/L — SIGNIFICANT CHANGE UP (ref 135–145)
SP GR SPEC: 1.01 — SIGNIFICANT CHANGE UP (ref 1.01–1.02)
UROBILINOGEN FLD QL: NEGATIVE — SIGNIFICANT CHANGE UP
WBC # BLD: 9.11 K/UL — SIGNIFICANT CHANGE UP (ref 3.8–10.5)
WBC # FLD AUTO: 9.11 K/UL — SIGNIFICANT CHANGE UP (ref 3.8–10.5)

## 2022-04-23 PROCEDURE — 99285 EMERGENCY DEPT VISIT HI MDM: CPT

## 2022-04-23 PROCEDURE — 76830 TRANSVAGINAL US NON-OB: CPT | Mod: 26

## 2022-04-23 PROCEDURE — 93010 ELECTROCARDIOGRAM REPORT: CPT

## 2022-04-23 PROCEDURE — 74177 CT ABD & PELVIS W/CONTRAST: CPT | Mod: 26,MA

## 2022-04-23 PROCEDURE — 71045 X-RAY EXAM CHEST 1 VIEW: CPT | Mod: 26

## 2022-04-23 PROCEDURE — 99223 1ST HOSP IP/OBS HIGH 75: CPT

## 2022-04-23 PROCEDURE — 76856 US EXAM PELVIC COMPLETE: CPT | Mod: 26

## 2022-04-23 RX ORDER — SODIUM CHLORIDE 9 MG/ML
1000 INJECTION INTRAMUSCULAR; INTRAVENOUS; SUBCUTANEOUS ONCE
Refills: 0 | Status: DISCONTINUED | OUTPATIENT
Start: 2022-04-23 | End: 2022-04-23

## 2022-04-23 RX ORDER — IOHEXOL 300 MG/ML
30 INJECTION, SOLUTION INTRAVENOUS ONCE
Refills: 0 | Status: DISCONTINUED | OUTPATIENT
Start: 2022-04-23 | End: 2022-04-23

## 2022-04-23 RX ORDER — KETOROLAC TROMETHAMINE 30 MG/ML
30 SYRINGE (ML) INJECTION ONCE
Refills: 0 | Status: DISCONTINUED | OUTPATIENT
Start: 2022-04-23 | End: 2022-04-23

## 2022-04-23 RX ORDER — ACETAMINOPHEN 500 MG
650 TABLET ORAL ONCE
Refills: 0 | Status: COMPLETED | OUTPATIENT
Start: 2022-04-23 | End: 2022-04-23

## 2022-04-23 RX ORDER — MORPHINE SULFATE 50 MG/1
4 CAPSULE, EXTENDED RELEASE ORAL ONCE
Refills: 0 | Status: DISCONTINUED | OUTPATIENT
Start: 2022-04-23 | End: 2022-04-23

## 2022-04-23 RX ORDER — SODIUM CHLORIDE 9 MG/ML
1000 INJECTION INTRAMUSCULAR; INTRAVENOUS; SUBCUTANEOUS
Refills: 0 | Status: DISCONTINUED | OUTPATIENT
Start: 2022-04-23 | End: 2022-04-23

## 2022-04-23 RX ORDER — ACETAMINOPHEN 500 MG
650 TABLET ORAL EVERY 6 HOURS
Refills: 0 | Status: DISCONTINUED | OUTPATIENT
Start: 2022-04-23 | End: 2022-04-25

## 2022-04-23 RX ORDER — SODIUM CHLORIDE 9 MG/ML
2200 INJECTION INTRAMUSCULAR; INTRAVENOUS; SUBCUTANEOUS ONCE
Refills: 0 | Status: COMPLETED | OUTPATIENT
Start: 2022-04-23 | End: 2022-04-23

## 2022-04-23 RX ORDER — ONDANSETRON 8 MG/1
4 TABLET, FILM COATED ORAL ONCE
Refills: 0 | Status: COMPLETED | OUTPATIENT
Start: 2022-04-23 | End: 2022-04-23

## 2022-04-23 RX ORDER — MORPHINE SULFATE 50 MG/1
2 CAPSULE, EXTENDED RELEASE ORAL EVERY 6 HOURS
Refills: 0 | Status: DISCONTINUED | OUTPATIENT
Start: 2022-04-23 | End: 2022-04-25

## 2022-04-23 RX ORDER — KETOROLAC TROMETHAMINE 30 MG/ML
30 SYRINGE (ML) INJECTION EVERY 6 HOURS
Refills: 0 | Status: DISCONTINUED | OUTPATIENT
Start: 2022-04-23 | End: 2022-04-25

## 2022-04-23 RX ADMIN — Medication 30 MILLIGRAM(S): at 16:39

## 2022-04-23 RX ADMIN — MORPHINE SULFATE 4 MILLIGRAM(S): 50 CAPSULE, EXTENDED RELEASE ORAL at 15:47

## 2022-04-23 RX ADMIN — MORPHINE SULFATE 4 MILLIGRAM(S): 50 CAPSULE, EXTENDED RELEASE ORAL at 18:04

## 2022-04-23 RX ADMIN — ONDANSETRON 4 MILLIGRAM(S): 8 TABLET, FILM COATED ORAL at 15:47

## 2022-04-23 RX ADMIN — SODIUM CHLORIDE 1000 MILLILITER(S): 9 INJECTION INTRAMUSCULAR; INTRAVENOUS; SUBCUTANEOUS at 22:10

## 2022-04-23 RX ADMIN — SODIUM CHLORIDE 4400 MILLILITER(S): 9 INJECTION INTRAMUSCULAR; INTRAVENOUS; SUBCUTANEOUS at 16:00

## 2022-04-23 RX ADMIN — SODIUM CHLORIDE 125 MILLILITER(S): 9 INJECTION INTRAMUSCULAR; INTRAVENOUS; SUBCUTANEOUS at 16:03

## 2022-04-23 RX ADMIN — Medication 30 MILLIGRAM(S): at 16:09

## 2022-04-23 RX ADMIN — SODIUM CHLORIDE 2200 MILLILITER(S): 9 INJECTION INTRAMUSCULAR; INTRAVENOUS; SUBCUTANEOUS at 22:10

## 2022-04-23 RX ADMIN — MORPHINE SULFATE 4 MILLIGRAM(S): 50 CAPSULE, EXTENDED RELEASE ORAL at 16:17

## 2022-04-23 RX ADMIN — MORPHINE SULFATE 4 MILLIGRAM(S): 50 CAPSULE, EXTENDED RELEASE ORAL at 18:30

## 2022-04-23 NOTE — H&P ADULT - NSHPLABSRESULTS_GEN_ALL_CORE
LABS:                        8.7    9.11  )-----------( 206      ( 2022 15:24 )             27.9         140  |  110<H>  |  11  ----------------------------<  92  3.7   |  25  |  0.64    Ca    9.2      2022 15:24  Mg     1.7         TPro  6.5  /  Alb  3.1<L>  /  TBili  0.3  /  DBili  x   /  AST  25  /  ALT  25  /  AlkPhos  42        Urinalysis Basic - ( 2022 15:53 )    Color: Yellow / Appearance: Clear / S.010 / pH: x  Gluc: x / Ketone: Negative  / Bili: Negative / Urobili: Negative   Blood: x / Protein: Negative / Nitrite: Negative   Leuk Esterase: Negative / RBC: x / WBC x   Sq Epi: x / Non Sq Epi: x / Bacteria: x      LIVER FUNCTIONS - ( 2022 15:24 )  Alb: 3.1 g/dL / Pro: 6.5 g/dL / ALK PHOS: 42 U/L / ALT: 25 U/L DA / AST: 25 U/L / GGT: x           Lactate, Blood: 0.7 mmol/L (22 @ 16:15)  Lipase, Serum: 127 U/L (22 @ 15:24)    < from: CT Abdomen and Pelvis w/ IV Cont (22 @ 18:51) >    IMPRESSION:  Enlarged leiomyomatous uterus again identified.  Indeterminant liver lesion. Suggest follow-up MRI        < end of copied text >    < from: US Transvaginal (22 @ 16:49) >      FINDINGS:  Uterus: 13.8 x 11.2 x 9.7 cm. Multiple leiomyomata, largest fundal   intramural 4.6 x 3.6 x 3.9 cm. 3-4 cm leiomyoma appears to be submucosal  Endometrium: 8 mm. Within normal limits.    The ovaries were not visualized.    Fluid: None.    IMPRESSION:  Enlarged leiomyomatous uterus      < end of copied text >

## 2022-04-23 NOTE — H&P ADULT - PROBLEM SELECTOR PLAN 3
CT abd not severe stool burden, likely also having low PO intake with abd pain and discomfort  no signs of acute abdomen  - c/w bowel regimen

## 2022-04-23 NOTE — H&P ADULT - NSHPREVIEWOFSYSTEMS_GEN_ALL_CORE
REVIEW OF SYSTEMS:  CONSTITUTIONAL: + fever, No weight loss, or fatigue  RESPIRATORY: No cough, wheezing, chills or hemoptysis; No shortness of breath  CARDIOVASCULAR: No chest pain, palpitations, dizziness, or leg swelling  GASTROINTESTINAL: + abdominal pain. No nausea, vomiting, or hematemesis; No diarrhea + constipation. No melena or hematochezia.  GENITOURINARY: No dysuria or hematuria, urinary frequency + vaginal discharge  NEUROLOGICAL: No headaches, memory loss, loss of strength, numbness, or tremors  SKIN: No itching, burning, rashes, or lesions

## 2022-04-23 NOTE — H&P ADULT - PROBLEM SELECTOR PLAN 4
Tmax 101.2, NO leukocytosis, lactate negative  UA negative, CT abd no overt infectious foci  defer abx at this time  - f/u BCx, UCx  - tylenol PRN  - f/u chlamydia, gonorrhea

## 2022-04-23 NOTE — ED PROVIDER NOTE - IV ALTEPLASE DOOR HIDDEN
June 22, 2021     Patient: Jimmy Mcelroy   YOB: 1982   Date of Visit: 6/22/2021       To Whom It May Concern:    He was seen in our office for his medical appointment this morning. It is my medical opinion that Shilo Lerma may return to full duty immediately with no restrictions. If you have any questions or concerns, please don't hesitate to call. Sincerely,          Ania Lopez M.D., M.P.H  [address] [place] [address].  [address] [place] [address] [address]  [facility], [address] [facility]  Phone: [phone]  Fax: [phone]
show

## 2022-04-23 NOTE — H&P ADULT - ASSESSMENT
45 yo F w/ Hx uterine fibroids, symptomatic anemia 2/2 heavy menstrual bleeding, pre-syncope, asthma, pancreatic lesion, IBS and R fibular fracture July 2020 s/p ORIF who is coming in with acute worsening of her chronic abdominal pain.

## 2022-04-23 NOTE — ED PROVIDER NOTE - NEUROLOGICAL, MLM
Alert and oriented, no focal deficits, no motor or sensory deficits. Alert and oriented, no focal deficits, no motor or sensory deficits.  No nuchal rigidity

## 2022-04-23 NOTE — H&P ADULT - HISTORY OF PRESENT ILLNESS
45 yo F w/ Hx uterine fibroids, symptomatic anemia 2/2 heavy menstrual bleeding, pre-syncope, asthma, pancreatic lesion, IBS and R fibular fracture July 2020 s/p ORIF who is coming in with acute worsening of her chronic abdominal pain. Ms. Cowan says that over the last 3 days she has had severe worsening of her abdominal pain, 10/10 in her LLQ and suprapubic area with chronic yellow discharge and subjective fevers. Also reports constipation with no proper bowel movement in 2 weeks. Is being followed as o/p w/ ob-gyn w/ elevated CA-125 and plan for hysterectomy. Was seen at [facility] March 2022 for SOB, chest pain and heavy menstrual bleeding w/ Hb 10.5 where they did a CTA chest which showed lesions suspicious for malignancy (sub-centimeter hypodense pancreatic lesion, sclerotic lesion in T7) w/ recs to emergently follow up with PMD.     In ED Gyn was consulted who rec outpatient hysterectomy.

## 2022-04-23 NOTE — ED PROVIDER NOTE - CLINICAL SUMMARY MEDICAL DECISION MAKING FREE TEXT BOX
pt with h/o uterine fibroid, now with fever, chills, pain concern for degenerating fibroid vs other pathology.  Will get labs, sono, CT, poss gyn consult

## 2022-04-23 NOTE — ED PROVIDER NOTE - CARE PLAN
Principal Discharge DX:	Acute febrile illness  Secondary Diagnosis:	Uterine fibroid  Secondary Diagnosis:	Pelvic pain   1

## 2022-04-23 NOTE — H&P ADULT - NSHPPHYSICALEXAM_GEN_ALL_CORE
PHYSICAL EXAMINATION:  GENERAL: NAD, thin on RA  HEAD:  Atraumatic, Normocephalic  EYES:  conjunctiva and sclera clear  NECK: Supple, No JVD, Normal thyroid  CHEST/LUNG: Clear to auscultation. No rales, rhonchi, wheezing, or rubs  HEART: Regular rate and rhythm; No murmurs, rubs, or gallops  ABDOMEN: Soft, exquisite tenderness over entire abd. Nondistended; Bowel sounds present  NERVOUS SYSTEM:  Alert & Oriented X3,    EXTREMITIES:  2+ Peripheral Pulses, No clubbing, cyanosis, or edema  SKIN: warm dry

## 2022-04-23 NOTE — ED PROVIDER NOTE - OBJECTIVE STATEMENT
46 y.o. female LMP 2 weeks, pt was told with poss Lt ovarian ca 1-2 mos ago, has not seen gyn oncology.  Pt also with h/o uterine fibroid, no intervention yet.  Pt c/o on & off Lt lower pelvic pain for 3 days radiated to LUQ, subjective fever, chills, HA, photophobia, not feeling hungry, yellowish vag. discharge, last BM 2 weeks ago, pt's passing gas, no nausea, vomiting, dysuria, pt took Motrin with no relief.  Pt has similar pain in the past.  Pt's vaccinated for COVID

## 2022-04-23 NOTE — CONSULT NOTE ADULT - SUBJECTIVE AND OBJECTIVE BOX
45 y/o female LMP 4/7/22 with PMHx of asthma and fibroid uterus presents to ED c/o left lower abdominal pain x 3 days with fever. Pt had fever today in .2. Pt reports she was told she had ovarian cancer 3 weeks ago after she went to another ED and got a blood test with a tumor marker which was elevated. However, there is no record of elevated tumor markers or any imaging suggestive of ovarian cancer? Pt reports she has not followed up with a gyn/onc or received a referral from her obgyn.      Pt denies any CP, SOB, N/V/D/C, chills, palpitations, cough, nasal congestion, vaginal bleeding or any other complaints.    Pt has a private GYN affiliated with [facility] states she saw her a few weeks ago   pt states she is going to get a hysterectomy for her fibroids and also removal of her left ovary  PAST MEDICAL & SURGICAL HISTORY:  Asthma    IBS (irritable bowel syndrome)    Fracture of right fibula    Vitamin D deficiency    Anemia    Uterine leiomyoma    History of ankle surgery    Home Medications:  albuterol 2.5 mg/3 mL (0.083%) inhalation solution: 3 milliliter(s) inhaled every 6 hours, As Needed (23 Jul 2020 14:55)  ferrous sulfate 325 mg (65 mg elemental iron) oral tablet: 1 tab(s) orally once a day (23 Jul 2020 14:55)  Tylenol 500 mg oral tablet: 2 tab(s) orally every 6 hours, As Needed (23 Jul 2020 14:55)  Vitamin D3 50,000 intl units (1250 mcg) oral capsule: 1 cap(s) orally once a week (23 Jul 2020 14:55)  Allergies    No Known Allergies    Intolerances      Vital Signs Last 24 Hrs  T(F): 98.5 (04-23-22 @ 17:45), Max: 101.2 (04-23-22 @ 15:55)  HR: 80 (04-23-22 @ 17:45) (80 - 100)  BP: 100/61 (04-23-22 @ 17:45) (100/61 - 136/77)  RR: 17 (04-23-22 @ 17:45) (17 - 18)  SpO2: 96% (04-23-22 @ 17:45) (96% - 98%)    PE -- performed by beau Lopez attending  Gen: A&Ox3, NAD  Abd: soft, nondistended, (+) tenderness to palpation of left lower quadrant no rebound tenderness or guarding  pelvic: no vb                          8.7    9.11  )-----------( 206      ( 23 Apr 2022 15:24 )             27.9       04-23    140  |  110<H>  |  11  ----------------------------<  92  3.7   |  25  |  0.64    Ca    9.2      23 Apr 2022 15:24  Mg     1.7     04-23    TPro  6.5  /  Alb  3.1<L>  /  TBili  0.3  /  DBili  x   /  AST  25  /  ALT  25  /  AlkPhos  42  04-23    < from: US Transvaginal (04.23.22 @ 16:49) >    ACC: [id number] EXAM:  US TRANSVAGINAL                        ACC: [id number] EXAM:  US PELVIC COMPLETE                          PROCEDURE DATE:  04/23/2022      INTERPRETATION:  CLINICAL INFORMATION: 46-year-old female with left lower   quadrant pain and fever.    LMP: 04/07/2022  .  COMPARISON: Ultrasound 08/16/2021. MRI 12/20/2021    TECHNIQUE:  Endovaginal and transabdominal pelvic sonogram.    FINDINGS:  Uterus: 13.8 x 11.2 x 9.7 cm. Multiple leiomyomata, largest fundal   intramural 4.6 x 3.6 x 3.9 cm. 3-4 cm leiomyoma appears to be submucosal  Endometrium: 8 mm. Within normal limits.    The ovaries were not visualized.    Fluid: None.    IMPRESSION:  Enlarged leiomyomatous uterus      --- End of Report ---    FELICIA COTA MD; Attending Radiologist  This document has been electronically signed. Apr 23 2022  5:18PM      < from: CT Abdomen and Pelvis w/ IV Cont (04.23.22 @ 18:51) >  BLADDER: Within normal limits.  REPRODUCTIVE ORGANS: Enlarged uterus with 4.8 cm intracavitary submucosal   leiomyoma    BOWEL: No bowel obstruction. Appendix not visualized.  PERITONEUM: No ascites.  VESSELS: Within normal limits.  RETROPERITONEUM/LYMPH NODES: No lymphadenopathy.  ABDOMINAL WALL: Within normal limits.  BONES: Within normal limits.    IMPRESSION:  Enlarged leiomyomatous uterus again identified.  Indeterminant liver lesion. Suggest follow-up MRI      --- End of Report ---    FELICIA COTA MD; Attending Radiologist  This document has been electronically signed. Apr 23 2022  7:05PM             47 y/o female LMP 4/7/22 with PMHx of asthma and fibroid uterus presents to ED c/o left lower abdominal pain x 3 days with fever. Pt had fever today in .2. Pt reports she was told she had ovarian cancer 3 weeks ago after got a blood test (CA- 125 was 105). However,  no imaging is suggestive of ovarian cancer. Pt reports she has not followed up with a gyn/onc or received a referral from her obgyn.      Pt denies any CP, SOB, N/V/D/C, chills, palpitations, cough, nasal congestion, vaginal bleeding or any other complaints.    Pt has a private GYN affiliated with [facility] states she saw her a few weeks ago   pt states she is going to get a hysterectomy for her fibroids and also removal of her left ovary  PAST MEDICAL & SURGICAL HISTORY:  Asthma    IBS (irritable bowel syndrome)    Fracture of right fibula    Vitamin D deficiency    Anemia    Uterine leiomyoma    History of ankle surgery    Home Medications:  albuterol 2.5 mg/3 mL (0.083%) inhalation solution: 3 milliliter(s) inhaled every 6 hours, As Needed (23 Jul 2020 14:55)  ferrous sulfate 325 mg (65 mg elemental iron) oral tablet: 1 tab(s) orally once a day (23 Jul 2020 14:55)  Tylenol 500 mg oral tablet: 2 tab(s) orally every 6 hours, As Needed (23 Jul 2020 14:55)  Vitamin D3 50,000 intl units (1250 mcg) oral capsule: 1 cap(s) orally once a week (23 Jul 2020 14:55)  Allergies    No Known Allergies    Intolerances      Vital Signs Last 24 Hrs  T(F): 98.5 (04-23-22 @ 17:45), Max: 101.2 (04-23-22 @ 15:55)  HR: 80 (04-23-22 @ 17:45) (80 - 100)  BP: 100/61 (04-23-22 @ 17:45) (100/61 - 136/77)  RR: 17 (04-23-22 @ 17:45) (17 - 18)  SpO2: 96% (04-23-22 @ 17:45) (96% - 98%)    PE -- performed by beau Lopez attending  Gen: A&Ox3, NAD  Abd: soft, nondistended, (+) tenderness to palpation of left lower quadrant no rebound tenderness or guarding  pelvic: no vb                          8.7    9.11  )-----------( 206      ( 23 Apr 2022 15:24 )             27.9       04-23    140  |  110<H>  |  11  ----------------------------<  92  3.7   |  25  |  0.64    Ca    9.2      23 Apr 2022 15:24  Mg     1.7     04-23    TPro  6.5  /  Alb  3.1<L>  /  TBili  0.3  /  DBili  x   /  AST  25  /  ALT  25  /  AlkPhos  42  04-23    < from: US Transvaginal (04.23.22 @ 16:49) >    ACC: [id number] EXAM:  US TRANSVAGINAL                        ACC: [id number] EXAM:  US PELVIC COMPLETE                          PROCEDURE DATE:  04/23/2022      INTERPRETATION:  CLINICAL INFORMATION: 46-year-old female with left lower   quadrant pain and fever.    LMP: 04/07/2022  .  COMPARISON: Ultrasound 08/16/2021. MRI 12/20/2021    TECHNIQUE:  Endovaginal and transabdominal pelvic sonogram.    FINDINGS:  Uterus: 13.8 x 11.2 x 9.7 cm. Multiple leiomyomata, largest fundal   intramural 4.6 x 3.6 x 3.9 cm. 3-4 cm leiomyoma appears to be submucosal  Endometrium: 8 mm. Within normal limits.    The ovaries were not visualized.    Fluid: None.    IMPRESSION:  Enlarged leiomyomatous uterus      --- End of Report ---    FELICIA COTA MD; Attending Radiologist  This document has been electronically signed. Apr 23 2022  5:18PM      < from: CT Abdomen and Pelvis w/ IV Cont (04.23.22 @ 18:51) >  BLADDER: Within normal limits.  REPRODUCTIVE ORGANS: Enlarged uterus with 4.8 cm intracavitary submucosal   leiomyoma    BOWEL: No bowel obstruction. Appendix not visualized.  PERITONEUM: No ascites.  VESSELS: Within normal limits.  RETROPERITONEUM/LYMPH NODES: No lymphadenopathy.  ABDOMINAL WALL: Within normal limits.  BONES: Within normal limits.    IMPRESSION:  Enlarged leiomyomatous uterus again identified.  Indeterminant liver lesion. Suggest follow-up MRI      --- End of Report ---    FELIICA COTA MD; Attending Radiologist  This document has been electronically signed. Apr 23 2022  7:05PM

## 2022-04-23 NOTE — H&P ADULT - PROBLEM SELECTOR PLAN 1
acute on chronic worsening, c/f uterine fibroid vs malignancy vs endometriosis vs PID  CT abd showing enlarged leiomyomatous uterus and indeterminant liver lesion  US shows Uterus: 13.8 x 11.2 x 9.7 cm. Multiple leiomyomata, largest fundal, intramural 4.6 x 3.6 x 3.9 cm. 3-4 cm leiomyoma appears to be submucosal, Endometrium: 8 mm. Within normal limits. The ovaries were not visualized.  LFTs and Lipase wnl  - admit to medicine  - pain control tylenol, ketorolac, morphine  - liver lesion and pancreatic lesion being followed by o/p GI, with plans for o/p MRI  PAIN MANAGEMENT consult in AM  GYN consulted by ED  -per Dr. Hill, no acute GYN intervention at this time, gyn etiology of fever unlikely   -pt will need a hysterectomy for fibroid uterus which can be set up outpatient  -pt agrees and understands plan of care   -case d/w Dr. Borja   -pt evaluated at bedsidei with Dr. Hill, house attending acute on chronic worsening, c/f uterine fibroid vs malignancy vs endometriosis vs PID  CT abd showing enlarged leiomyomatous uterus and indeterminant liver lesion  US shows Uterus: 13.8 x 11.2 x 9.7 cm. Multiple leiomyomata, largest fundal, intramural 4.6 x 3.6 x 3.9 cm. 3-4 cm leiomyoma appears to be submucosal, Endometrium: 8 mm. Within normal limits. The ovaries were not visualized.  LFTs and Lipase wnl  - admit to medicine  - pain control tylenol, ketorolac, morphine  - f/u tumor markers  - liver lesion and pancreatic lesion being followed by o/p GI, with plans for o/p MRI    PAIN MANAGEMENT consulted    GYN consulted by ED  -per Dr. Hill, no acute GYN intervention at this time, gyn etiology of fever unlikely   -pt will need a hysterectomy for fibroid uterus which can be set up outpatient  -pt agrees and understands plan of care   -case d/w Dr. Borja   -pt evaluated at bedsidei with beau Lopez attending

## 2022-04-23 NOTE — H&P ADULT - ATTENDING COMMENTS
Discussed with MAR Discussed with MAR.    This is a 47 y/o female with history of uterine fibroids presenting with severe LLQ/pelvic pain over the past 3 days with associated fever. Reports she has had similar episodes of pain in the past. Patient reports that the pain has been getting worse over the past few days, and expresses frustration that she has not yet had a hysterectomy, which had been discussed with her outpatient. She was told her CA-125 level was elevated outpatient and that she may have ovarian cancer, but says she was not given any information beyond this. Patient reports her LMP was about 2 weeks ago, but says she will typically have some bleeding every day of the month. She reports noting yellow vaginal discharge for quite some time. Denies any other complaints. Patient is not sexually active.    Vital Signs Last 24 Hrs  T(C): 37.7 (23 Apr 2022 22:10), Max: 38.4 (23 Apr 2022 15:55)  T(F): 99.8 (23 Apr 2022 22:10), Max: 101.2 (23 Apr 2022 15:55)  HR: 78 (23 Apr 2022 22:10) (78 - 100)  BP: 101/60 (23 Apr 2022 22:10) (100/61 - 136/77)  BP(mean): --  RR: 18 (23 Apr 2022 22:10) (17 - 18)  SpO2: 98% (23 Apr 2022 22:10) (96% - 98%)    PEx:  as above    A/P:  #Intractable pelvic pain  - likely 2/2 to enlarged uterine fibroids; ddx includes endometriosis, PID  - GYN eval noted  - CT and pelvic sono reviewed; no acute pathology  - pain control  - pain management consult  - will need outpatient GYN f/u to proceed with hysterectomy    #Fever  - etiology unclear at present  - no leukocytosis or focal symptoms aside from pelvic pain  - contrast-enhanced CT abdomen/pelvis shows no source of infection  - COVID-19 PCR negative; UA negative: CXR unremarkable  - f/u blood and urine cx  - cannot r/o that this may be related to underlying neoplasm; defer abx at this time  - supportive care at this time    #Microcytic anemia  - H/H typically fluctuates on review of outpatient records  - likely 2/2 to iron deficiency  - monitor CBC    #Asthma  - not in exacerbation  - bronchodilators prn    #Prophylactic measure  dvt ppx Discussed with MAR.    This is a 45 y/o female with history of uterine fibroids presenting with severe LLQ/pelvic pain over the past 3 days with associated fever. Reports she has had similar episodes of pain in the past. Patient reports that the pain has been getting worse over the past few days, and expresses frustration that she has not yet had a hysterectomy, which had been discussed with her outpatient. She was told her CA-125 level was elevated outpatient and that she may have ovarian cancer, but says she was not given any information beyond this. Patient reports her LMP was about 2 weeks ago, but says she will typically have some bleeding every day of the month. She reports noting yellow vaginal discharge for quite some time. Denies any other complaints. Patient is not sexually active.    Vital Signs Last 24 Hrs  T(C): 37.7 (23 Apr 2022 22:10), Max: 38.4 (23 Apr 2022 15:55)  T(F): 99.8 (23 Apr 2022 22:10), Max: 101.2 (23 Apr 2022 15:55)  HR: 78 (23 Apr 2022 22:10) (78 - 100)  BP: 101/60 (23 Apr 2022 22:10) (100/61 - 136/77)  BP(mean): --  RR: 18 (23 Apr 2022 22:10) (17 - 18)  SpO2: 98% (23 Apr 2022 22:10) (96% - 98%)    PEx:  as above    A/P:  #Intractable pelvic pain  - likely 2/2 to enlarged uterine fibroids; ddx includes endometriosis, PID  - GYN eval noted  - CT and pelvic sono reviewed; no acute pathology  - pain control  - pain management consult  - will need outpatient GYN f/u to proceed with hysterectomy    #Fever of unknown origin  - etiology unclear at present  - no leukocytosis or focal symptoms aside from pelvic pain  - contrast-enhanced CT abdomen/pelvis shows no source of infection  - COVID-19 PCR negative; UA negative: CXR unremarkable  - f/u blood and urine cx  - cannot r/o that this may be related to underlying neoplasm; defer abx at this time  - supportive care at this time    #Microcytic anemia  - H/H typically fluctuates on review of outpatient records  - likely 2/2 to iron deficiency  - monitor CBC    #Asthma  - not in exacerbation  - bronchodilators prn    #Prophylactic measure  dvt ppx Discussed with MAR PGY2 Dr. Vaughan.    This is a 47 y/o female with history of uterine fibroids presenting with severe LLQ/pelvic pain over the past 3 days with associated fever. Reports she has had similar episodes of pain in the past. Patient reports that the pain has been getting worse over the past few days, and expresses frustration that she has not yet had a hysterectomy, which had been discussed with her outpatient. She was told her CA-125 level was elevated outpatient and that she may have ovarian cancer, but says she was not given any information beyond this. Patient reports her LMP was about 2 weeks ago, but says she will typically have some bleeding every day of the month. She reports noting yellow vaginal discharge for quite some time. Denies any other complaints. Patient is not sexually active.    Vital Signs Last 24 Hrs  T(C): 37.7 (23 Apr 2022 22:10), Max: 38.4 (23 Apr 2022 15:55)  T(F): 99.8 (23 Apr 2022 22:10), Max: 101.2 (23 Apr 2022 15:55)  HR: 78 (23 Apr 2022 22:10) (78 - 100)  BP: 101/60 (23 Apr 2022 22:10) (100/61 - 136/77)  BP(mean): --  RR: 18 (23 Apr 2022 22:10) (17 - 18)  SpO2: 98% (23 Apr 2022 22:10) (96% - 98%)    PEx:  as above    A/P:  #Intractable pelvic pain  - likely 2/2 to enlarged uterine fibroids; ddx includes endometriosis, PID  - GYN eval noted  - CT and pelvic sono reviewed; no acute pathology  - pain control  - pain management consult  - will need outpatient GYN f/u to proceed with hysterectomy    #Fever of unknown origin  - etiology unclear at present  - no leukocytosis or focal symptoms aside from pelvic pain  - contrast-enhanced CT abdomen/pelvis shows no source of infection  - COVID-19 PCR negative; UA negative: CXR unremarkable  - f/u blood and urine cx  - cannot r/o that this may be related to underlying neoplasm; defer abx at this time  - supportive care at this time    #Microcytic anemia  - H/H typically fluctuates on review of outpatient records  - likely 2/2 to iron deficiency  - monitor CBC    #Asthma  - not in exacerbation  - bronchodilators prn    #Prophylactic measure  dvt ppx Discussed with MAR PGY2 Dr. Vaughan.    This is a 45 y/o female with history of uterine fibroids presenting with severe LLQ/pelvic pain over the past 3 days with associated fever. Reports she has had similar episodes of pain in the past. Patient reports that the pain has been getting worse over the past few days, and expresses frustration that she has not yet had a hysterectomy, which had been discussed with her outpatient. She was told her CA-125 level was elevated outpatient and that she may have ovarian cancer, but says she was not given any information beyond this. Patient reports her LMP was about 2 weeks ago, but says she will typically have some bleeding every day of the month. She reports noting yellow vaginal discharge for quite some time. Denies any other complaints. Patient is not sexually active.    Vital Signs Last 24 Hrs  T(C): 37.7 (23 Apr 2022 22:10), Max: 38.4 (23 Apr 2022 15:55)  T(F): 99.8 (23 Apr 2022 22:10), Max: 101.2 (23 Apr 2022 15:55)  HR: 78 (23 Apr 2022 22:10) (78 - 100)  BP: 101/60 (23 Apr 2022 22:10) (100/61 - 136/77)  BP(mean): --  RR: 18 (23 Apr 2022 22:10) (17 - 18)  SpO2: 98% (23 Apr 2022 22:10) (96% - 98%)    PEx:  as above    A/P:  #Intractable pelvic pain  - likely 2/2 to enlarged uterine fibroids; ddx includes endometriosis, PID  - GYN eval noted  - CT and pelvic sono reviewed; no acute pathology  - pain control  - pain management consult  - will need outpatient GYN f/u to proceed with hysterectomy    #Fever of unknown origin  - etiology unclear at present  - no leukocytosis or focal symptoms aside from pelvic pain  - contrast-enhanced CT abdomen/pelvis shows no source of infection, however known pancreatic lesion seen again as well as liver lesion  - COVID-19 PCR negative; UA negative: CXR unremarkable  - f/u blood and urine cx  - cannot r/o that this may be related to underlying neoplasm/?mets; defer abx at this time  - supportive care at this time    #Microcytic anemia  - H/H typically fluctuates on review of outpatient records  - likely 2/2 to iron deficiency  - monitor CBC  - iron supplement    #Asthma  - not in exacerbation  - bronchodilators prn    #Prophylactic measure  scd boots for dvt ppx given anemia from menorrhagia Discussed with MAR PGY2 Dr. Vaughan.    This is a 47 y/o female with history of uterine fibroids presenting with severe LLQ/pelvic pain over the past 3 days with associated fever. Reports she has had similar episodes of pain in the past. Patient reports that the pain has been getting worse over the past few days, and expresses frustration that she has not yet had a hysterectomy, which had been discussed with her outpatient. She was told her CA-125 level was elevated outpatient and that she may have ovarian cancer, but says she was not given any information beyond this. Patient reports her LMP was about 2 weeks ago, but says she will typically have some bleeding every day of the month. She reports noting yellow vaginal discharge for quite some time. Denies any other complaints. Patient is not sexually active.    Vital Signs Last 24 Hrs  T(C): 37.7 (23 Apr 2022 22:10), Max: 38.4 (23 Apr 2022 15:55)  T(F): 99.8 (23 Apr 2022 22:10), Max: 101.2 (23 Apr 2022 15:55)  HR: 78 (23 Apr 2022 22:10) (78 - 100)  BP: 101/60 (23 Apr 2022 22:10) (100/61 - 136/77)  BP(mean): --  RR: 18 (23 Apr 2022 22:10) (17 - 18)  SpO2: 98% (23 Apr 2022 22:10) (96% - 98%)    PEx:  as above    A/P:  #Intractable pelvic pain  - likely 2/2 to enlarged uterine fibroids; ddx includes endometriosis, PID  - GYN eval noted  - CT and pelvic sono reviewed; no acute pathology  - pain control  - pain management consult  - will need outpatient GYN f/u to proceed with hysterectomy    #Fever of unknown origin  - etiology unclear at present  - no leukocytosis or focal symptoms aside from pelvic pain  - contrast-enhanced CT abdomen/pelvis shows no source of infection, however known pancreatic lesion seen again as well as liver lesion  - COVID-19 PCR negative; UA negative: CXR unremarkable  - f/u blood and urine cx  - cannot r/o that this may be related to underlying neoplasm/?mets; defer abx at this time  - repeat CA-125 level, check CA 19-9 level  - supportive care at this time    #Microcytic anemia  - H/H typically fluctuates on review of outpatient records  - likely 2/2 to iron deficiency  - monitor CBC  - iron supplement    #Asthma  - not in exacerbation  - bronchodilators prn    #Prophylactic measure  scd boots for dvt ppx given anemia from menorrhagia

## 2022-04-23 NOTE — ED PROVIDER NOTE - PROGRESS NOTE DETAILS
pt with uterine fibroid, Hgb dropped from 10.5 0n 3/22 to 8.7 today, LLQ pain, case d/w Gyn, will see pt. Seen by gyn, does not required immediate gyn pathology.  Pt c/o not feeling well, feeling she's febrile again.  Will admit pt. case d/w Dr. Melendez,

## 2022-04-24 ENCOUNTER — TRANSCRIPTION ENCOUNTER (OUTPATIENT)
Age: 47
End: 2022-04-24

## 2022-04-24 DIAGNOSIS — K59.00 CONSTIPATION, UNSPECIFIED: ICD-10-CM

## 2022-04-24 DIAGNOSIS — R50.9 FEVER, UNSPECIFIED: ICD-10-CM

## 2022-04-24 DIAGNOSIS — D64.9 ANEMIA, UNSPECIFIED: ICD-10-CM

## 2022-04-24 DIAGNOSIS — N89.8 OTHER SPECIFIED NONINFLAMMATORY DISORDERS OF VAGINA: ICD-10-CM

## 2022-04-24 DIAGNOSIS — R10.9 UNSPECIFIED ABDOMINAL PAIN: ICD-10-CM

## 2022-04-24 DIAGNOSIS — Z29.9 ENCOUNTER FOR PROPHYLACTIC MEASURES, UNSPECIFIED: ICD-10-CM

## 2022-04-24 DIAGNOSIS — J45.909 UNSPECIFIED ASTHMA, UNCOMPLICATED: ICD-10-CM

## 2022-04-24 DIAGNOSIS — K58.9 IRRITABLE BOWEL SYNDROME WITHOUT DIARRHEA: ICD-10-CM

## 2022-04-24 LAB
ALBUMIN SERPL ELPH-MCNC: 2.7 G/DL — LOW (ref 3.5–5)
ALP SERPL-CCNC: 38 U/L — LOW (ref 40–120)
ALT FLD-CCNC: 25 U/L DA — SIGNIFICANT CHANGE UP (ref 10–60)
ANION GAP SERPL CALC-SCNC: 4 MMOL/L — LOW (ref 5–17)
AST SERPL-CCNC: 22 U/L — SIGNIFICANT CHANGE UP (ref 10–40)
BASOPHILS # BLD AUTO: 0.02 K/UL — SIGNIFICANT CHANGE UP (ref 0–0.2)
BASOPHILS NFR BLD AUTO: 0.4 % — SIGNIFICANT CHANGE UP (ref 0–2)
BILIRUB SERPL-MCNC: 0.3 MG/DL — SIGNIFICANT CHANGE UP (ref 0.2–1.2)
BLD GP AB SCN SERPL QL: SIGNIFICANT CHANGE UP
BUN SERPL-MCNC: 5 MG/DL — LOW (ref 7–18)
CALCIUM SERPL-MCNC: 9 MG/DL — SIGNIFICANT CHANGE UP (ref 8.4–10.5)
CHLORIDE SERPL-SCNC: 110 MMOL/L — HIGH (ref 96–108)
CO2 SERPL-SCNC: 25 MMOL/L — SIGNIFICANT CHANGE UP (ref 22–31)
CREAT SERPL-MCNC: 0.55 MG/DL — SIGNIFICANT CHANGE UP (ref 0.5–1.3)
CULTURE RESULTS: SIGNIFICANT CHANGE UP
EGFR: 114 ML/MIN/1.73M2 — SIGNIFICANT CHANGE UP
EOSINOPHIL # BLD AUTO: 0.03 K/UL — SIGNIFICANT CHANGE UP (ref 0–0.5)
EOSINOPHIL NFR BLD AUTO: 0.6 % — SIGNIFICANT CHANGE UP (ref 0–6)
FERRITIN SERPL-MCNC: 11 NG/ML — LOW (ref 15–150)
GLUCOSE BLDC GLUCOMTR-MCNC: 132 MG/DL — HIGH (ref 70–99)
GLUCOSE SERPL-MCNC: 89 MG/DL — SIGNIFICANT CHANGE UP (ref 70–99)
HAPTOGLOB SERPL-MCNC: 97 MG/DL — SIGNIFICANT CHANGE UP (ref 34–200)
HCT VFR BLD CALC: 27.5 % — LOW (ref 34.5–45)
HGB BLD-MCNC: 8.3 G/DL — LOW (ref 11.5–15.5)
IMM GRANULOCYTES NFR BLD AUTO: 0.2 % — SIGNIFICANT CHANGE UP (ref 0–1.5)
IRON SATN MFR SERPL: 15 UG/DL — LOW (ref 40–150)
IRON SATN MFR SERPL: 5 % — LOW (ref 15–50)
LYMPHOCYTES # BLD AUTO: 0.92 K/UL — LOW (ref 1–3.3)
LYMPHOCYTES # BLD AUTO: 19.6 % — SIGNIFICANT CHANGE UP (ref 13–44)
MAGNESIUM SERPL-MCNC: 1.8 MG/DL — SIGNIFICANT CHANGE UP (ref 1.6–2.6)
MCHC RBC-ENTMCNC: 24.6 PG — LOW (ref 27–34)
MCHC RBC-ENTMCNC: 30.2 GM/DL — LOW (ref 32–36)
MCV RBC AUTO: 81.4 FL — SIGNIFICANT CHANGE UP (ref 80–100)
MONOCYTES # BLD AUTO: 0.72 K/UL — SIGNIFICANT CHANGE UP (ref 0–0.9)
MONOCYTES NFR BLD AUTO: 15.4 % — HIGH (ref 2–14)
NEUTROPHILS # BLD AUTO: 2.99 K/UL — SIGNIFICANT CHANGE UP (ref 1.8–7.4)
NEUTROPHILS NFR BLD AUTO: 63.8 % — SIGNIFICANT CHANGE UP (ref 43–77)
NRBC # BLD: 0 /100 WBCS — SIGNIFICANT CHANGE UP (ref 0–0)
PHOSPHATE SERPL-MCNC: 2.3 MG/DL — LOW (ref 2.5–4.5)
PLATELET # BLD AUTO: 201 K/UL — SIGNIFICANT CHANGE UP (ref 150–400)
POTASSIUM SERPL-MCNC: 3.8 MMOL/L — SIGNIFICANT CHANGE UP (ref 3.5–5.3)
POTASSIUM SERPL-SCNC: 3.8 MMOL/L — SIGNIFICANT CHANGE UP (ref 3.5–5.3)
PROT SERPL-MCNC: 5.7 G/DL — LOW (ref 6–8.3)
RAPID RVP RESULT: SIGNIFICANT CHANGE UP
RBC # BLD: 3.38 M/UL — LOW (ref 3.8–5.2)
RBC # BLD: 3.38 M/UL — LOW (ref 3.8–5.2)
RBC # FLD: 16.9 % — HIGH (ref 10.3–14.5)
RETICS #: 47 K/UL — SIGNIFICANT CHANGE UP (ref 25–125)
RETICS/RBC NFR: 1.4 % — SIGNIFICANT CHANGE UP (ref 0.5–2.5)
SARS-COV-2 RNA SPEC QL NAA+PROBE: SIGNIFICANT CHANGE UP
SODIUM SERPL-SCNC: 139 MMOL/L — SIGNIFICANT CHANGE UP (ref 135–145)
SPECIMEN SOURCE: SIGNIFICANT CHANGE UP
TIBC SERPL-MCNC: 326 UG/DL — SIGNIFICANT CHANGE UP (ref 250–450)
TRANSFERRIN SERPL-MCNC: 259 MG/DL — SIGNIFICANT CHANGE UP (ref 200–360)
UIBC SERPL-MCNC: 311 UG/DL — SIGNIFICANT CHANGE UP (ref 110–370)
WBC # BLD: 4.69 K/UL — SIGNIFICANT CHANGE UP (ref 3.8–10.5)
WBC # FLD AUTO: 4.69 K/UL — SIGNIFICANT CHANGE UP (ref 3.8–10.5)

## 2022-04-24 RX ORDER — ALBUTEROL 90 UG/1
2 AEROSOL, METERED ORAL EVERY 6 HOURS
Refills: 0 | Status: DISCONTINUED | OUTPATIENT
Start: 2022-04-24 | End: 2022-04-25

## 2022-04-24 RX ORDER — FERROUS SULFATE 325(65) MG
325 TABLET ORAL DAILY
Refills: 0 | Status: DISCONTINUED | OUTPATIENT
Start: 2022-04-24 | End: 2022-04-25

## 2022-04-24 RX ORDER — SODIUM,POTASSIUM PHOSPHATES 278-250MG
1 POWDER IN PACKET (EA) ORAL ONCE
Refills: 0 | Status: COMPLETED | OUTPATIENT
Start: 2022-04-24 | End: 2022-04-24

## 2022-04-24 RX ORDER — POLYETHYLENE GLYCOL 3350 17 G/17G
17 POWDER, FOR SOLUTION ORAL
Refills: 0 | Status: DISCONTINUED | OUTPATIENT
Start: 2022-04-24 | End: 2022-04-25

## 2022-04-24 RX ORDER — PANTOPRAZOLE SODIUM 20 MG/1
40 TABLET, DELAYED RELEASE ORAL
Refills: 0 | Status: DISCONTINUED | OUTPATIENT
Start: 2022-04-24 | End: 2022-04-25

## 2022-04-24 RX ORDER — ENOXAPARIN SODIUM 100 MG/ML
40 INJECTION SUBCUTANEOUS EVERY 24 HOURS
Refills: 0 | Status: DISCONTINUED | OUTPATIENT
Start: 2022-04-24 | End: 2022-04-24

## 2022-04-24 RX ORDER — POLYETHYLENE GLYCOL 3350 17 G/17G
17 POWDER, FOR SOLUTION ORAL
Refills: 0 | Status: DISCONTINUED | OUTPATIENT
Start: 2022-04-24 | End: 2022-04-24

## 2022-04-24 RX ORDER — CHOLECALCIFEROL (VITAMIN D3) 125 MCG
2000 CAPSULE ORAL DAILY
Refills: 0 | Status: DISCONTINUED | OUTPATIENT
Start: 2022-04-24 | End: 2022-04-25

## 2022-04-24 RX ORDER — SENNA PLUS 8.6 MG/1
2 TABLET ORAL AT BEDTIME
Refills: 0 | Status: DISCONTINUED | OUTPATIENT
Start: 2022-04-24 | End: 2022-04-25

## 2022-04-24 RX ORDER — BENZOCAINE AND MENTHOL 5; 1 G/100ML; G/100ML
1 LIQUID ORAL ONCE
Refills: 0 | Status: COMPLETED | OUTPATIENT
Start: 2022-04-24 | End: 2022-04-24

## 2022-04-24 RX ADMIN — Medication 30 MILLIGRAM(S): at 21:40

## 2022-04-24 RX ADMIN — PANTOPRAZOLE SODIUM 40 MILLIGRAM(S): 20 TABLET, DELAYED RELEASE ORAL at 05:29

## 2022-04-24 RX ADMIN — Medication 30 MILLIGRAM(S): at 21:25

## 2022-04-24 RX ADMIN — Medication 325 MILLIGRAM(S): at 11:50

## 2022-04-24 RX ADMIN — Medication 650 MILLIGRAM(S): at 10:20

## 2022-04-24 RX ADMIN — POLYETHYLENE GLYCOL 3350 17 GRAM(S): 17 POWDER, FOR SOLUTION ORAL at 05:29

## 2022-04-24 RX ADMIN — BENZOCAINE AND MENTHOL 1 LOZENGE: 5; 1 LIQUID ORAL at 15:08

## 2022-04-24 RX ADMIN — Medication 650 MILLIGRAM(S): at 08:45

## 2022-04-24 RX ADMIN — POLYETHYLENE GLYCOL 3350 17 GRAM(S): 17 POWDER, FOR SOLUTION ORAL at 17:06

## 2022-04-24 RX ADMIN — SENNA PLUS 2 TABLET(S): 8.6 TABLET ORAL at 21:17

## 2022-04-24 RX ADMIN — Medication 2000 UNIT(S): at 11:51

## 2022-04-24 RX ADMIN — Medication 1 PACKET(S): at 10:23

## 2022-04-24 NOTE — PATIENT PROFILE ADULT - FUNCTIONAL ASSESSMENT - DAILY ACTIVITY SECTION LABEL
CT surgery consult r/o deterioration of the leakage of Aortic aneurysm  CE x3. cardiac monitor . CT surgery consult r/o deterioration of the leakage of Aortic aneurysm  CE x3. cardiac monitor. Echo cardiogram

## 2022-04-24 NOTE — PROGRESS NOTE ADULT - PROBLEM SELECTOR PLAN 1
acute on chronic worsening, c/f uterine fibroid vs malignancy vs endometriosis vs PID  CT abd showing enlarged leiomyomatous uterus and indeterminant liver lesion  US shows Uterus: 13.8 x 11.2 x 9.7 cm. Multiple leiomyomata, largest fundal, intramural 4.6 x 3.6 x 3.9 cm. 3-4 cm leiomyoma appears to be submucosal, Endometrium: 8 mm. Within normal limits. The ovaries were not visualized.  LFTs and Lipase wnl  - admit to medicine  - pain control tylenol, ketorolac, morphine  - f/u tumor markers  - liver lesion and pancreatic lesion being followed by o/p GI, with plans for o/p MRI    PAIN MANAGEMENT consulted    GYN consulted by ED  -per Dr. Hill, no acute GYN intervention at this time, gyn etiology of fever unlikely   -pt will need a hysterectomy for fibroid uterus which can be set up outpatient  -pt agrees and understands plan of care   -case d/w Dr. Borja   -pt evaluated at bedsidei with beau Lopez attending

## 2022-04-24 NOTE — DISCHARGE NOTE PROVIDER - CARE PROVIDER_API CALL
Kael Melendez)  Internal Medicine  [address] [facility], [address]rd [place] [address]  Phone: [phone]  Fax: [phone]  Follow Up Time:     Bulmaro Hart)  OBSGYN  Oncology  [address] [place] [address]  Phone: [phone]  Fax: [phone])-  Follow Up Time:     Cleveland Estrada)  Gastroenterology  [address] [facility], [address], [place] [address]  Phone: [phone]  Fax: [phone]  Follow Up Time:

## 2022-04-24 NOTE — DISCHARGE NOTE PROVIDER - NSDCPNSUBOBJ_GEN_ALL_CORE
45 yo F w/ Hx uterine fibroids, symptomatic anemia 2/2 heavy menstrual bleeding, asthma, pancreatic lesion (seen on CT 04/23), Lt ovarian lesion, IBS and R fibular fracture July 2020 s/p ORIF p/w acute worsening of her chronic LLQ abdominal pain associated with subjective fever and yellowish vaginal discharge x1 week. She denies any chills, nausea, vomiting, diarrhea, urinary discomfort. She does c/o mild sore throat, but no shortness of beath, chest pain, cough, nasal stuffiness or other complaints. LMP 4/7/22, denies being sexually active.   Of note, patient tested positive for Gardnerella on March 1st and was treated with vaginal metronidazole. Patient said that she had whitish creamy foul smelling discharge at that time .     Patient still has some abdominal pain but not excruciating, has been afebrile since admission.         A/P:  #Intractable abdominal pain   #Uterine Fibroids  #Microcytic Anemia   #Pancreatic Lesion  #Indeterminate Liver Lesion   #Left Ovarian lesion    #Constipation    Plan:  -Pt p/w intractable abdominal pain, CT abdomen showed large uterine fibroid. Pain is likely secondary to fibroid uterus. Recent out-patient MR pelvis showed large leiomyoma along with left ovarian lesion.  elevated, 203. Awaiting to be scheduled for out-patient hysterectomy- TLH-BS and left oophorectomy. Out-patient GYN Dr. Karen Rebolledo. Patient advised that she would need to get her surgery scheduled sooner as her symptoms are secondary to large fibroid uterus.   -There was initial concern for an infectious etiology as patient had fever 101in ED, was monitored off abx. She has been afebrile since then, no leukocytosis. UA, Ucx, blood cx all NTD. Also discussed with GYN PA, there was no cervical motion tenderness on vaginal exam and doubt that she has PID. Even though not sexually active, REMI for Chlamydia and Gonorrhea sent which is testing.   -She was also informed about ovarian lesion and Ca 125 elevated levels, she is aware of the results from out-patient providers. She was educated that she is scheduled to have left oophorectomy with hysterectomy. Definitive diagnosis of ovarian mass to be made based on bx results. F/u with GYN   -Pt was also scheduled to get MR abdomen as out-patient, since patient is medically to get discharged will re-schedule MR abdomen for Wednesday. Appointment made for 8:30 am on Wednesday 4/27 at [facility]. Out-patient f/u with Dr. Natalie jordan results   -Stool softeners and enema given for constipation.   -Liver lesion to be evaluated on MR as well, with GI f/u out-patient

## 2022-04-24 NOTE — PROGRESS NOTE ADULT - PROBLEM SELECTOR PLAN 3
Hx of IBS mostly constipation  CT Abd/Pelvis - ind. liver lesion, leiomyomatous uterus  c/w bowel regimen  outpatient GI (Dr. Estrada)

## 2022-04-24 NOTE — DISCHARGE NOTE PROVIDER - NSDCMRMEDTOKEN_GEN_ALL_CORE_FT
albuterol 2.5 mg/3 mL (0.083%) inhalation solution: 3 milliliter(s) inhaled every 6 hours, As Needed  albuterol 90 mcg/inh inhalation powder: 2 puff(s) inhaled every 4 hours  ferrous sulfate 325 mg (65 mg elemental iron) oral tablet: 1 tab(s) orally once a day  Tylenol 500 mg oral tablet: 2 tab(s) orally every 6 hours, As Needed  Vitamin D3 50,000 intl units (1250 mcg) oral capsule: 1 cap(s) orally once a week   acetaminophen 500 mg oral tablet: 2 tab(s) orally every 6 hours  albuterol 90 mcg/inh inhalation aerosol: 2 puff(s) inhaled every 6 hours, As needed, Shortness of Breath and/or Wheezing  bisacodyl 10 mg rectal suppository: 1 suppository(ies) rectal once a day, As needed, Constipation  ferrous sulfate 325 mg (65 mg elemental iron) oral tablet: 1 tab(s) orally once a day  polyethylene glycol 3350 oral powder for reconstitution: 17 gram(s) orally once a day   senna oral tablet: 2 tab(s) orally once a day (at bedtime)  Vitamin D3 50,000 intl units (1250 mcg) oral capsule: 1 cap(s) orally once a week

## 2022-04-24 NOTE — DISCHARGE NOTE PROVIDER - NSDCFUSCHEDAPPT_GEN_ALL_CORE_FT
[facility] ; 04/25/2022 ; [facility] PreAdmits  [facility] ; 04/25/2022 ; NPP INTMED [address] [facility] ; 05/05/2022 ; NPP Gastro [address] [facility] ; 04/25/2022 ; [facility] PreAdmits  [facility] ; 04/25/2022 ; NPP INTMED [address] [facility] ; 04/27/2022 ; NPP MRI [address]  [facility] ; 05/05/2022 ; NPP Gastro [address] [facility] ; 04/25/2022 ; NPP INTMED [address] [facility] ; 04/27/2022 ; NPP MRI [address]  [facility] ; 05/05/2022 ; NPP Gastro [address] [facility] ; 04/27/2022 ; NPP MRI [address]  [facility] ; 05/05/2022 ; NPP Gastro [address] [facility]

## 2022-04-24 NOTE — DISCHARGE NOTE PROVIDER - PROVIDER TOKENS
PROVIDER:[TOKEN:[[id number]]],PROVIDER:[TOKEN:[[id number]]],PROVIDER:[TOKEN:[[id number]]]

## 2022-04-24 NOTE — DISCHARGE NOTE PROVIDER - HOSPITAL COURSE
46 y.o. female LMP 2 weeks, pt was told with poss Lt ovarian ca 1-2 mos ago, has not seen gyn oncology.  Pt also with h/o uterine fibroid, no intervention yet.  Pt c/o on & off Lt lower pelvic pain for 3 days radiated to LUQ, subjective fever, chills, HA, photophobia, not feeling hungry, yellowish vag. discharge, last BM 2 weeks ago, pt's passing gas, no nausea, vomiting, dysuria, pt took Motrin with no relief.  Pt has similar pain in the past.  Pt's vaccinated for COVID 46 y.o. F with PMH of uterine fibroids, symptomatic anemia 2/2 heavy menstrual bleeding, asthma, IBS, R fibular fx 7/2020, admitted for abdominal pain. Patient underwent  CT a/p which had enlarged leiomyomatous uterus, US pelvix showed Uterus: 13.8 x 11.2 x 9.7 cm. Multiple leiomyomata, largest fundal, intramural 4.6 x 3.6 x 3.9 cm. 3-4 cm leiomyoma appears to be submucosal, Endometrium: 8 mm. Within normal limits. The ovaries were not visualized.  elevated  to 101, LFTs and Lipase wnl, Patient was seen by Gyn who recommended outpatient TLH-BS and left oophorectomy with her outpatient GYN Dr. Karen Rebolledo. Patient also noted with Subcentimeter hypodensity in the pancreatic body on CT angio chest in 3/2022 which was not visualized on repeat CT abdomen on this admission. Patient to be followed up by outpatient GI Dr. Estrada and outpatient MR Abdomen [facility]. Patient had one episode of fever to 101.2F however infectious workup negative, BCx ngtd, UCx no growth, lactate wnl. Patient also noted with constipation, started on miralax, senna and bisacodyl suppository. Patient with anemia most likely 2/2 acute blood loss in the setting of heavy menstrual bleeding, Hb stable around 8.5, improved to 9.9 prior to discharge. Patient was deemed medically stable for discharge by primary team.

## 2022-04-24 NOTE — PATIENT PROFILE ADULT - FALL HARM RISK - HARM RISK INTERVENTIONS

## 2022-04-24 NOTE — DISCHARGE NOTE PROVIDER - CARE PROVIDERS DIRECT ADDRESSES
,[email or web address],DirectAddress_Unknown

## 2022-04-24 NOTE — PROGRESS NOTE ADULT - ATTENDING COMMENTS
1 45 yo F w/ Hx uterine fibroids, symptomatic anemia 2/2 heavy menstrual bleeding, asthma, pancreatic lesion (seen on CT 04/23), Lt ovarian lesion, IBS and R fibular fracture July 2020 s/p ORIF p/w acute worsening of her chronic LLQ abdominal pain associated with subjective fever and yellowish vaginal discharge x1 week. She denies any chills, nausea, vomiting, diarrhea, urinary discomfort. She does c/o mild sore throat, but no shortness of beath, chest pain, cough, nasal stuffiness or other complaints. LMP 4/7/22, denies being sexually active.   Of note, patient tested positive for Gardnerella on March 1st and was treated with vaginal metronidazole. Patient said that she had whitish creamy foul smelling discharge at that time which resolved and this yellowish discharge is new. She denies any vaginal pain, pruritis or foul smell from discharge.     Labs reviewed    PE as above    A/P:  #Intractable abdominal pain with Fever   #Uterine Fibroids  #Microcytic Anemia   #Pancreatic Lesion  #Left Ovarian lesion    #Constipation    Plan:  -Pt p/w abdominal pain, fever (101 in ED), new yellowish vaginal discharge. S/p Metronidazole cream x1 month ago for Bacterial vaginosis. CT abdomen: uterine fibroids. US pelvis: uterine fibroids, otherwise unremarkable. DDx include PID, pain 2/2 uterine fibroid.   -Discussed with GYN PA, was informed that no tenderness or bleed was noted on vaginal exam. Will obtain panel for vaginitis (Gardnerella, Trichomonas, Candida), also check Chlamydia, Neisseria. No further fevers since last night, no leukocytosis. Follow blood cx. Will monitor off abx for now, may add abx if continues to spike temp.   -Pt with large uterine fibroid, recent MR pelvis showed large leiomyoma along with left ovarian lesion.  elevated 101. Awaiting to be scheduled for out-patient hysterectomy- TLH-BS and left oophorectomy. Out-patient GYN Dr. Karen Rebolledo   -CT from [facility] showed and incidental finding of,  Subcentimeter hypodensity in the pancreatic body, possibly a pseudocyst versus intraductal papillary mucinous neoplasm. Was originally scheduled to get MR abdomen as out-patient on 4/25. Will get while patient is in hospital.   -Reports last BM 2 weeks ago, add stool softeners.   -H/H stable

## 2022-04-24 NOTE — DISCHARGE NOTE PROVIDER - NSDCCPCAREPLAN_GEN_ALL_CORE_FT
PRINCIPAL DISCHARGE DIAGNOSIS  Diagnosis: Abdominal pain  Assessment and Plan of Treatment:       SECONDARY DISCHARGE DIAGNOSES  Diagnosis: Uterine leiomyoma  Assessment and Plan of Treatment:     Diagnosis: IBS (irritable bowel syndrome)  Assessment and Plan of Treatment:     Diagnosis: Fever  Assessment and Plan of Treatment:     Diagnosis: Vaginal discharge  Assessment and Plan of Treatment:     Diagnosis: Asthma  Assessment and Plan of Treatment:      PRINCIPAL DISCHARGE DIAGNOSIS  Diagnosis: Abdominal pain  Assessment and Plan of Treatment:       SECONDARY DISCHARGE DIAGNOSES  Diagnosis: Uterine leiomyoma  Assessment and Plan of Treatment:     Diagnosis: IBS (irritable bowel syndrome)  Assessment and Plan of Treatment:      PRINCIPAL DISCHARGE DIAGNOSIS  Diagnosis: Abdominal pain  Assessment and Plan of Treatment:       SECONDARY DISCHARGE DIAGNOSES  Diagnosis: Uterine leiomyoma  Assessment and Plan of Treatment:     Diagnosis: IBS (irritable bowel syndrome)  Assessment and Plan of Treatment:     Diagnosis: Anemia  Assessment and Plan of Treatment:     Diagnosis: Pancreatic lesion  Assessment and Plan of Treatment:     Diagnosis: Elevated cancer antigen 125 (CA-125)  Assessment and Plan of Treatment:      PRINCIPAL DISCHARGE DIAGNOSIS  Diagnosis: Abdominal pain  Assessment and Plan of Treatment: You were admitted to the hospital with abdominal pain and you had a CT scan of your abdomen which showed an enlarged uterus which was consistent with your ultrasound of your pelvis. You were evaluated by gynecology who recommended outpatient hysterectomy, and are recommended to follow up with your outpatient GYN Dr. Rebolledo.      SECONDARY DISCHARGE DIAGNOSES  Diagnosis: Uterine leiomyoma  Assessment and Plan of Treatment: You were admitted to the hospital with abdominal pain and you had a CT scan of your abdomen which showed an enlarged uterus which was consistent with your ultrasound of your pelvis. You were evaluated by gynecology who recommended outpatient hysterectomy, and are recommended to follow up with your outpatient GYN Dr. Rebolledo.    Diagnosis: IBS (irritable bowel syndrome)  Assessment and Plan of Treatment: You have history of IBS and were started on bowel regimen to promote bowel movements and avoid constipation. You are recommended to follow up with your primary care provider within 1-2 weeks for further care.    Diagnosis: Anemia  Assessment and Plan of Treatment: You have history of anemia most likely secondary to heavy menstrual bleeding and are recommedned to continue oral iron tablets. You are recommended to follow up with your primary care provider within 1-2 weeks for further care.    Diagnosis: Pancreatic lesion  Assessment and Plan of Treatment: You were found to have a pancreatic lesion on your CT scan in March and recommended to have an MRI of your abdomen for further care. You have an appointment scheduled on Wed 4/27 at 8am for an MRI of your abdomen. You are recommended to follow up with Dr. Estrada, gastroenterologist for further management,.     PRINCIPAL DISCHARGE DIAGNOSIS  Diagnosis: Abdominal pain  Assessment and Plan of Treatment: You were admitted to the hospital with abdominal pain and you had a CT scan of your abdomen which showed an enlarged uterus which was consistent with your ultrasound of your pelvis. You were evaluated by gynecology who recommended outpatient hysterectomy, and are recommended to follow up with your outpatient GYN Dr. Rebolledo.      SECONDARY DISCHARGE DIAGNOSES  Diagnosis: Uterine leiomyoma  Assessment and Plan of Treatment: You were admitted to the hospital with abdominal pain and you had a CT scan of your abdomen which showed an enlarged uterus which was consistent with your ultrasound of your pelvis. You were evaluated by gynecology who recommended outpatient hysterectomy, and are recommended to follow up with your outpatient GYN Dr. Rebolledo.    Diagnosis: IBS (irritable bowel syndrome)  Assessment and Plan of Treatment: You have history of IBS and were started on bowel regimen to promote bowel movements and avoid constipation. You are recommended to follow up with your primary care provider within 1-2 weeks for further care.    Diagnosis: Anemia  Assessment and Plan of Treatment: You have history of anemia most likely secondary to heavy menstrual bleeding and are recommedned to continue oral iron tablets. You are recommended to follow up with your primary care provider within 1-2 weeks for further care.    Diagnosis: Pancreatic lesion  Assessment and Plan of Treatment: You were found to have a pancreatic lesion on your CT scan in March and recommended to have an MRI of your abdomen for further care. You have an appointment scheduled on Wed 4/27 at 8am for an MRI of your abdomen. You are recommended to follow up with Dr. Estrada, gastroenterologist for further management,.    Diagnosis: Ovarian mass, left  Assessment and Plan of Treatment: You were found to have a left ovarian lesion on out-patient MR pelvis. US in the [facility] did not visualise ovaries. Ovarian tumor marker was elevated to 201. You are adbised to f/u with GYN, need to be scheduled for hystrectomy and left oophrectomy (ovary rmoval). Biopsy needs to be followed after removal for definitive diagnosis

## 2022-04-24 NOTE — PROGRESS NOTE ADULT - SUBJECTIVE AND OBJECTIVE BOX
PGY-1 Progress Note discussed with attending    PAGER #: [[phone]] TILL 5:00 PM  PLEASE CONTACT ON CALL TEAM:  - On Call Team (Please refer to Callum) FROM 5:00 PM - 8:30PM  - Nightfloat Team FROM 8:30 -7:30 AM    CHIEF COMPLAINT & BRIEF HOSPITAL COURSE:    47 yo F w/ Hx uterine fibroids, symptomatic anemia 2/2 heavy menstrual bleeding, pre-syncope, asthma, pancreatic lesion, IBS and R fibular fracture July 2020 s/p ORIF who is coming in with acute worsening of her chronic abdominal pain. Ms. Cowan says that over the last 3 days she has had severe worsening of her abdominal pain, 10/10 in her LLQ and suprapubic area with chronic yellow discharge and subjective fevers. Also reports constipation with no proper bowel movement in 2 weeks. Is being followed as o/p w/ ob-gyn w/ elevated CA-125 and plan for hysterectomy. Was seen at [facility] March 2022 for SOB, chest pain and heavy menstrual bleeding w/ Hb 10.5 where they did a CTA chest which showed lesions suspicious for malignancy (sub-centimeter hypodense pancreatic lesion, sclerotic lesion in T7) w/ recs to emergently follow up with PMD.     In ED Gyn was consulted who rec outpatient hysterectomy.    INTERVAL HPI/OVERNIGHT EVENTS:     Patient was examined at bedside, AAOx3, stable, NAD. Still has LLQ abd pain and tenderness and constipation. She prefers to take Tylenol for pain and nothing stronger if she can tolerate it. She was seeing GYN (Dr. Hart) and Gastro (Dr. Estrada) as outpatient. She was scheduled for outpatient MR Abd/Pelvis alison. (4/25/22). She also reported yellowish vaginal discharge. She was recently treated in the past 3-4 weeks for UTI and completed course of antibiotics. Vaginitis panel w/ Candida was ordered. No acute events overnight.    REVIEW OF SYSTEMS:  CONSTITUTIONAL: No fever, weight loss, or fatigue  RESPIRATORY: No cough, wheezing, chills or hemoptysis; No shortness of breath  CARDIOVASCULAR: No chest pain, palpitations, dizziness, or leg swelling  GASTROINTESTINAL: (+) lower abdominal pain. No nausea, vomiting, or hematemesis; No diarrhea or constipation. No melena or hematochezia.  GENITOURINARY: (+) vaginal discharge. No dysuria or hematuria, urinary frequency  NEUROLOGICAL: No headaches, memory loss, loss of strength, numbness, or tremors  SKIN: No itching, burning, rashes, or lesions     MEDICATIONS  (STANDING):  cholecalciferol 2000 Unit(s) Oral daily  ferrous    sulfate 325 milliGRAM(s) Oral daily  pantoprazole    Tablet 40 milliGRAM(s) Oral before breakfast  polyethylene glycol 3350 17 Gram(s) Oral two times a day  senna 2 Tablet(s) Oral at bedtime    MEDICATIONS  (PRN):  acetaminophen     Tablet .. 650 milliGRAM(s) Oral every 6 hours PRN Temp greater or equal to 38C (100.4F), Mild Pain (1 - 3)  ALBUTerol    90 MICROgram(s) HFA Inhaler 2 Puff(s) Inhalation every 6 hours PRN Shortness of Breath and/or Wheezing  bisacodyl Suppository 10 milliGRAM(s) Rectal daily PRN Constipation  ketorolac   Injectable 30 milliGRAM(s) IV Push every 6 hours PRN Moderate Pain (4 - 6)  morphine  - Injectable 2 milliGRAM(s) IV Push every 6 hours PRN Severe Pain (7 - 10)      Vital Signs Last 24 Hrs  T(C): 37.7 (24 Apr 2022 08:36), Max: 38.4 (23 Apr 2022 15:55)  T(F): 99.9 (24 Apr 2022 08:36), Max: 101.2 (23 Apr 2022 15:55)  HR: 85 (24 Apr 2022 08:36) (78 - 100)  BP: 106/67 (24 Apr 2022 08:36) (93/57 - 136/77)  BP(mean): --  RR: 16 (24 Apr 2022 08:36) (16 - 19)  SpO2: 95% (24 Apr 2022 08:36) (95% - 98%)    PHYSICAL EXAMINATION:  GENERAL: NAD, well built  HEAD:  Atraumatic, Normocephalic  EYES:  conjunctiva and sclera clear  NECK: Supple, No JVD, Normal thyroid  CHEST/LUNG: Clear to auscultation. Clear to percussion bilaterally; No rales, rhonchi, wheezing, or rubs  HEART: Regular rate and rhythm; No murmurs, rubs, or gallops  ABDOMEN: Soft, (+) LLQ tenderness, Nondistended; Bowel sounds present, no pain or masses on palpation  NERVOUS SYSTEM:  Alert & Oriented X3  : voiding well  EXTREMITIES:  2+ Peripheral Pulses, No clubbing, cyanosis, or edema  SKIN: warm dry                          8.3    4.69  )-----------( 201      ( 24 Apr 2022 06:43 )             27.5     04-24    139  |  110<H>  |  5<L>  ----------------------------<  89  3.8   |  25  |  0.55    Ca    9.0      24 Apr 2022 06:43  Phos  2.3     04-24  Mg     1.8     04-24    TPro  5.7<L>  /  Alb  2.7<L>  /  TBili  0.3  /  DBili  x   /  AST  22  /  ALT  25  /  AlkPhos  38<L>  04-24    LIVER FUNCTIONS - ( 24 Apr 2022 06:43 )  Alb: 2.7 g/dL / Pro: 5.7 g/dL / ALK PHOS: 38 U/L / ALT: 25 U/L DA / AST: 22 U/L / GGT: x                   I&O's Summary          CAPILLARY BLOOD GLUCOSE      RADIOLOGY & ADDITIONAL TESTS:                   PGY-1 Progress Note discussed with attending    PAGER #: [[phone]] TILL 5:00 PM  PLEASE CONTACT ON CALL TEAM:  - On Call Team (Please refer to Calulm) FROM 5:00 PM - 8:30PM  - Nightfloat Team FROM 8:30 -7:30 AM    CHIEF COMPLAINT & BRIEF HOSPITAL COURSE:    45 yo F w/ Hx uterine fibroids, symptomatic anemia 2/2 heavy menstrual bleeding, pre-syncope, asthma, pancreatic lesion, IBS and R fibular fracture July 2020 s/p ORIF who is coming in with acute worsening of her chronic abdominal pain. Ms. Cowan says that over the last 3 days she has had severe worsening of her abdominal pain, 10/10 in her LLQ and suprapubic area with chronic yellow discharge and subjective fevers. Also reports constipation with no proper bowel movement in 2 weeks. Is being followed as o/p w/ ob-gyn w/ elevated CA-125 and plan for hysterectomy. Was seen at [facility] March 2022 for SOB, chest pain and heavy menstrual bleeding w/ Hb 10.5 where they did a CTA chest which showed lesions suspicious for malignancy (sub-centimeter hypodense pancreatic lesion, sclerotic lesion in T7) w/ recs to emergently follow up with PMD.     In ED Gyn was consulted who rec outpatient hysterectomy.    INTERVAL HPI/OVERNIGHT EVENTS:     Patient was examined at bedside, AAOx3, stable, NAD. Still has LLQ abd pain and tenderness and constipation. She prefers to take Tylenol for pain and nothing stronger if she can tolerate it. She was seeing GYN (Dr. Hart) and Gastro (Dr. Estrada) as outpatient. She was scheduled for outpatient MR Abd/Pelvis alison. (4/25/22). She also reported yellowish vaginal discharge. She was recently treated in the past 3-4 weeks for UTI and completed course of antibiotics. Vaginitis panel w/ Candida was ordered. No acute events overnight.    REVIEW OF SYSTEMS:  CONSTITUTIONAL: No fever, weight loss, or fatigue  RESPIRATORY: No cough, wheezing, chills or hemoptysis; No shortness of breath  CARDIOVASCULAR: No chest pain, palpitations, dizziness, or leg swelling  GASTROINTESTINAL: (+) lower abdominal pain. No nausea, vomiting, or hematemesis; No diarrhea or constipation. No melena or hematochezia.  GENITOURINARY: (+) vaginal discharge. No dysuria or hematuria, urinary frequency  NEUROLOGICAL: No headaches, memory loss, loss of strength, numbness, or tremors  SKIN: No itching, burning, rashes, or lesions     MEDICATIONS  (STANDING):  cholecalciferol 2000 Unit(s) Oral daily  ferrous    sulfate 325 milliGRAM(s) Oral daily  pantoprazole    Tablet 40 milliGRAM(s) Oral before breakfast  polyethylene glycol 3350 17 Gram(s) Oral two times a day  senna 2 Tablet(s) Oral at bedtime    MEDICATIONS  (PRN):  acetaminophen     Tablet .. 650 milliGRAM(s) Oral every 6 hours PRN Temp greater or equal to 38C (100.4F), Mild Pain (1 - 3)  ALBUTerol    90 MICROgram(s) HFA Inhaler 2 Puff(s) Inhalation every 6 hours PRN Shortness of Breath and/or Wheezing  bisacodyl Suppository 10 milliGRAM(s) Rectal daily PRN Constipation  ketorolac   Injectable 30 milliGRAM(s) IV Push every 6 hours PRN Moderate Pain (4 - 6)  morphine  - Injectable 2 milliGRAM(s) IV Push every 6 hours PRN Severe Pain (7 - 10)      Vital Signs Last 24 Hrs  T(C): 37.7 (24 Apr 2022 08:36), Max: 38.4 (23 Apr 2022 15:55)  T(F): 99.9 (24 Apr 2022 08:36), Max: 101.2 (23 Apr 2022 15:55)  HR: 85 (24 Apr 2022 08:36) (78 - 100)  BP: 106/67 (24 Apr 2022 08:36) (93/57 - 136/77)  BP(mean): --  RR: 16 (24 Apr 2022 08:36) (16 - 19)  SpO2: 95% (24 Apr 2022 08:36) (95% - 98%)    PHYSICAL EXAMINATION:  GENERAL: NAD, well built  HEAD:  Atraumatic, Normocephalic  EYES:  conjunctiva and sclera clear  NECK: Supple, No JVD, Normal thyroid  CHEST/LUNG: Clear to auscultation. Clear to percussion bilaterally; No rales, rhonchi, wheezing, or rubs  HEART: Regular rate and rhythm; No murmurs, rubs, or gallops  ABDOMEN: Soft, (+) LLQ tenderness, supra -pubic tenderness,  Nondistended; Bowel sounds present  NERVOUS SYSTEM:  Alert & Oriented X3  : voiding well  EXTREMITIES:  2+ Peripheral Pulses, No clubbing, cyanosis, or edema  SKIN: warm dry                          8.3    4.69  )-----------( 201      ( 24 Apr 2022 06:43 )             27.5     04-24    139  |  110<H>  |  5<L>  ----------------------------<  89  3.8   |  25  |  0.55    Ca    9.0      24 Apr 2022 06:43  Phos  2.3     04-24  Mg     1.8     04-24    TPro  5.7<L>  /  Alb  2.7<L>  /  TBili  0.3  /  DBili  x   /  AST  22  /  ALT  25  /  AlkPhos  38<L>  04-24    LIVER FUNCTIONS - ( 24 Apr 2022 06:43 )  Alb: 2.7 g/dL / Pro: 5.7 g/dL / ALK PHOS: 38 U/L / ALT: 25 U/L DA / AST: 22 U/L / GGT: x                   I&O's Summary          CAPILLARY BLOOD GLUCOSE      RADIOLOGY & ADDITIONAL TESTS:

## 2022-04-25 ENCOUNTER — NON-APPOINTMENT (OUTPATIENT)
Age: 47
End: 2022-04-25

## 2022-04-25 ENCOUNTER — APPOINTMENT (OUTPATIENT)
Dept: INTERNAL MEDICINE | Facility: CLINIC | Age: 47
End: 2022-04-25

## 2022-04-25 ENCOUNTER — TRANSCRIPTION ENCOUNTER (OUTPATIENT)
Age: 47
End: 2022-04-25

## 2022-04-25 VITALS
HEART RATE: 79 BPM | SYSTOLIC BLOOD PRESSURE: 103 MMHG | DIASTOLIC BLOOD PRESSURE: 69 MMHG | TEMPERATURE: 98 F | RESPIRATION RATE: 16 BRPM | OXYGEN SATURATION: 100 %

## 2022-04-25 DIAGNOSIS — R10.9 UNSPECIFIED ABDOMINAL PAIN: ICD-10-CM

## 2022-04-25 DIAGNOSIS — M25.552 PAIN IN LEFT HIP: ICD-10-CM

## 2022-04-25 LAB
ALBUMIN SERPL ELPH-MCNC: 3.1 G/DL — LOW (ref 3.5–5)
ALP SERPL-CCNC: 44 U/L — SIGNIFICANT CHANGE UP (ref 40–120)
ALT FLD-CCNC: 27 U/L DA — SIGNIFICANT CHANGE UP (ref 10–60)
ANION GAP SERPL CALC-SCNC: 5 MMOL/L — SIGNIFICANT CHANGE UP (ref 5–17)
AST SERPL-CCNC: 20 U/L — SIGNIFICANT CHANGE UP (ref 10–40)
BASOPHILS # BLD AUTO: 0.05 K/UL — SIGNIFICANT CHANGE UP (ref 0–0.2)
BASOPHILS NFR BLD AUTO: 0.9 % — SIGNIFICANT CHANGE UP (ref 0–2)
BILIRUB SERPL-MCNC: 0.2 MG/DL — SIGNIFICANT CHANGE UP (ref 0.2–1.2)
BUN SERPL-MCNC: 10 MG/DL — SIGNIFICANT CHANGE UP (ref 7–18)
C TRACH RRNA SPEC QL NAA+PROBE: SIGNIFICANT CHANGE UP
CALCIUM SERPL-MCNC: 10.5 MG/DL — SIGNIFICANT CHANGE UP (ref 8.4–10.5)
CANCER AG125 SERPL-ACNC: 203 U/ML — HIGH
CANCER AG19-9 SERPL-ACNC: 16 U/ML — SIGNIFICANT CHANGE UP
CHLORIDE SERPL-SCNC: 111 MMOL/L — HIGH (ref 96–108)
CO2 SERPL-SCNC: 26 MMOL/L — SIGNIFICANT CHANGE UP (ref 22–31)
CREAT SERPL-MCNC: 0.52 MG/DL — SIGNIFICANT CHANGE UP (ref 0.5–1.3)
EGFR: 116 ML/MIN/1.73M2 — SIGNIFICANT CHANGE UP
EOSINOPHIL # BLD AUTO: 0.15 K/UL — SIGNIFICANT CHANGE UP (ref 0–0.5)
EOSINOPHIL NFR BLD AUTO: 2.7 % — SIGNIFICANT CHANGE UP (ref 0–6)
GLUCOSE SERPL-MCNC: 88 MG/DL — SIGNIFICANT CHANGE UP (ref 70–99)
HCT VFR BLD CALC: 32.8 % — LOW (ref 34.5–45)
HGB BLD-MCNC: 9.9 G/DL — LOW (ref 11.5–15.5)
IMM GRANULOCYTES NFR BLD AUTO: 0.2 % — SIGNIFICANT CHANGE UP (ref 0–1.5)
LYMPHOCYTES # BLD AUTO: 1.75 K/UL — SIGNIFICANT CHANGE UP (ref 1–3.3)
LYMPHOCYTES # BLD AUTO: 31.8 % — SIGNIFICANT CHANGE UP (ref 13–44)
MAGNESIUM SERPL-MCNC: 2 MG/DL — SIGNIFICANT CHANGE UP (ref 1.6–2.6)
MCHC RBC-ENTMCNC: 24.1 PG — LOW (ref 27–34)
MCHC RBC-ENTMCNC: 30.2 GM/DL — LOW (ref 32–36)
MCV RBC AUTO: 80 FL — SIGNIFICANT CHANGE UP (ref 80–100)
MONOCYTES # BLD AUTO: 0.84 K/UL — SIGNIFICANT CHANGE UP (ref 0–0.9)
MONOCYTES NFR BLD AUTO: 15.3 % — HIGH (ref 2–14)
N GONORRHOEA RRNA SPEC QL NAA+PROBE: SIGNIFICANT CHANGE UP
NEUTROPHILS # BLD AUTO: 2.7 K/UL — SIGNIFICANT CHANGE UP (ref 1.8–7.4)
NEUTROPHILS NFR BLD AUTO: 49.1 % — SIGNIFICANT CHANGE UP (ref 43–77)
NRBC # BLD: 0 /100 WBCS — SIGNIFICANT CHANGE UP (ref 0–0)
PHOSPHATE SERPL-MCNC: 2.8 MG/DL — SIGNIFICANT CHANGE UP (ref 2.5–4.5)
PLATELET # BLD AUTO: 222 K/UL — SIGNIFICANT CHANGE UP (ref 150–400)
POTASSIUM SERPL-MCNC: 3.9 MMOL/L — SIGNIFICANT CHANGE UP (ref 3.5–5.3)
POTASSIUM SERPL-SCNC: 3.9 MMOL/L — SIGNIFICANT CHANGE UP (ref 3.5–5.3)
PROT SERPL-MCNC: 6.9 G/DL — SIGNIFICANT CHANGE UP (ref 6–8.3)
RBC # BLD: 4.1 M/UL — SIGNIFICANT CHANGE UP (ref 3.8–5.2)
RBC # FLD: 16.8 % — HIGH (ref 10.3–14.5)
SODIUM SERPL-SCNC: 142 MMOL/L — SIGNIFICANT CHANGE UP (ref 135–145)
VZV IGG FLD QL IA: 960.1 INDEX — SIGNIFICANT CHANGE UP
VZV IGG FLD QL IA: POSITIVE — SIGNIFICANT CHANGE UP
WBC # BLD: 5.5 K/UL — SIGNIFICANT CHANGE UP (ref 3.8–10.5)
WBC # FLD AUTO: 5.5 K/UL — SIGNIFICANT CHANGE UP (ref 3.8–10.5)

## 2022-04-25 PROCEDURE — 82728 ASSAY OF FERRITIN: CPT

## 2022-04-25 PROCEDURE — 83690 ASSAY OF LIPASE: CPT

## 2022-04-25 PROCEDURE — 99285 EMERGENCY DEPT VISIT HI MDM: CPT | Mod: 25

## 2022-04-25 PROCEDURE — 87591 N.GONORRHOEAE DNA AMP PROB: CPT

## 2022-04-25 PROCEDURE — 86703 HIV-1/HIV-2 1 RESULT ANTBDY: CPT

## 2022-04-25 PROCEDURE — 84466 ASSAY OF TRANSFERRIN: CPT

## 2022-04-25 PROCEDURE — 96374 THER/PROPH/DIAG INJ IV PUSH: CPT

## 2022-04-25 PROCEDURE — 86304 IMMUNOASSAY TUMOR CA 125: CPT

## 2022-04-25 PROCEDURE — 86850 RBC ANTIBODY SCREEN: CPT

## 2022-04-25 PROCEDURE — 83735 ASSAY OF MAGNESIUM: CPT

## 2022-04-25 PROCEDURE — 87086 URINE CULTURE/COLONY COUNT: CPT

## 2022-04-25 PROCEDURE — 85045 AUTOMATED RETICULOCYTE COUNT: CPT

## 2022-04-25 PROCEDURE — 99252 IP/OBS CONSLTJ NEW/EST SF 35: CPT

## 2022-04-25 PROCEDURE — 74177 CT ABD & PELVIS W/CONTRAST: CPT | Mod: MA

## 2022-04-25 PROCEDURE — 86901 BLOOD TYPING SEROLOGIC RH(D): CPT

## 2022-04-25 PROCEDURE — 84100 ASSAY OF PHOSPHORUS: CPT

## 2022-04-25 PROCEDURE — 76856 US EXAM PELVIC COMPLETE: CPT

## 2022-04-25 PROCEDURE — 81003 URINALYSIS AUTO W/O SCOPE: CPT

## 2022-04-25 PROCEDURE — 83010 ASSAY OF HAPTOGLOBIN QUANT: CPT

## 2022-04-25 PROCEDURE — 76830 TRANSVAGINAL US NON-OB: CPT

## 2022-04-25 PROCEDURE — 96375 TX/PRO/DX INJ NEW DRUG ADDON: CPT

## 2022-04-25 PROCEDURE — 99239 HOSP IP/OBS DSCHRG MGMT >30: CPT

## 2022-04-25 PROCEDURE — 80053 COMPREHEN METABOLIC PANEL: CPT

## 2022-04-25 PROCEDURE — 71045 X-RAY EXAM CHEST 1 VIEW: CPT

## 2022-04-25 PROCEDURE — 0225U NFCT DS DNA&RNA 21 SARSCOV2: CPT

## 2022-04-25 PROCEDURE — 86787 VARICELLA-ZOSTER ANTIBODY: CPT

## 2022-04-25 PROCEDURE — 83540 ASSAY OF IRON: CPT

## 2022-04-25 PROCEDURE — 86900 BLOOD TYPING SEROLOGIC ABO: CPT

## 2022-04-25 PROCEDURE — 85025 COMPLETE CBC W/AUTO DIFF WBC: CPT

## 2022-04-25 PROCEDURE — 86301 IMMUNOASSAY TUMOR CA 19-9: CPT

## 2022-04-25 PROCEDURE — 96376 TX/PRO/DX INJ SAME DRUG ADON: CPT

## 2022-04-25 PROCEDURE — 87491 CHLMYD TRACH DNA AMP PROBE: CPT

## 2022-04-25 PROCEDURE — 87040 BLOOD CULTURE FOR BACTERIA: CPT

## 2022-04-25 PROCEDURE — 87635 SARS-COV-2 COVID-19 AMP PRB: CPT

## 2022-04-25 PROCEDURE — 93005 ELECTROCARDIOGRAM TRACING: CPT

## 2022-04-25 PROCEDURE — 36415 COLL VENOUS BLD VENIPUNCTURE: CPT

## 2022-04-25 PROCEDURE — 84702 CHORIONIC GONADOTROPIN TEST: CPT

## 2022-04-25 PROCEDURE — 83605 ASSAY OF LACTIC ACID: CPT

## 2022-04-25 PROCEDURE — 82962 GLUCOSE BLOOD TEST: CPT

## 2022-04-25 PROCEDURE — 83550 IRON BINDING TEST: CPT

## 2022-04-25 PROCEDURE — 99221 1ST HOSP IP/OBS SF/LOW 40: CPT

## 2022-04-25 RX ORDER — SENNA PLUS 8.6 MG/1
2 TABLET ORAL
Qty: 60 | Refills: 0
Start: 2022-04-25 | End: 2022-05-24

## 2022-04-25 RX ORDER — FERROUS SULFATE 325(65) MG
1 TABLET ORAL
Qty: 0 | Refills: 0 | DISCHARGE

## 2022-04-25 RX ORDER — ACETAMINOPHEN 500 MG
1000 TABLET ORAL EVERY 6 HOURS
Refills: 0 | Status: DISCONTINUED | OUTPATIENT
Start: 2022-04-25 | End: 2022-04-25

## 2022-04-25 RX ORDER — OXYCODONE HYDROCHLORIDE 5 MG/1
5 TABLET ORAL EVERY 4 HOURS
Refills: 0 | Status: DISCONTINUED | OUTPATIENT
Start: 2022-04-25 | End: 2022-04-25

## 2022-04-25 RX ORDER — CHOLECALCIFEROL (VITAMIN D3) 125 MCG
1 CAPSULE ORAL
Qty: 0 | Refills: 0 | DISCHARGE

## 2022-04-25 RX ORDER — ACETAMINOPHEN 500 MG
2 TABLET ORAL
Qty: 0 | Refills: 0 | DISCHARGE
Start: 2022-04-25

## 2022-04-25 RX ORDER — POLYETHYLENE GLYCOL 3350 17 G/17G
17 POWDER, FOR SOLUTION ORAL
Qty: 510 | Refills: 0
Start: 2022-04-25 | End: 2022-05-24

## 2022-04-25 RX ORDER — ALBUTEROL 90 UG/1
2 AEROSOL, METERED ORAL
Qty: 0 | Refills: 0 | DISCHARGE
Start: 2022-04-25

## 2022-04-25 RX ORDER — ACETAMINOPHEN 500 MG
2 TABLET ORAL
Qty: 0 | Refills: 0 | DISCHARGE

## 2022-04-25 RX ORDER — ALBUTEROL 90 UG/1
3 AEROSOL, METERED ORAL
Qty: 0 | Refills: 0 | DISCHARGE

## 2022-04-25 RX ORDER — FERROUS SULFATE 325(65) MG
1 TABLET ORAL
Qty: 0 | Refills: 0 | DISCHARGE
Start: 2022-04-25

## 2022-04-25 RX ADMIN — Medication 325 MILLIGRAM(S): at 11:44

## 2022-04-25 RX ADMIN — Medication 2000 UNIT(S): at 11:44

## 2022-04-25 RX ADMIN — Medication 10 MILLIGRAM(S): at 11:45

## 2022-04-25 RX ADMIN — Medication 1000 MILLIGRAM(S): at 11:43

## 2022-04-25 RX ADMIN — PANTOPRAZOLE SODIUM 40 MILLIGRAM(S): 20 TABLET, DELAYED RELEASE ORAL at 05:28

## 2022-04-25 RX ADMIN — POLYETHYLENE GLYCOL 3350 17 GRAM(S): 17 POWDER, FOR SOLUTION ORAL at 05:22

## 2022-04-25 NOTE — CONSULT NOTE ADULT - ASSESSMENT
Confidential Drug Utilization Report  Search Terms: Yana Cowan, 1975Search Date: 04/25/2022 11:01:30 AM  The Drug Utilization Report below displays all of the controlled substance prescriptions, if any, that your patient has filled in the last twelve months. The information displayed on this report is compiled from pharmacy submissions to the Department, and accurately reflects the information as submitted by the pharmacies.    This report was requested by: Kathleen Sanders | Reference #: [id number]    There are no results for the search terms that you entered.
A/p: 45 y/o female presents to ED with left lower quadrant pain x3 days and fever, known fibroid uterus; Fever of unknown origin  -per Dr. Hill, no acute GYN intervention at this time, gyn etiology of fever unlikely   -pt will need a hysterectomy for fibroid uterus which can be set up outpatient  -pt agrees and understands plan of care   -case d/w Dr. Borja   -pt evaluated at bedsidei with Dr. Hill, house attending

## 2022-04-25 NOTE — DISCHARGE NOTE NURSING/CASE MANAGEMENT/SOCIAL WORK - NSDCPEFALRISK_GEN_ALL_CORE
For information on Fall & Injury Prevention, visit: [email or web address] OR  [email or web address] OR  https://www.cdc.gov/steadi/patient.html

## 2022-04-25 NOTE — CONSULT NOTE ADULT - SUBJECTIVE AND OBJECTIVE BOX
Source of information: RUBY HILLS, Chart review  Patient language: English  : n/a    HPI:  45 yo F w/ Hx uterine fibroids, symptomatic anemia 2/2 heavy menstrual bleeding, pre-syncope, asthma, pancreatic lesion, IBS and R fibular fracture 2020 s/p ORIF who is coming in with acute worsening of her chronic abdominal pain. Ms. Hills says that over the last 3 days she has had severe worsening of her abdominal pain, 10/10 in her LLQ and suprapubic area with chronic yellow discharge and subjective fevers. Also reports constipation with no proper bowel movement in 2 weeks. Is being followed as o/p w/ ob-gyn w/ elevated CA-125 and plan for hysterectomy. Was seen at [facility] 2022 for SOB, chest pain and heavy menstrual bleeding w/ Hb 10.5 where they did a CTA chest which showed lesions suspicious for malignancy (sub-centimeter hypodense pancreatic lesion, sclerotic lesion in T7) w/ recs to emergently follow up with PMD.     In ED Gyn was consulted who rec outpatient hysterectomy. (2022 22:00)    Pain consulted for abdominal and pelvic pain. Pt seen and examined at bedside. Reports having abdominal and pelvic pain, greatest over left side for several years, worsening over the past 6-7months. Pain associated with heavy menstrual periods, which patient states lasts 1-2months. Reports experiencing spotting for 4d- 1week max prior to return of vaginal bleeding. Awaiting to be scheduled for a hysterectomy. Reports taking Motrin and Tylenol at home for pain. Currently rating left lower quadrant and pelvic pain score 7/10 SCALE USED: (1-10 VNRS). Pt describes pain as aching, tightness, sharp, radiating throughout left lower quadrant and pelvis, alleviated by pain medication, exacerbated by movement. Pain is associated with fullness feeling, frequent urination. Pt also reports poor appetite.  Denies chest pain, SOB, nausea, vomiting. Reports lethargy and constipation, last BM 2 weeks ago. Patient stated goal for pain control: to be able to take deep breaths, get out of bed to chair and ambulate with tolerable pain control. Pt also reports chronic migraines, follows with neurologist OP.     PAST MEDICAL & SURGICAL HISTORY:  Asthma    IBS (irritable bowel syndrome)    Fracture of right fibula    Vitamin D deficiency    Anemia    Uterine leiomyoma    History of ankle surgery        FAMILY HISTORY:       Social History:   [X ] Denies illicit drug use and smoking  [X] social ETOH use few times/ yr    Allergies    No Known Allergies    MEDICATIONS  (STANDING):  acetaminophen     Tablet .. 1000 milliGRAM(s) Oral every 6 hours  bisacodyl Suppository 10 milliGRAM(s) Rectal once  cholecalciferol 2000 Unit(s) Oral daily  ferrous    sulfate 325 milliGRAM(s) Oral daily  pantoprazole    Tablet 40 milliGRAM(s) Oral before breakfast  polyethylene glycol 3350 17 Gram(s) Oral two times a day  senna 2 Tablet(s) Oral at bedtime    MEDICATIONS  (PRN):  ALBUTerol    90 MICROgram(s) HFA Inhaler 2 Puff(s) Inhalation every 6 hours PRN Shortness of Breath and/or Wheezing  bisacodyl Suppository 10 milliGRAM(s) Rectal daily PRN Constipation  oxyCODONE    IR 5 milliGRAM(s) Oral every 4 hours PRN Severe Pain (7 - 10)      Vital Signs Last 24 Hrs  T(C): 36.2 (2022 05:03), Max: 37 (2022 14:04)  T(F): 97.2 (2022 05:03), Max: 98.6 (2022 14:04)  HR: 62 (2022 05:03) (62 - 88)  BP: 119/74 (2022 05:03) (105/62 - 130/78)  BP(mean): --  RR: 17 (2022 05:03) (16 - 17)  SpO2: 97% (2022 05:03) (97% - 100%)    LABS: Reviewed.                          9.9    5.50  )-----------( 222      ( 2022 09:12 )             32.8     04-25    142  |  111<H>  |  10  ----------------------------<  88  3.9   |  26  |  0.52    Ca    10.5      2022 09:12  Phos  2.8     04-25  Mg     2.0     04-25    TPro  6.9  /  Alb  3.1<L>  /  TBili  0.2  /  DBili  x   /  AST  20  /  ALT  27  /  AlkPhos  44  04-25      LIVER FUNCTIONS - ( 2022 09:12 )  Alb: 3.1 g/dL / Pro: 6.9 g/dL / ALK PHOS: 44 U/L / ALT: 27 U/L DA / AST: 20 U/L / GGT: x           Urinalysis Basic - ( 2022 15:53 )    Color: Yellow / Appearance: Clear / S.010 / pH: x  Gluc: x / Ketone: Negative  / Bili: Negative / Urobili: Negative   Blood: x / Protein: Negative / Nitrite: Negative   Leuk Esterase: Negative / RBC: x / WBC x   Sq Epi: x / Non Sq Epi: x / Bacteria: x      CAPILLARY BLOOD GLUCOSE      POCT Blood Glucose.: 132 mg/dL (2022 16:01)    SARS-CoV-2: NotDetec (2022 15:18)  COVID-19 PCR: NotDetec (2022 15:24)      Radiology: Reviewed.   < from: CT Abdomen and Pelvis w/ IV Cont (22 @ 18:51) >    ACC: [id number] EXAM:  CT ABDOMEN AND PELVIS IC                          PROCEDURE DATE:  2022          INTERPRETATION:  CLINICAL INFORMATION: 46-year-old female with left lower   quadrant pain with fever    COMPARISON: MRI 2021    CONTRAST/COMPLICATIONS:  IV Contrast: Omnipaque 350  90 cc administered   10 cc discarded  Oral Contrast: NONE  Complications: None reported at time of study completion    PROCEDURE:  CT of the Abdomen and Pelvis was performed.  Sagittal and coronal reformats were performed.    FINDINGS:  LOWER CHEST: Within normal limits.    LIVER: Indeterminant 1.8 cm lesion segment 2. Suggest follow-up MRI with   liver protocol.  BILE DUCTS: Normal caliber.  GALLBLADDER: Within normal limits.  SPLEEN: Within normal limits.  PANCREAS: Within normal limits.  ADRENALS: Within normal limits.  KIDNEYS/URETERS: Within normal limits.    BLADDER: Within normal limits.  REPRODUCTIVE ORGANS: Enlarged uterus with 4.8 cm intracavitary submucosal   leiomyoma    BOWEL: No bowel obstruction. Appendix not visualized.  PERITONEUM: No ascites.  VESSELS: Within normal limits.  RETROPERITONEUM/LYMPH NODES: No lymphadenopathy.  ABDOMINAL WALL: Within normal limits.  BONES: Within normal limits.    IMPRESSION:  Enlarged leiomyomatous uterus again identified.  Indeterminant liver lesion. Suggest follow-up MRI        --- End of Report ---            FELICIA COTA MD; Attending Radiologist  This document has been electronically signed. 2022  7:05PM    < end of copied text >    < from: US Transvaginal (22 @ 16:49) >    ACC: [id number] EXAM:  US TRANSVAGINAL                        ACC: [id number] EXAM:  US PELVIC COMPLETE                          PROCEDURE DATE:  2022          INTERPRETATION:  CLINICAL INFORMATION: 46-year-old female with left lower   quadrant pain and fever.    LMP: 2022  .  COMPARISON: Ultrasound 2021. MRI 2021    TECHNIQUE:  Endovaginal and transabdominal pelvic sonogram.    FINDINGS:  Uterus: 13.8 x 11.2 x 9.7 cm. Multiple leiomyomata, largest fundal   intramural 4.6 x 3.6 x 3.9 cm. 3-4 cm leiomyoma appears to be submucosal  Endometrium: 8 mm. Within normal limits.    The ovaries were not visualized.    Fluid: None.    IMPRESSION:  Enlarged leiomyomatous uterus        --- End of Report ---            FELICIA COTA MD; Attending Radiologist  This document has been electronically signed. 2022  5:18PM    < end of copied text >      ORT Score -   Family Hx of substance abuse	Female	      Male  Alcohol 	                                           1                     3  Illegal drugs	                                   2                     3  Rx drugs                                           4 	                  4  Personal Hx of substance abuse		  Alcohol 	                                          3	                  3  Illegal drugs                                     4	                  4  Rx drugs                                            5 	                  5  Age between 16- 45 years	           1                     1  hx preadolescent sexual abuse	   3 	                  0  Psychological disease		  ADD, OCD, bipolar, schizophrenia   2	          2  Depression                                           1 	          1  Total: 0    a score of 3 or lower indicates low risk for opioid abuse		  a score of 4-7 indicates moderate risk for opioid abuse		  a score of 8 or higher indicates high risk for opioid abuse    REVIEW OF SYSTEMS:  CONSTITUTIONAL: No fever or fatigue  HEENT:  No difficulty hearing, no change in vision + hx migraines   NECK: No pain or stiffness  RESPIRATORY: No cough, wheezing, chills or hemoptysis; No shortness of breath  CARDIOVASCULAR: No chest pain, palpitations, dizziness, or leg swelling  GASTROINTESTINAL: + loss of appetite, decreased PO intake. + fullness + abdominal and pelvic pain. No nausea, vomiting; No diarrhea + constipation.   GENITOURINARY: No dysuria,hematuria, retention or incontinence + frequency.   GYN: +menses, heavy menstruation   MUSCULOSKELETAL: No joint pain or swelling; No muscle, back, or extremity pain, no upper or lower motor strength weakness, no saddle anesthesia, bowel/bladder incontinence, no falls   NEURO: No headaches, No numbness/tingling b/l LE, No weakness    PHYSICAL EXAM:  GENERAL:  Alert & Oriented X4, cooperative, NAD, Good concentration. Speech is clear.   RESPIRATORY: Respirations even and unlabored. Clear to auscultation bilaterally; No rales, rhonchi, wheezing, or rubs  CARDIOVASCULAR: Normal S1/S2, regular rate and rhythm; No murmurs, rubs, or gallops. No JVD.   GASTROINTESTINAL:  Soft, + left lower quadrant tender, + distended; Bowel sounds present  PERIPHERAL VASCULAR:  Extremities warm without edema. 2+ Peripheral Pulses, No cyanosis, No calf tenderness  MUSCULOSKELETAL: Motor Strength 5/5 B/L upper and lower extremities; moves all extremities equally against gravity; ROM intact; No tenderness on palpation of all joints.   SKIN: Warm, dry, intact. No rashes, lesions or wounds. + right ankle healed surgical scar      Risk factors associated with adverse outcomes related to opioid treatment  [ ]  Concurrent benzodiazepine use  [ ]  History/ Active substance use or alcohol use disorder  [ ] Psychiatric co-morbidity  [ ] Sleep apnea  [ ] COPD  [ ] BMI> 35  [ ] Liver dysfunction  [ ] Renal dysfunction  [ ] CHF  [ ] Smoker  [ ]  Age > 60 years    [X ]  [place]  Reviewed and Copied to Chart. See below.    Plan of care and goal oriented pain management treatment options were discussed with patient and /or primary care giver; all questions and concerns were addressed and care was aligned with patient's wishes.    Educated patient on goal oriented pain management treatment options

## 2022-04-25 NOTE — CONSULT NOTE ADULT - PROBLEM SELECTOR RECOMMENDATION 9
Pt with left lower quadrant abdominal pain and left pelvic pain which is somatic in nature due to uterine fibroids. Plan for hysterectomy OP. Pt also with constipation and anemia secondary to heavy menstrual bleeding.   Opioid pain recommendations   - Oxycodone 5 mg PO q 4 hours PRN severe pain. Monitor for sedation/ respiratory depression.   Non-opioid pain recommendations   - Discontinue Toradol due to anemia  - Acetaminophen 1 gram PO q 6 hours x 3 days. Monitor LFTs  Bowel Regimen  - Continue Miralax 17G PO BID  - Continue Senna 2 tablets at bedtime for constipation  - Continue dulcolax 10mg rectal daily PRN constipation  Mild pain   - Non-pharmacological pain treatment recommendations  - Warm/ Cool packs PRN   - Repositioning, imagery, relaxation, distraction.  - Physical therapy OOB if no contraindications   Recommendations discussed with primary team and RN

## 2022-04-25 NOTE — DISCHARGE NOTE NURSING/CASE MANAGEMENT/SOCIAL WORK - PATIENT PORTAL LINK FT
You can access the FollowMyHealth Patient Portal offered by [facility] by registering at the following website: [email or web address]. By joining [employer]’s FollowMyHealth portal, you will also be able to view your health information using other applications (apps) compatible with our system.

## 2022-04-26 PROBLEM — G43.909 MIGRAINE, UNSPECIFIED, NOT INTRACTABLE, WITHOUT STATUS MIGRAINOSUS: Chronic | Status: ACTIVE | Noted: 2022-04-25

## 2022-04-26 PROBLEM — Z78.9 OTHER SPECIFIED HEALTH STATUS: Chronic | Status: ACTIVE | Noted: 2022-04-25

## 2022-04-27 ENCOUNTER — APPOINTMENT (OUTPATIENT)
Dept: MRI IMAGING | Facility: HOSPITAL | Age: 47
End: 2022-04-27
Payer: MEDICAID

## 2022-04-27 ENCOUNTER — OUTPATIENT (OUTPATIENT)
Dept: OUTPATIENT SERVICES | Facility: HOSPITAL | Age: 47
LOS: 1 days | End: 2022-04-27
Payer: MEDICAID

## 2022-04-27 DIAGNOSIS — K86.9 DISEASE OF PANCREAS, UNSPECIFIED: ICD-10-CM

## 2022-04-27 DIAGNOSIS — Z98.890 OTHER SPECIFIED POSTPROCEDURAL STATES: Chronic | ICD-10-CM

## 2022-04-27 PROCEDURE — 74183 MRI ABD W/O CNTR FLWD CNTR: CPT

## 2022-04-27 PROCEDURE — A9579: CPT

## 2022-04-27 PROCEDURE — 74183 MRI ABD W/O CNTR FLWD CNTR: CPT | Mod: 26

## 2022-04-28 LAB
CULTURE RESULTS: SIGNIFICANT CHANGE UP
CULTURE RESULTS: SIGNIFICANT CHANGE UP
SPECIMEN SOURCE: SIGNIFICANT CHANGE UP
SPECIMEN SOURCE: SIGNIFICANT CHANGE UP

## 2022-05-05 ENCOUNTER — APPOINTMENT (OUTPATIENT)
Dept: GASTROENTEROLOGY | Facility: CLINIC | Age: 47
End: 2022-05-05
Payer: MEDICAID

## 2022-05-05 VITALS
BODY MASS INDEX: 23.4 KG/M2 | OXYGEN SATURATION: 100 % | WEIGHT: 158 LBS | HEIGHT: 69 IN | SYSTOLIC BLOOD PRESSURE: 113 MMHG | DIASTOLIC BLOOD PRESSURE: 76 MMHG | TEMPERATURE: 97.8 F | HEART RATE: 76 BPM

## 2022-05-05 PROCEDURE — 99213 OFFICE O/P EST LOW 20 MIN: CPT

## 2022-05-05 RX ORDER — PANTOPRAZOLE 40 MG/1
40 TABLET, DELAYED RELEASE ORAL
Qty: 90 | Refills: 3 | Status: ACTIVE | COMMUNITY
Start: 2022-05-05 | End: 1900-01-01

## 2022-05-09 NOTE — HISTORY OF PRESENT ILLNESS
[de-identified] : This is 46 year old female here for a follow up of pancreatic cyst. MR abdomen showed 5 mm cystic lesion in the pancreatic body and it appears to communicate with the main pancreatic duct, suggestive of a side branch IPMN. Her CEA and  normal. Currently reports epigastric burning. EGD from 2018 showed gastritis and hiatal hernia. Denies difficulty swallowing. Complains worsening constipation and MiraLAX did not help. Using rectal suppository and it helps.

## 2022-05-09 NOTE — ASSESSMENT
[FreeTextEntry1] : This is 46 year old female here for a follow up of pancreatic cyst. MR abdomen showed 5 mm cystic lesion in the pancreatic body and it appears to communicate with the main pancreatic duct, suggestive of a side branch IPMN per report but ? this given it communicates with main duct. Her CEA and  normal. Currently reports epigastric burning. EGD from 2018 showed gastritis and hiatal hernia. Denies difficulty swallowing. Complains worsening constipation and MiraLAX did not help. Using rectal suppository and it helps. \par \par My plan\par -EGD \par -colonoscopy\par -Pantoprazole 40mg daily\par -Gaviltyte\par -6 month follow up with Dr. Luis for Surveillance of pancreatic cyst \par \par Risks, benefits, and alternatives of EGD and colonoscopy discussed at length with patient including but not limited to bleeding perforation, anesthesia related complication, aspiration, etc. Patient expressed understanding.\par \par EGD/Colonoscopy for evaluation of polyps, tumors, nodules with +/- biopsy and or cauterization w/ and or w/o clipping. \par

## 2022-05-13 ENCOUNTER — APPOINTMENT (OUTPATIENT)
Dept: OBGYN | Facility: CLINIC | Age: 47
End: 2022-05-13
Payer: MEDICAID

## 2022-05-13 VITALS
TEMPERATURE: 98.4 F | HEART RATE: 93 BPM | RESPIRATION RATE: 16 BRPM | HEIGHT: 69 IN | OXYGEN SATURATION: 100 % | DIASTOLIC BLOOD PRESSURE: 78 MMHG | SYSTOLIC BLOOD PRESSURE: 118 MMHG | BODY MASS INDEX: 23.4 KG/M2 | WEIGHT: 158 LBS

## 2022-05-13 PROCEDURE — 99396 PREV VISIT EST AGE 40-64: CPT

## 2022-05-16 ENCOUNTER — APPOINTMENT (OUTPATIENT)
Dept: OBGYN | Facility: CLINIC | Age: 47
End: 2022-05-16

## 2022-05-17 ENCOUNTER — APPOINTMENT (OUTPATIENT)
Dept: OBGYN | Facility: CLINIC | Age: 47
End: 2022-05-17

## 2022-06-02 ENCOUNTER — OUTPATIENT (OUTPATIENT)
Dept: OUTPATIENT SERVICES | Facility: HOSPITAL | Age: 47
LOS: 1 days | End: 2022-06-02
Payer: MEDICAID

## 2022-06-02 ENCOUNTER — APPOINTMENT (OUTPATIENT)
Dept: OBGYN | Facility: CLINIC | Age: 47
End: 2022-06-02
Payer: MEDICAID

## 2022-06-02 ENCOUNTER — LABORATORY RESULT (OUTPATIENT)
Age: 47
End: 2022-06-02

## 2022-06-02 VITALS
SYSTOLIC BLOOD PRESSURE: 120 MMHG | WEIGHT: 162 LBS | BODY MASS INDEX: 23.99 KG/M2 | HEIGHT: 69 IN | DIASTOLIC BLOOD PRESSURE: 76 MMHG

## 2022-06-02 DIAGNOSIS — Z98.890 OTHER SPECIFIED POSTPROCEDURAL STATES: Chronic | ICD-10-CM

## 2022-06-02 DIAGNOSIS — Z00.00 ENCOUNTER FOR GENERAL ADULT MEDICAL EXAMINATION W/OUT ABNORMAL FINDINGS: ICD-10-CM

## 2022-06-02 DIAGNOSIS — N76.0 ACUTE VAGINITIS: ICD-10-CM

## 2022-06-02 PROCEDURE — G0463: CPT

## 2022-06-02 PROCEDURE — 87624 HPV HI-RISK TYP POOLED RSLT: CPT

## 2022-06-02 PROCEDURE — 99213 OFFICE O/P EST LOW 20 MIN: CPT | Mod: GC,25

## 2022-06-02 RX ORDER — NORETHINDRONE 0.35 MG/1
0.35 TABLET ORAL DAILY
Qty: 6 | Refills: 1 | Status: ACTIVE | COMMUNITY
Start: 2022-06-02 | End: 1900-01-01

## 2022-06-03 LAB — HPV HIGH+LOW RISK DNA PNL CVX: SIGNIFICANT CHANGE UP

## 2022-06-03 NOTE — HISTORY OF PRESENT ILLNESS
[FreeTextEntry1] : 46 year G 2  P \par 2 \par LMP: 2022    Irregular Menses\par Medication Iron supplement\par PAP- WNL 2022.\par Her Pelvic Sonogram  & MRI results reviewed. \par Patient with multiple fibroids & Menorrhagia. \par Tired of bleeding & want's Hysterectomy \par Was Seeing Dr. Hart\par Had all the work-up done & is going to be booked for surgery. \par Patient in for discussion. \par As I do not do laparoscopic hysterectomy.\par To go back to DR. Melendez or DR. Rebolledo for surgical management.  \par \par

## 2022-06-06 LAB — CYTOLOGY SPEC DOC CYTO: SIGNIFICANT CHANGE UP

## 2022-06-06 NOTE — PHYSICAL EXAM
[Appropriately responsive] : appropriately responsive [Alert] : alert [No Acute Distress] : no acute distress [Regular Rate Rhythm] : regular rate rhythm [Clear to Auscultation B/L] : clear to auscultation bilaterally [Soft] : soft [Non-tender] : non-tender [Non-distended] : non-distended [No Mass] : no mass [Oriented x3] : oriented x3 [Labia Majora] : normal [Labia Minora] : normal [Normal] : normal [No Bleeding] : There was no active vaginal bleeding [Normal Position] : in a normal position [Enlarged ___ wks] : enlarged [unfilled] ~Uweeks [Uterine Adnexae] : non-palpable [FreeTextEntry5] : Deviated towards patient left. Smooth.  Bulbous enlargement, likely cervical fibroid.  [FreeTextEntry6] : Approximately 14w sized uterus, bulky, mobile. Adnexa nonpalpable bilaterally.

## 2022-06-06 NOTE — HISTORY OF PRESENT ILLNESS
[FreeTextEntry1] : 47y/o F LMP ~3wks ago with symptomatic fibroid uterus presenting for consultation for surgical management. Patient reports heavy menstrual bleeding (2-3 week periods at regular monthly intervals). She notes that she is anemic as a result and also experiences fatigue and shortness of breath with ambulation. She notes pelvic pressure, pain with intercourse and painful periods. She also notes frequent urination. She does not desire future fertility. \par \par Of note, the patients mother is scheduled for an ex-lap tomorrow with Dr. Ramírez of GYN Oncology for an ovarian mass. Her mother was tested for BRCA, results pending. \par \par Pt obtained MRI (results below) - notable for fibroid uterus and 1.6cm enhancing L ovarian lesion. \par  noted to be 105  (3/4/2022) \par CA 19-9, CEA wnl \par \par EMBx (2021): benign \par \par ObHx:  x2 \par Gynhx: +Fibroid uterus. Denies h/o abnormal paps. \par PMHx: Asthma \par PSHx: Ankle surgery, breast lumpectomy x2 \par Allergies: NKDA \par Meds: Albuterol PRN, Fe \par FHx: Mother with ovarian mass, pending surgery (scheduled 6/3) - genetic testing sent \par \par \par MRI: \par \par MR Pelvis w/wo IV Cont            \par  ACC: [id number]     EXAM:  MR PELVIS WAW IC\par  PROCEDURE DATE:  2021\par  INTERPRETATION:  CLINICAL INFORMATION: Uterine fibroids. LMP 2021\par  COMPARISON: None.\par \par PROCEDURE:\par MRI of the pelvis was performed.\par \par FINDINGS:\par UTERUS: 12.4 x 11.5 x 10.4 cm which is enlarged. Multiple uterine fibroids are seen and most of them are intramural in location. The largest fibroid is intramural with a submucosal component measuring 4.0 x 3.3 x 4.8 cm in the posterior uterine segment. The second largest fibroid measures 3.9 x 2.8 x 2.6 cm and it is subserosal in the right posterior uterine segment. There is another 2.1 x 2.4 x 2.0 cm intramural fibroid with submucosal component in the left uterus. Small nabothian cysts in the cervix.\par ENDOMETRIUM: Measures up to 8 mm in thickness which is within normal limits.\par JUNCTIONAL ZONE: Not well delineated. Multiple T2 hyperintense foci in the myometrium, compatible with adenomyosis.\par \par RIGHT OVARY: Within normal limits.\par LEFT OVARY: Apparent 1.6 cm enhancing left ovarian lesion which is slightly T2 hyperintense.\par ADNEXA: Within normal limits.\par \par BLADDER: Within normal limits.\par \par LYMPH NODES: No pelvic lymphadenopathy.\par \par VISUALIZED PORTIONS:\par \par ABDOMINAL ORGANS: Within normal limits.\par BOWEL: Within normal limits.\par PERITONEUM: Small pelvic free fluid, likely physiologic for age.\par VESSELS: Within normal limits.\par ABDOMINAL WALL: Within normal limits.\par BONES: Degenerative spondylosis.\par \par IMPRESSION:\par Enlarged fibroid uterus as detailed above.\par \par Multiple T2 hyperintense foci in the myometrium, compatible with adenomyosis.\par \par Apparent 1.6 cm enhancing left ovarian lesion. Ovarian cancer cannot be excluded.\par \par --- End of Report ---\par \par

## 2022-06-06 NOTE — DISCUSSION/SUMMARY
[FreeTextEntry1] : 45y/o F presenting for pre-op consultation, desiring hysterectomy for surgical management of symptomatic uterine fibroids. Discussed recommendation for TLH with bilateral salpingectomy. Patient counseled regarding recommendation to remove cervix with remainder of uterus, however specifically requesting supracervical hysterectomy at this time. Provided with [facility] paperwork to review. \par \par  - Pap obtained, sent today \par  - Norethindrone rx'd for management of menstrual bleeding leading up to planned surgery\par  - As per Dr. Pierce, would like pt to receive clearance from GYN Oncology re appropriate for benign GYN surgery given fhx of likely cancer in mother (sx scheduled tomorrow for ovarian mass a/w PE), elevated  and lesion noted on MRI \par  - Pt to obtain medical clearance from PCP \par  - Booking office tasked to schedule [facility] TLH, BS (possible GIOVANI if Pap wnl and pt continues to desire GIOVANI)\par  - Medicaid Hysterectomy consents signed with patient \par \par Pt seen, counseled and examined with Dr. Pierce \par MSsebas PGY3 \par \par \par MIGS FELLOW ADDENDUM\par I have seen the patient, read the above note, and agree with the resident's assessment and plan with the following additions/exceptions: \par \par  is a 45yo P2 well known to booking clinic for AUB-L s/p IR consultation (not candidate) who presents for discussion re: definitive management. Pt also found to have 1.6cm L ovarian mass with elevated CA-125 (105). Of note, pt's mother undergoing surgery with Dr. Cronin for large pelvic mass tomorrow. MRI notable for T3 posterior fibroid, T 0-1 fundal L fibroid and adenomyosis. Cervical cancer screen collected today. EMB (4/1) secretory EE with polyp.\par \par - f/u pap\par - f/u pt's mother BRCA testing\par - will discuss case with Dr. Cronin GYN Oncology re: adnexal mass, mildly elevated  in premenopausal patient \par - Pathology dw patient at length. Recommend TLH, LSO, RS, cystoscopy. Pt strongly desires to keep cervix after extensive discussion. \par - Obtain medical clearance\par - Task sent for [facility] GIOVANI, BS, possible L ovarian cystectomy vs L oophorectomy, cystoscopy. AAGL ppw given to patient. Hysterectomy consent signed. \par - Norethindrone 5gm daily to bridge to surgery \par \par CWStan, PGY6\par D/w Dr. Melendez, attending\par

## 2022-06-08 ENCOUNTER — NON-APPOINTMENT (OUTPATIENT)
Age: 47
End: 2022-06-08

## 2022-06-09 ENCOUNTER — NON-APPOINTMENT (OUTPATIENT)
Age: 47
End: 2022-06-09

## 2022-06-15 DIAGNOSIS — R10.2 PELVIC AND PERINEAL PAIN: ICD-10-CM

## 2022-06-15 DIAGNOSIS — D21.9 BENIGN NEOPLASM OF CONNECTIVE AND OTHER SOFT TISSUE, UNSPECIFIED: ICD-10-CM

## 2022-06-20 ENCOUNTER — NON-APPOINTMENT (OUTPATIENT)
Age: 47
End: 2022-06-20

## 2022-06-22 PROBLEM — N83.209 OVARIAN CYST: Status: ACTIVE | Noted: 2022-06-22

## 2022-06-22 PROBLEM — Z86.2 HISTORY OF ANEMIA: Status: RESOLVED | Noted: 2021-08-27 | Resolved: 2022-06-22

## 2022-06-22 PROBLEM — Z87.42 HISTORY OF MENORRHAGIA: Status: RESOLVED | Noted: 2020-09-22 | Resolved: 2022-06-22

## 2022-06-22 PROBLEM — Z87.42 HISTORY OF OVARIAN CYST: Status: RESOLVED | Noted: 2022-03-04 | Resolved: 2022-06-22

## 2022-06-27 ENCOUNTER — APPOINTMENT (OUTPATIENT)
Dept: GYNECOLOGIC ONCOLOGY | Facility: CLINIC | Age: 47
End: 2022-06-27
Payer: MEDICAID

## 2022-06-27 VITALS
HEART RATE: 78 BPM | OXYGEN SATURATION: 98 % | HEIGHT: 69 IN | BODY MASS INDEX: 23.7 KG/M2 | DIASTOLIC BLOOD PRESSURE: 73 MMHG | WEIGHT: 160 LBS | SYSTOLIC BLOOD PRESSURE: 130 MMHG

## 2022-06-27 DIAGNOSIS — N83.209 UNSPECIFIED OVARIAN CYST, UNSPECIFIED SIDE: ICD-10-CM

## 2022-06-27 DIAGNOSIS — Z87.42 PERSONAL HISTORY OF OTHER DISEASES OF THE FEMALE GENITAL TRACT: ICD-10-CM

## 2022-06-27 DIAGNOSIS — Z80.41 FAMILY HISTORY OF MALIGNANT NEOPLASM OF OVARY: ICD-10-CM

## 2022-06-27 DIAGNOSIS — Z86.2 PERSONAL HISTORY OF DISEASES OF THE BLOOD AND BLOOD-FORMING ORGANS AND CERTAIN DISORDERS INVOLVING THE IMMUNE MECHANISM: ICD-10-CM

## 2022-06-27 PROCEDURE — 99205 OFFICE O/P NEW HI 60 MIN: CPT

## 2022-07-07 ENCOUNTER — OUTPATIENT (OUTPATIENT)
Dept: OUTPATIENT SERVICES | Facility: HOSPITAL | Age: 47
LOS: 1 days | End: 2022-07-07
Payer: MEDICAID

## 2022-07-07 ENCOUNTER — APPOINTMENT (OUTPATIENT)
Dept: OBGYN | Facility: CLINIC | Age: 47
End: 2022-07-07

## 2022-07-07 VITALS — DIASTOLIC BLOOD PRESSURE: 60 MMHG | WEIGHT: 162 LBS | SYSTOLIC BLOOD PRESSURE: 102 MMHG | BODY MASS INDEX: 23.92 KG/M2

## 2022-07-07 DIAGNOSIS — Z98.890 OTHER SPECIFIED POSTPROCEDURAL STATES: Chronic | ICD-10-CM

## 2022-07-07 DIAGNOSIS — D25.9 LEIOMYOMA OF UTERUS, UNSPECIFIED: ICD-10-CM

## 2022-07-07 DIAGNOSIS — N76.0 ACUTE VAGINITIS: ICD-10-CM

## 2022-07-07 PROCEDURE — G0463: CPT

## 2022-07-07 PROCEDURE — 99213 OFFICE O/P EST LOW 20 MIN: CPT | Mod: GC

## 2022-07-07 NOTE — END OF VISIT
[] : Resident [FreeTextEntry3] : Patient spoken to at length, discussed plan for hysterectomy. Discussed that my recommendation would be total hysterectomy but pt states she desires to keep her cervix. Discussed risk of malignancy, requrining another surgery for cervix removal ,continual cylical bleeding, need for pap smear screening. pt aware. will plan for EUA, lap SCHBS, cystoscopy. Discussed risks of procedure including but not limited to VTE, SSI, damage to bowel, bladder laparotomy, need for transfusion. Discussed underlying malignancy. Pt has seen Dr Cronin of gyn onc ,states low risk. All questions answered. taske to be booked. reviewed post operative recovery\par \par Vickie Melendez MD

## 2022-07-07 NOTE — PHYSICAL EXAM
[Appropriately responsive] : appropriately responsive [Alert] : alert [No Acute Distress] : no acute distress [No Lymphadenopathy] : no lymphadenopathy [Regular Rate Rhythm] : regular rate rhythm [Clear to Auscultation B/L] : clear to auscultation bilaterally [Soft] : soft [Non-tender] : non-tender [No Lesions] : no lesions

## 2022-07-20 DIAGNOSIS — N93.9 ABNORMAL UTERINE AND VAGINAL BLEEDING, UNSPECIFIED: ICD-10-CM

## 2022-07-20 DIAGNOSIS — D25.9 LEIOMYOMA OF UTERUS, UNSPECIFIED: ICD-10-CM

## 2022-07-26 ENCOUNTER — APPOINTMENT (OUTPATIENT)
Dept: INTERNAL MEDICINE | Facility: CLINIC | Age: 47
End: 2022-07-26

## 2022-07-26 VITALS
TEMPERATURE: 98 F | BODY MASS INDEX: 24.14 KG/M2 | DIASTOLIC BLOOD PRESSURE: 79 MMHG | HEIGHT: 69 IN | OXYGEN SATURATION: 100 % | WEIGHT: 163 LBS | RESPIRATION RATE: 16 BRPM | SYSTOLIC BLOOD PRESSURE: 136 MMHG | HEART RATE: 77 BPM

## 2022-07-26 PROCEDURE — 99213 OFFICE O/P EST LOW 20 MIN: CPT

## 2022-07-26 NOTE — ASSESSMENT
[FreeTextEntry1] : 46 year old female found to have stable ,with the current prescription regimen as recommended, diet and life style modifications, as counseled. Prior results reviewed, interpreted and discussed with the patient during today's examination, as appropriate. Follow up, treatment plan and tests, as ordered.\par \par Total time spent : 25 minutes\par Including:\par Preparation prior to visit - Reviewing prior record, results of tests and Consultation Reports as applicable\par Conducting an appropriate H & P during today's encounter\par Appropriate orders for tests, medications and procedures, as applicable\par Counseling patient \par Note completion\par

## 2022-07-26 NOTE — HEALTH RISK ASSESSMENT
[Never] : Never [No] : In the past 12 months have you used drugs other than those required for medical reasons? No [No falls in past year] : Patient reported no falls in the past year [de-identified] : GYN/GYN ONC

## 2022-07-26 NOTE — HISTORY OF PRESENT ILLNESS
[de-identified] : 46 year old  female patient with history of stable Asthma, Iron Deficiency Anemia, Lumbar Radiculopathy, Migraine, history as stated, presented for follow up examination. Patient is compliant with all medications. ROS as stated.\par

## 2022-08-10 ENCOUNTER — TRANSCRIPTION ENCOUNTER (OUTPATIENT)
Age: 47
End: 2022-08-10

## 2022-08-23 ENCOUNTER — APPOINTMENT (OUTPATIENT)
Dept: MAMMOGRAPHY | Facility: IMAGING CENTER | Age: 47
End: 2022-08-23

## 2022-08-23 ENCOUNTER — APPOINTMENT (OUTPATIENT)
Dept: GASTROENTEROLOGY | Facility: CLINIC | Age: 47
End: 2022-08-23

## 2022-09-15 ENCOUNTER — APPOINTMENT (OUTPATIENT)
Dept: INTERNAL MEDICINE | Facility: CLINIC | Age: 47
End: 2022-09-15

## 2022-09-29 ENCOUNTER — APPOINTMENT (OUTPATIENT)
Dept: OBGYN | Facility: HOSPITAL | Age: 47
End: 2022-09-29

## 2022-10-24 ENCOUNTER — APPOINTMENT (OUTPATIENT)
Dept: MAMMOGRAPHY | Facility: HOSPITAL | Age: 47
End: 2022-10-24

## 2022-10-24 ENCOUNTER — APPOINTMENT (OUTPATIENT)
Dept: ULTRASOUND IMAGING | Facility: HOSPITAL | Age: 47
End: 2022-10-24

## 2022-10-25 ENCOUNTER — APPOINTMENT (OUTPATIENT)
Dept: GASTROENTEROLOGY | Facility: CLINIC | Age: 47
End: 2022-10-25

## 2022-11-08 ENCOUNTER — APPOINTMENT (OUTPATIENT)
Dept: GASTROENTEROLOGY | Facility: CLINIC | Age: 47
End: 2022-11-08

## 2022-12-01 ENCOUNTER — APPOINTMENT (OUTPATIENT)
Dept: INTERNAL MEDICINE | Facility: CLINIC | Age: 47
End: 2022-12-01

## 2022-12-01 VITALS
BODY MASS INDEX: 23.55 KG/M2 | RESPIRATION RATE: 16 BRPM | HEART RATE: 71 BPM | SYSTOLIC BLOOD PRESSURE: 125 MMHG | DIASTOLIC BLOOD PRESSURE: 73 MMHG | WEIGHT: 159 LBS | OXYGEN SATURATION: 98 % | HEIGHT: 69 IN | TEMPERATURE: 98 F

## 2022-12-01 DIAGNOSIS — F41.9 ANXIETY DISORDER, UNSPECIFIED: ICD-10-CM

## 2022-12-01 DIAGNOSIS — K59.09 OTHER CONSTIPATION: ICD-10-CM

## 2022-12-01 PROCEDURE — 99213 OFFICE O/P EST LOW 20 MIN: CPT

## 2022-12-01 RX ORDER — METOCLOPRAMIDE 10 MG/1
10 TABLET ORAL
Qty: 40 | Refills: 0 | Status: DISCONTINUED | COMMUNITY
Start: 2022-10-19

## 2022-12-01 RX ORDER — SULFAMETHOXAZOLE AND TRIMETHOPRIM 800; 160 MG/1; MG/1
800-160 TABLET ORAL
Qty: 6 | Refills: 0 | Status: DISCONTINUED | COMMUNITY
Start: 2022-09-29

## 2022-12-01 RX ORDER — CYCLOBENZAPRINE HYDROCHLORIDE 10 MG/1
10 TABLET, FILM COATED ORAL
Qty: 7 | Refills: 0 | Status: DISCONTINUED | COMMUNITY
Start: 2022-10-19

## 2022-12-01 RX ORDER — LEVOFLOXACIN 500 MG/1
500 TABLET, FILM COATED ORAL
Qty: 7 | Refills: 0 | Status: DISCONTINUED | COMMUNITY
Start: 2022-10-19

## 2022-12-01 RX ORDER — DOCUSATE SODIUM 100 MG/1
100 TABLET ORAL
Qty: 90 | Refills: 0 | Status: DISCONTINUED | COMMUNITY
Start: 2022-10-19

## 2022-12-01 RX ORDER — TRANEXAMIC ACID 650 MG/1
650 TABLET ORAL
Qty: 30 | Refills: 0 | Status: DISCONTINUED | COMMUNITY
Start: 2022-08-18

## 2022-12-01 RX ORDER — IBUPROFEN 400 MG/1
400 TABLET, FILM COATED ORAL
Qty: 50 | Refills: 0 | Status: DISCONTINUED | COMMUNITY
Start: 2022-10-19

## 2022-12-01 RX ORDER — KETOROLAC TROMETHAMINE 10 MG/1
10 TABLET, FILM COATED ORAL
Qty: 12 | Refills: 0 | Status: DISCONTINUED | COMMUNITY
Start: 2022-10-19

## 2022-12-01 NOTE — HEALTH RISK ASSESSMENT
[Never] : Never [No] : In the past 12 months have you used drugs other than those required for medical reasons? No [No falls in past year] : Patient reported no falls in the past year [de-identified] : GYN/GYN ONC

## 2022-12-01 NOTE — HISTORY OF PRESENT ILLNESS
[de-identified] : 47 year old  female patient with history of stable Asthma, Iron Deficiency Anemia, Lumbar Radiculopathy, Migraine, history as stated, presented for follow up examination. Patient is compliant with all medications. ROS as stated.\par

## 2022-12-01 NOTE — ASSESSMENT
[FreeTextEntry1] : 46 year old female found to have stable Asthma, Iron Deficiency Anemia, Lumbar Radiculopathy, Migraine,with the current prescription regimen as recommended, diet and life style modifications, as counseled. Prior results reviewed, interpreted and discussed with the patient during today's examination, as appropriate. Follow up, treatment plan and tests, as ordered.\par \par Total time spent : 25 minutes\par Including:\par Preparation prior to visit - Reviewing prior record, results of tests and Consultation Reports as applicable\par Conducting an appropriate H & P during today's encounter\par Appropriate orders for tests, medications and procedures, as applicable\par Counseling patient \par Note completion\par

## 2022-12-03 LAB
25(OH)D3 SERPL-MCNC: 19.5 NG/ML
ALBUMIN SERPL ELPH-MCNC: 4.6 G/DL
ALP BLD-CCNC: 60 U/L
ALT SERPL-CCNC: 12 U/L
ANION GAP SERPL CALC-SCNC: 11 MMOL/L
APPEARANCE: ABNORMAL
AST SERPL-CCNC: 15 U/L
BACTERIA: NEGATIVE
BASOPHILS # BLD AUTO: 0.06 K/UL
BASOPHILS NFR BLD AUTO: 1 %
BILIRUB SERPL-MCNC: 0.4 MG/DL
BILIRUBIN URINE: NEGATIVE
BLOOD URINE: NEGATIVE
BUN SERPL-MCNC: 12 MG/DL
CALCIUM SERPL-MCNC: 10.6 MG/DL
CHLORIDE SERPL-SCNC: 103 MMOL/L
CHOLEST SERPL-MCNC: 210 MG/DL
CO2 SERPL-SCNC: 24 MMOL/L
COLOR: YELLOW
CREAT SERPL-MCNC: 0.56 MG/DL
CREAT SPEC-SCNC: 218 MG/DL
EGFR: 113 ML/MIN/1.73M2
EOSINOPHIL # BLD AUTO: 0.08 K/UL
EOSINOPHIL NFR BLD AUTO: 1.3 %
ESTIMATED AVERAGE GLUCOSE: 111 MG/DL
FOLATE SERPL-MCNC: 13.3 NG/ML
GGT SERPL-CCNC: 9 U/L
GLUCOSE QUALITATIVE U: NEGATIVE
GLUCOSE SERPL-MCNC: 86 MG/DL
HBA1C MFR BLD HPLC: 5.5 %
HCT VFR BLD CALC: 35.1 %
HDLC SERPL-MCNC: 58 MG/DL
HGB BLD-MCNC: 10.2 G/DL
HYALINE CASTS: 0 /LPF
IMM GRANULOCYTES NFR BLD AUTO: 0.2 %
IRON SATN MFR SERPL: 6 %
IRON SERPL-MCNC: 24 UG/DL
KETONES URINE: ABNORMAL
LDLC SERPL CALC-MCNC: 139 MG/DL
LEUKOCYTE ESTERASE URINE: NEGATIVE
LYMPHOCYTES # BLD AUTO: 1.35 K/UL
LYMPHOCYTES NFR BLD AUTO: 22.4 %
MAN DIFF?: NORMAL
MCHC RBC-ENTMCNC: 22.6 PG
MCHC RBC-ENTMCNC: 29.1 GM/DL
MCV RBC AUTO: 77.8 FL
MICROALBUMIN 24H UR DL<=1MG/L-MCNC: 1.9 MG/DL
MICROALBUMIN/CREAT 24H UR-RTO: 9 MG/G
MICROSCOPIC-UA: NORMAL
MONOCYTES # BLD AUTO: 0.65 K/UL
MONOCYTES NFR BLD AUTO: 10.8 %
NEUTROPHILS # BLD AUTO: 3.88 K/UL
NEUTROPHILS NFR BLD AUTO: 64.3 %
NITRITE URINE: NEGATIVE
NONHDLC SERPL-MCNC: 152 MG/DL
PH URINE: 6
PLATELET # BLD AUTO: 244 K/UL
POTASSIUM SERPL-SCNC: 4.3 MMOL/L
PROT SERPL-MCNC: 7.4 G/DL
PROTEIN URINE: ABNORMAL
RBC # BLD: 4.51 M/UL
RBC # FLD: 19.8 %
RED BLOOD CELLS URINE: 3 /HPF
SODIUM SERPL-SCNC: 138 MMOL/L
SPECIFIC GRAVITY URINE: 1.03
SQUAMOUS EPITHELIAL CELLS: 11 /HPF
TIBC SERPL-MCNC: 426 UG/DL
TRIGL SERPL-MCNC: 64 MG/DL
TSH SERPL-ACNC: 1.03 UIU/ML
UIBC SERPL-MCNC: 402 UG/DL
UROBILINOGEN URINE: NORMAL
VIT B12 SERPL-MCNC: 516 PG/ML
WBC # FLD AUTO: 6.03 K/UL
WHITE BLOOD CELLS URINE: 4 /HPF

## 2022-12-03 RX ORDER — CHLORHEXIDINE GLUCONATE 4 %
325 (65 FE) LIQUID (ML) TOPICAL TWICE DAILY
Qty: 60 | Refills: 5 | Status: ACTIVE | COMMUNITY
Start: 2017-07-25 | End: 1900-01-01

## 2022-12-05 ENCOUNTER — TRANSCRIPTION ENCOUNTER (OUTPATIENT)
Age: 47
End: 2022-12-05

## 2022-12-07 ENCOUNTER — APPOINTMENT (OUTPATIENT)
Dept: GASTROENTEROLOGY | Facility: CLINIC | Age: 47
End: 2022-12-07

## 2022-12-07 VITALS
HEART RATE: 82 BPM | DIASTOLIC BLOOD PRESSURE: 79 MMHG | WEIGHT: 159 LBS | HEIGHT: 69 IN | BODY MASS INDEX: 23.55 KG/M2 | OXYGEN SATURATION: 99 % | SYSTOLIC BLOOD PRESSURE: 118 MMHG

## 2022-12-07 DIAGNOSIS — D64.9 ANEMIA, UNSPECIFIED: ICD-10-CM

## 2022-12-07 DIAGNOSIS — K86.9 DISEASE OF PANCREAS, UNSPECIFIED: ICD-10-CM

## 2022-12-07 DIAGNOSIS — R10.13 EPIGASTRIC PAIN: ICD-10-CM

## 2022-12-07 PROCEDURE — 99204 OFFICE O/P NEW MOD 45 MIN: CPT

## 2022-12-07 PROCEDURE — 99214 OFFICE O/P EST MOD 30 MIN: CPT

## 2022-12-07 NOTE — ASSESSMENT
[FreeTextEntry1] : Abdominal Pain: The patient complains of abdominal pain. The patient is to avoid nonsteroidal anti-inflammatory drugs and aspirin. I recommend a trial of Omeprazole 20 mg twice a day for 3 months for the symptoms.  \par Dyspepsia: The patient complains of dyspeptic symptoms.  The patient was advised to abide by an anti-gas (low FOD-MAP) diet.  The patient was given a pamphlet for anti-gas (low FOD-MAP).  The patient and I reviewed the anti-gas (low FOD-MAP) diet at length. The patient is to start on a trial of Simethicone one tablet 4 times a day p.r.n. abdominal pain and gas.\par GERD: The patient was advised to avoid late-night meals and dietary indiscretions.  The patient was advised to avoid fried and fatty foods.  The patient was advised to abide by an anti-GERD diet. The patient was given a pamphlet for anti-GERD.  The patient and I reviewed the anti-GERD diet at length.  I recommend a trial of Omeprazole 20 mg twice a day for 3 months for the symptoms.  \par Atypical Chest Pain: The patient complains of atypical chest pain of unclear etiology.  The patient was advised to follow up with the PMD and cardiologist regarding evaluation for the atypical chest pain. The patient was told of possible etiologies such as cardiac, pulmonary, GI, musculoskeletal, stress and other causes for the atypical chest pain.  The patient agrees and will follow-up with the PMD and cardiologist. \par Constipation: The patient complains of constipation. I recommend a high-fiber diet. I recommend a trial of a probiotic such as Align once a day. I recommend a trial of Metamucil once a day for fiber supplementation. I recommend a trial of Linzess 290 mcg once a day for the constipation. The patient agreed and will followup to reassess the symptoms.  \par Colonoscopy: I recommend a colonoscopy to assess the symptoms and for colonic polyps.  The patient was told of the risks and benefits of the procedure.  The patient was told of the risks of perforation, emergency surgery, bleeding, infections and missed lesions.  The patient is to be on a clear liquid diet the entire day prior to the procedure. The patient is to complete the entire prescribed bowel prep the day prior to the procedure as directed. The patient is to have a COVID 19 PCR nasopharyngeal swab performed at the approved sites 3 days prior to the procedure.  The patient is told not to drive, drink alcohol, use recreational drugs, exercise, or work the day of the procedure.  The patient was told of the need for an escort to accompany the patient home after the procedure. The patient is aware that the procedure may be cancelled if they fail to follow the directions.  The patient agreed and will schedule for the procedure. . The patient is to be n.p.o. after midnight and bowel prep was given.  The patient is to return for the procedure.\par Upper Endoscopy: I recommend an upper endoscopy to assess for peptic ulcer disease versus esophagitis.  The patient was told of the risks and benefits of the procedure.  The patient was told of the risks of perforation, emergency surgery, bleeding, infections and missed lesions. The patient is to have a COVID 19 PCR nasopharyngeal swab performed at the approved sites 3 days prior to the procedure.  The patient is told not to drive, drink alcohol, use recreational drugs, exercise, or work the day of the procedure.  The patient was told of the need for an escort to accompany the patient home after the procedure. The patient is aware that the procedure may be cancelled if they fail to follow the directions.  The patient agreed and will schedule for the procedure.  The patient is to be n.p.o. after midnight.  The patient is to return for the procedure.\par Prior Hospitalization: The patient was previously hospitalized at the [facility] at [place].  The patient had blood work and imaging studies to assess the symptoms.  I reviewed the hospital records, blood work and imaging studies performed in the hospital.\par Prior Endoscopic Procedures: The patient had a prior upper endoscopy and colonoscopy performed by another gastroenterologist, by Dr. Marco Antonio Valero.  I reviewed the prior upper endoscopy and colonoscopy reports. \par Abnormal Imaging Study: The MRI of the abdomen with and without IV contrast performed on April 27, 2022 revealed in the hepatic segment 2 there was a 1.9 cm brightly T2 hyperintense lesion with peripheral nodular enhancement with centripetal contrast filling compatible with a hemangioma and a few small cysts also seen in the left.  There was a 5 mm cystic lesion in the pancreatic body and it appears to communicate with the main pancreatic duct suggestive of a sidebranch IPMN.  The CAT scan of the abdomen pelvis with IV contrast performed on April 23, 2022 revealed an enlarged leiomyomatous uterus again identified, indeterminate liver lesion. \par R/O IPMN: The MRI of the abdomen with and without IV contrast performed on April 27, 2022 revealed in the hepatic segment 2 there was a 1.9 cm brightly T2 hyperintense lesion with peripheral nodular enhancement with centripetal contrast filling compatible with a hemangioma and a few small cysts also seen in the left.  There was a 5 mm cystic lesion in the pancreatic body and it appears to communicate with the main pancreatic duct suggestive of a sidebranch IPMN.  The CAT scan of the abdomen pelvis with IV contrast performed on April 23, 2022 revealed an enlarged leiomyomatous uterus again identified, indeterminate liver lesion. The blood work performed on March 4, 2022 revealed a normal CEA level of 0.7 ng/mL and a normal CA 19-9 of 9 U/ml.  The patient may require an endoscopic ultrasound to assess the possible IPMN.\par Follow-up: The patient is to follow-up in the office in 4 weeks to reassess the symptoms. The patient was told to call the office if any further problems. \par \par \par

## 2022-12-07 NOTE — HISTORY OF PRESENT ILLNESS
[de-identified] : CT angio of the chest with and without IV contrast performed on March 22, 2022 revealed no pulmonary embolism or other acute intrathoracic pathology, subcentimeter hypodensity in the pancreatic body possibly a pseudocyst versus intraductal papillary mucinous neoplasm and indeterminate sclerotic focus in the vertebral body of T7. \par \par The CAT scan of the abdomen pelvis with IV contrast performed on April 23, 2022 revealed an enlarged leiomyomatous uterus again identified, indeterminate liver lesion. \par  [FreeTextEntry1] : The MRI of the abdomen with and without IV contrast performed on April 27, 2022 revealed in the hepatic segment 2 there was a 1.9 cm brightly T2 hyperintense lesion with peripheral nodular enhancement with centripetal contrast filling compatible with a hemangioma and a few small cysts also seen in the left.  There was a 5 mm cystic lesion in the pancreatic body and it appears to communicate with the main pancreatic duct suggestive of a sidebranch IPMN.  \par

## 2022-12-07 NOTE — REVIEW OF SYSTEMS
[Chest Pain] : chest pain [Palpitations] : palpitations [Abdominal Pain] : abdominal pain [Constipation] : constipation [Heartburn] : heartburn [Bloating (gassiness)] : bloating [Arthralgias (joint pain)] : arthralgias [Itching] : itching [Depression] : depression [Negative] : Heme/Lymph [FreeTextEntry3] : blurred vision

## 2022-12-19 ENCOUNTER — NON-APPOINTMENT (OUTPATIENT)
Age: 47
End: 2022-12-19

## 2022-12-22 ENCOUNTER — APPOINTMENT (OUTPATIENT)
Dept: INTERNAL MEDICINE | Facility: CLINIC | Age: 47
End: 2022-12-22

## 2022-12-22 PROCEDURE — 36415 COLL VENOUS BLD VENIPUNCTURE: CPT

## 2022-12-25 LAB
M TB IFN-G BLD-IMP: NEGATIVE
QUANTIFERON TB PLUS MITOGEN MINUS NIL: 3.51 IU/ML
QUANTIFERON TB PLUS NIL: 0.03 IU/ML
QUANTIFERON TB PLUS TB1 MINUS NIL: 0.03 IU/ML
QUANTIFERON TB PLUS TB2 MINUS NIL: 0.03 IU/ML

## 2022-12-27 ENCOUNTER — TRANSCRIPTION ENCOUNTER (OUTPATIENT)
Age: 47
End: 2022-12-27

## 2023-01-17 ENCOUNTER — TRANSCRIPTION ENCOUNTER (OUTPATIENT)
Age: 48
End: 2023-01-17

## 2023-01-18 ENCOUNTER — NON-APPOINTMENT (OUTPATIENT)
Age: 48
End: 2023-01-18

## 2023-02-12 ENCOUNTER — TRANSCRIPTION ENCOUNTER (OUTPATIENT)
Age: 48
End: 2023-02-12

## 2023-02-14 LAB — SARS-COV-2 N GENE NPH QL NAA+PROBE: NOT DETECTED

## 2023-02-16 ENCOUNTER — APPOINTMENT (OUTPATIENT)
Dept: GASTROENTEROLOGY | Facility: HOSPITAL | Age: 48
End: 2023-02-16

## 2023-02-16 ENCOUNTER — EMERGENCY (EMERGENCY)
Facility: HOSPITAL | Age: 48
LOS: 1 days | End: 2023-02-16
Attending: EMERGENCY MEDICINE
Payer: MEDICAID

## 2023-02-16 ENCOUNTER — NON-APPOINTMENT (OUTPATIENT)
Age: 48
End: 2023-02-16

## 2023-02-16 VITALS
OXYGEN SATURATION: 99 % | SYSTOLIC BLOOD PRESSURE: 126 MMHG | DIASTOLIC BLOOD PRESSURE: 76 MMHG | HEART RATE: 76 BPM | TEMPERATURE: 98 F | RESPIRATION RATE: 16 BRPM

## 2023-02-16 VITALS
TEMPERATURE: 99 F | HEART RATE: 72 BPM | RESPIRATION RATE: 18 BRPM | SYSTOLIC BLOOD PRESSURE: 155 MMHG | DIASTOLIC BLOOD PRESSURE: 79 MMHG | OXYGEN SATURATION: 98 %

## 2023-02-16 DIAGNOSIS — Z98.890 OTHER SPECIFIED POSTPROCEDURAL STATES: Chronic | ICD-10-CM

## 2023-02-16 LAB
ALBUMIN SERPL ELPH-MCNC: 3.8 G/DL — SIGNIFICANT CHANGE UP (ref 3.5–5)
ALP SERPL-CCNC: 56 U/L — SIGNIFICANT CHANGE UP (ref 40–120)
ALT FLD-CCNC: 29 U/L DA — SIGNIFICANT CHANGE UP (ref 10–60)
ANION GAP SERPL CALC-SCNC: 4 MMOL/L — LOW (ref 5–17)
AST SERPL-CCNC: 22 U/L — SIGNIFICANT CHANGE UP (ref 10–40)
BASOPHILS # BLD AUTO: 0.02 K/UL — SIGNIFICANT CHANGE UP (ref 0–0.2)
BASOPHILS NFR BLD AUTO: 0.3 % — SIGNIFICANT CHANGE UP (ref 0–2)
BILIRUB SERPL-MCNC: 0.3 MG/DL — SIGNIFICANT CHANGE UP (ref 0.2–1.2)
BUN SERPL-MCNC: 18 MG/DL — SIGNIFICANT CHANGE UP (ref 7–18)
CALCIUM SERPL-MCNC: 10.7 MG/DL — HIGH (ref 8.4–10.5)
CHLORIDE SERPL-SCNC: 109 MMOL/L — HIGH (ref 96–108)
CO2 SERPL-SCNC: 26 MMOL/L — SIGNIFICANT CHANGE UP (ref 22–31)
CREAT SERPL-MCNC: 0.61 MG/DL — SIGNIFICANT CHANGE UP (ref 0.5–1.3)
EGFR: 111 ML/MIN/1.73M2 — SIGNIFICANT CHANGE UP
EOSINOPHIL # BLD AUTO: 0.01 K/UL — SIGNIFICANT CHANGE UP (ref 0–0.5)
EOSINOPHIL NFR BLD AUTO: 0.1 % — SIGNIFICANT CHANGE UP (ref 0–6)
GLUCOSE SERPL-MCNC: 111 MG/DL — HIGH (ref 70–99)
HCT VFR BLD CALC: 36.3 % — SIGNIFICANT CHANGE UP (ref 34.5–45)
HGB BLD-MCNC: 10.8 G/DL — LOW (ref 11.5–15.5)
IMM GRANULOCYTES NFR BLD AUTO: 0.4 % — SIGNIFICANT CHANGE UP (ref 0–0.9)
LYMPHOCYTES # BLD AUTO: 1 K/UL — SIGNIFICANT CHANGE UP (ref 1–3.3)
LYMPHOCYTES # BLD AUTO: 14.1 % — SIGNIFICANT CHANGE UP (ref 13–44)
MCHC RBC-ENTMCNC: 23.5 PG — LOW (ref 27–34)
MCHC RBC-ENTMCNC: 29.8 GM/DL — LOW (ref 32–36)
MCV RBC AUTO: 79.1 FL — LOW (ref 80–100)
MONOCYTES # BLD AUTO: 0.69 K/UL — SIGNIFICANT CHANGE UP (ref 0–0.9)
MONOCYTES NFR BLD AUTO: 9.7 % — SIGNIFICANT CHANGE UP (ref 2–14)
NEUTROPHILS # BLD AUTO: 5.35 K/UL — SIGNIFICANT CHANGE UP (ref 1.8–7.4)
NEUTROPHILS NFR BLD AUTO: 75.4 % — SIGNIFICANT CHANGE UP (ref 43–77)
NRBC # BLD: 0 /100 WBCS — SIGNIFICANT CHANGE UP (ref 0–0)
PLATELET # BLD AUTO: 205 K/UL — SIGNIFICANT CHANGE UP (ref 150–400)
POTASSIUM SERPL-MCNC: 3.8 MMOL/L — SIGNIFICANT CHANGE UP (ref 3.5–5.3)
POTASSIUM SERPL-SCNC: 3.8 MMOL/L — SIGNIFICANT CHANGE UP (ref 3.5–5.3)
PROT SERPL-MCNC: 7.6 G/DL — SIGNIFICANT CHANGE UP (ref 6–8.3)
RBC # BLD: 4.59 M/UL — SIGNIFICANT CHANGE UP (ref 3.8–5.2)
RBC # FLD: 21 % — HIGH (ref 10.3–14.5)
SODIUM SERPL-SCNC: 139 MMOL/L — SIGNIFICANT CHANGE UP (ref 135–145)
WBC # BLD: 7.1 K/UL — SIGNIFICANT CHANGE UP (ref 3.8–10.5)
WBC # FLD AUTO: 7.1 K/UL — SIGNIFICANT CHANGE UP (ref 3.8–10.5)

## 2023-02-16 PROCEDURE — 96374 THER/PROPH/DIAG INJ IV PUSH: CPT

## 2023-02-16 PROCEDURE — 85025 COMPLETE CBC W/AUTO DIFF WBC: CPT

## 2023-02-16 PROCEDURE — 99284 EMERGENCY DEPT VISIT MOD MDM: CPT | Mod: 25

## 2023-02-16 PROCEDURE — 80053 COMPREHEN METABOLIC PANEL: CPT

## 2023-02-16 PROCEDURE — 96375 TX/PRO/DX INJ NEW DRUG ADDON: CPT

## 2023-02-16 PROCEDURE — 99284 EMERGENCY DEPT VISIT MOD MDM: CPT

## 2023-02-16 PROCEDURE — 36415 COLL VENOUS BLD VENIPUNCTURE: CPT

## 2023-02-16 RX ORDER — EPINEPHRINE 0.3 MG/.3ML
0.3 INJECTION INTRAMUSCULAR; SUBCUTANEOUS
Qty: 1 | Refills: 0
Start: 2023-02-16

## 2023-02-16 RX ORDER — DIPHENHYDRAMINE HCL 50 MG
25 CAPSULE ORAL ONCE
Refills: 0 | Status: COMPLETED | OUTPATIENT
Start: 2023-02-16 | End: 2023-02-16

## 2023-02-16 RX ORDER — FAMOTIDINE 10 MG/ML
20 INJECTION INTRAVENOUS ONCE
Refills: 0 | Status: COMPLETED | OUTPATIENT
Start: 2023-02-16 | End: 2023-02-16

## 2023-02-16 RX ORDER — DIPHENHYDRAMINE HCL 50 MG
1 CAPSULE ORAL
Qty: 20 | Refills: 0
Start: 2023-02-16

## 2023-02-16 RX ADMIN — Medication 25 MILLIGRAM(S): at 16:47

## 2023-02-16 RX ADMIN — Medication 125 MILLIGRAM(S): at 16:48

## 2023-02-16 RX ADMIN — FAMOTIDINE 20 MILLIGRAM(S): 10 INJECTION INTRAVENOUS at 16:48

## 2023-02-16 NOTE — ED PROVIDER NOTE - NSICDXPASTMEDICALHX_GEN_ALL_CORE_FT
PAST MEDICAL HISTORY:  Anemia     Asthma     Consumes alcohol at social events     Fracture of right fibula     IBS (irritable bowel syndrome)     Migraine headache     Uterine leiomyoma     Vitamin D deficiency

## 2023-02-16 NOTE — ED ADULT NURSE NOTE - NSIMPLEMENTINTERV_GEN_ALL_ED
Implemented All Universal Safety Interventions:  [place] to call system. Call bell, personal items and telephone within reach. Instruct patient to call for assistance. Room bathroom lighting operational. Non-slip footwear when patient is off stretcher. Physically safe environment: no spills, clutter or unnecessary equipment. Stretcher in lowest position, wheels locked, appropriate side rails in place.

## 2023-02-16 NOTE — ED PROVIDER NOTE - PATIENT PORTAL LINK FT
You can access the FollowMyHealth Patient Portal offered by [facility] by registering at the following website: [email or web address]. By joining [facility]’s FollowMyHealth portal, you will also be able to view your health information using other applications (apps) compatible with our system.

## 2023-02-16 NOTE — ED PROVIDER NOTE - CLINICAL SUMMARY MEDICAL DECISION MAKING FREE TEXT BOX
47-year-old female history of asthma, irritable bowel syndrome, anemia, fibroids, migraines presents with shortness of breath.  Symptoms started after drinking bowel prep.  Possible allergic reaction given rash associated.  Given history of anemia symptomatic anemia is also consideration.  Will check labs Benadryl steroids and observe

## 2023-02-16 NOTE — ED PROVIDER NOTE - NSFOLLOWUPINSTRUCTIONS_ED_ALL_ED_FT
Allergies, Adult      An allergy is a condition in which the body's defense system (immune system) comes in contact with an allergen and reacts to it. An allergen is anything that causes an allergic reaction. Allergens cause the immune system to make proteins for fighting infections (antibodies). These antibodies cause cells to release chemicals called histamines that set off the symptoms of an allergic reaction.    Allergies often affect the nasal passages (allergic rhinitis), eyes (allergic conjunctivitis), skin (atopic dermatitis), and stomach. Allergies can be mild, moderate, or severe. They cannot spread from person to person. Allergies can develop at any age and may be outgrown.      What are the causes?    This condition is caused by allergens. Common allergens include:  •Outdoor allergens, such as pollen, car fumes, and mold.      •Indoor allergens, such as dust, smoke, mold, and pet dander.      •Other allergens, such as foods, medicines, scents, insect bites or stings, and other skin irritants.        What increases the risk?    You are more likely to develop this condition if you have:  •Family members with allergies.      •Family members who have any condition that may be caused by allergens, such as asthma. This may make you more likely to have other allergies.        What are the signs or symptoms?    Symptoms of this condition depend on the severity of the allergy.    Mild to moderate symptoms     •Runny nose, stuffy nose (nasal congestion), or sneezing.      •Itchy mouth, ears, or throat.      •A feeling of mucus dripping down the back of your throat (postnasal drip).      •Sore throat.      •Itchy, red, watery, or puffy eyes.      •Skin rash, or itchy, red, swollen areas of skin (hives).      •Stomach cramps or bloating.      Severe symptoms     Severe allergies to food, medicine, or insect bites may cause anaphylaxis, which can be life-threatening. Symptoms include:  •A red (flushed) face.      •Wheezing or coughing.      •Swollen lips, tongue, or mouth.      •Tight or swollen throat.      •Chest pain or tightness, or rapid heartbeat.      •Trouble breathing or shortness of breath.      •Pain in the abdomen, vomiting, or diarrhea.      •Dizziness or fainting.        How is this diagnosed?    This condition is diagnosed based on your symptoms, your family and medical history, and a physical exam. You may also have tests, including:•Skin tests to see how your skin reacts to allergens that may be causing your symptoms. Tests include:  •Skin prick test. For this test, an allergen is introduced to your body through a small opening in the skin.      •Intradermal skin test. For this test, a small amount of allergen is injected under the first layer of your skin.      •Patch test. For this test, a small amount of allergen is placed on your skin. The area is covered and then checked after a few days.        •Blood tests.      •A challenge test. For this test, you will eat or breathe in a small amount of allergen to see if you have an allergic reaction.      You may also be asked to:  •Keep a food diary. This is a record of all the foods, drinks, and symptoms you have in a day.    •Try an elimination diet. To do this:  •Remove certain foods from your diet.      •Add those foods back one by one to find out if any foods cause an allergic reaction.          How is this treated?                  Treatment for allergies depends on your symptoms. Treatment may include:  •Cold, wet cloths (cold compresses) to soothe itching and swelling.      •Eye drops or nasal sprays.      •Nasal irrigation to help clear your mucus or keep the nasal passages moist.      •A humidifier to add moisture to the air.      •Skin creams to treat rashes or itching.      •Oral antihistamines or other medicines to block the reaction or to treat inflammation.      •Diet changes to remove foods that cause allergies.    •Being exposed again and again to tiny amounts of allergens to help you build a defense against it (tolerance). This is called immunotherapy. Examples include:  •Allergy shot. You receive an injection that contains an allergen.      •Sublingual immunotherapy. You take a small dose of allergen under your tongue.        •Emergency injection for anaphylaxis. You give yourself a shot using a syringe (auto-injector) that contains the amount of medicine you need. Your health care provider will teach you how to give yourself an injection.        Follow these instructions at home:      Medicines      •Take or apply over-the-counter and prescription medicines only as told by your health care provider.      •Always carry your auto-injector pen if you are at risk of anaphylaxis. Give yourself an injection as told by your health care provider.      Eating and drinking     •Follow instructions from your health care provider about eating or drinking restrictions.      •Drink enough fluid to keep your urine pale yellow.      General instructions     •Wear a medical alert bracelet or necklace to let others know that you have had anaphylaxis before.      •Avoid known allergens whenever possible.      •Keep all follow-up visits as told by your health care provider. This is important.        Contact a health care provider if:    •Your symptoms do not get better with treatment.        Get help right away if:  •You have symptoms of anaphylaxis. These include:  •Swollen mouth, tongue, or throat.      •Pain or tightness in your chest.      •Trouble breathing or shortness of breath.      •Dizziness or fainting.      •Severe abdominal pain, vomiting, or diarrhea.        These symptoms may represent a serious problem that is an emergency. Do not wait to see if the symptoms will go away. Get medical help right away. Call your local emergency services (911 in the [place] Do not drive yourself to the hospital.       Summary    •Take or apply over-the-counter and prescription medicines only as told by your health care provider.      •Avoid known allergens when possible.      •Always carry your auto-injector pen if you are at risk of anaphylaxis. Give yourself an injection as told by your health care provider.      •Wear a medical alert bracelet or necklace to let others know that you have had anaphylaxis before.      •Anaphylaxis is a life-threatening emergency. Get help right away.      This information is not intended to replace advice given to you by your health care provider. Make sure you discuss any questions you have with your health care provider.      Document Revised: 08/16/2021 Document Reviewed: 10/28/2020    Elsevier Patient Education © 2022 [employer] Inc[employer]

## 2023-02-16 NOTE — ED ADULT NURSE NOTE - OBJECTIVE STATEMENT
Pt is here for unknown source of allergic reaction. Pt stated she started to have episodes of hives and chest tightness yesterday night. Ambulance came and gave her epi pen and was sent to [facility]. Come back to  today for mild hives and SOB. Pt is able to speak full sentences and walking short distance. AAOx4. GCS15.

## 2023-02-16 NOTE — ED PROVIDER NOTE - TEMPLATE, MLM
-- DO NOT REPLY / DO NOT REPLY ALL --  -- Message is from the [facility]--    Patient is requesting a medication refill - medication is on active medication list    Patient is currently OUT of the requested medication.    Was Medication Pended?  Yes.    Rx Name and Dose:     hydrochlorothiazide (HYDRODIURIL) 12.5 MG tablet 12.5 mg, Oral, DAILY        Summary: Take 1 tablet by mouth daily.  Eprescribe, Disp-90 tablet, R-2   Dose, Frequency: 12.5 mg, DAILY  Start: 2/6/2019  Report  Long-term:   Pharmacy: [facility]/pharmacy #[id number] [facility], [place] [id number] [address]  Med Dose History       Patient Sig: Take 1 tablet by mouth daily.       Ordered on: 2/6/2019       Authorized by: PENNIE HAMM       Dispense: 90 tablet       Refills: 2 ordered              Duration: 90 days    Pharmacy  [facility]Pharm[id number] [place] [facility] [address]    Patient confirmed the above pharmacy as correct?  Yes    Caller Information       Type Contact Phone    02/03/2020 09:51 AM Phone (Incoming) Debra HILL (Self) [phone] (H)          Alternative phone number:   NONE    Turnaround time given to caller:   \"This message will be sent to [state Provider's name]. The clinical team will fulfill your request as soon as they review your message when the office opens tomorrow.\"   General

## 2023-02-16 NOTE — ED PROVIDER NOTE - OBJECTIVE STATEMENT
47-year-old female history of asthma, irritable bowel syndrome, anemia, uterine fibroids, migraine headaches, presents with shortness of breath and possible allergic reaction.  Yesterday patient was drinking bowel prep in preparation for colonoscopy.  Around an hour after starting the prep patient's began to feel itchy.  Patient then began to feel short of breath.  EMS was called.  As per patient EMS administered epinephrine and steroids via IV and transported her to [facility].  Patient states she waited at [facility] all night by 6 AM had not been seen so left and went home.  Patient said her symptoms had resolved at that time.  Patient went home went to bed and sometime this afternoon felt the symptoms recurring.  Patient began to feel itchy.  Patient did not feel short of breath.  Patient was concerned symptoms would worsen so came to ER for further evaluation.  Patient did not take any medications prior to coming to the ED.  In ED patient feeling itchy, however denying shortness of breath.  No lip swelling, no tongue swelling, no change in voice, patient able to tolerate oral secretions.

## 2023-02-17 ENCOUNTER — NON-APPOINTMENT (OUTPATIENT)
Age: 48
End: 2023-02-17

## 2023-02-20 LAB — SARS-COV-2 N GENE NPH QL NAA+PROBE: NOT DETECTED

## 2023-02-21 ENCOUNTER — RESULT REVIEW (OUTPATIENT)
Age: 48
End: 2023-02-21

## 2023-02-21 ENCOUNTER — APPOINTMENT (OUTPATIENT)
Dept: GASTROENTEROLOGY | Facility: HOSPITAL | Age: 48
End: 2023-02-21

## 2023-02-21 ENCOUNTER — TRANSCRIPTION ENCOUNTER (OUTPATIENT)
Age: 48
End: 2023-02-21

## 2023-02-21 ENCOUNTER — OUTPATIENT (OUTPATIENT)
Dept: OUTPATIENT SERVICES | Facility: HOSPITAL | Age: 48
LOS: 1 days | End: 2023-02-21
Payer: MEDICAID

## 2023-02-21 VITALS
RESPIRATION RATE: 17 BRPM | HEIGHT: 69 IN | WEIGHT: 162.04 LBS | OXYGEN SATURATION: 100 % | DIASTOLIC BLOOD PRESSURE: 68 MMHG | SYSTOLIC BLOOD PRESSURE: 100 MMHG | TEMPERATURE: 98 F | HEART RATE: 68 BPM

## 2023-02-21 VITALS
SYSTOLIC BLOOD PRESSURE: 124 MMHG | HEART RATE: 65 BPM | OXYGEN SATURATION: 100 % | RESPIRATION RATE: 16 BRPM | DIASTOLIC BLOOD PRESSURE: 66 MMHG

## 2023-02-21 DIAGNOSIS — K59.04 CHRONIC IDIOPATHIC CONSTIPATION: ICD-10-CM

## 2023-02-21 DIAGNOSIS — K21.9 GASTRO-ESOPHAGEAL REFLUX DISEASE WITHOUT ESOPHAGITIS: ICD-10-CM

## 2023-02-21 DIAGNOSIS — D50.9 IRON DEFICIENCY ANEMIA, UNSPECIFIED: ICD-10-CM

## 2023-02-21 DIAGNOSIS — Z98.890 OTHER SPECIFIED POSTPROCEDURAL STATES: Chronic | ICD-10-CM

## 2023-02-21 LAB — HCG UR QL: NEGATIVE — SIGNIFICANT CHANGE UP

## 2023-02-21 PROCEDURE — 45378 DIAGNOSTIC COLONOSCOPY: CPT

## 2023-02-21 PROCEDURE — 81025 URINE PREGNANCY TEST: CPT

## 2023-02-21 PROCEDURE — 88305 TISSUE EXAM BY PATHOLOGIST: CPT | Mod: 26

## 2023-02-21 PROCEDURE — 43239 EGD BIOPSY SINGLE/MULTIPLE: CPT

## 2023-02-21 PROCEDURE — 88305 TISSUE EXAM BY PATHOLOGIST: CPT

## 2023-02-21 PROCEDURE — 88312 SPECIAL STAINS GROUP 1: CPT

## 2023-02-21 PROCEDURE — 88312 SPECIAL STAINS GROUP 1: CPT | Mod: 26

## 2023-02-21 RX ORDER — SODIUM CHLORIDE 9 MG/ML
500 INJECTION INTRAMUSCULAR; INTRAVENOUS; SUBCUTANEOUS
Refills: 0 | Status: COMPLETED | OUTPATIENT
Start: 2023-02-21 | End: 2023-02-21

## 2023-02-21 RX ADMIN — SODIUM CHLORIDE 30 MILLILITER(S): 9 INJECTION INTRAMUSCULAR; INTRAVENOUS; SUBCUTANEOUS at 16:11

## 2023-02-21 NOTE — ASU DISCHARGE PLAN (ADULT/PEDIATRIC) - NS MD DC FALL RISK RISK
For information on Fall & Injury Prevention, visit: [email or web address] OR  [email or web address] OR  https://www.cdc.gov/steadi/patient.html

## 2023-02-21 NOTE — ASU PATIENT PROFILE, ADULT - FALL HARM RISK - RISK INTERVENTIONS
Communicate Fall Risk and Risk Factors to all staff, patient, and family/Reinforce activity limits and safety measures with patient and family/Review medications for side effects contributing to fall risk/Visual Cue: Yellow wristband/Bed in lowest position, wheels locked, appropriate side rails in place/Call bell, personal items and telephone in reach/Instruct patient to call for assistance before getting out of bed or chair/Non-slip footwear when patient is out of bed/[place] to call system/Physically safe environment - no spills, clutter or unnecessary equipment/Purposeful Proactive Rounding/Room/bathroom lighting operational, light cord in reach

## 2023-02-23 LAB — SURGICAL PATHOLOGY STUDY: SIGNIFICANT CHANGE UP

## 2023-03-03 ENCOUNTER — APPOINTMENT (OUTPATIENT)
Dept: DERMATOLOGY | Facility: CLINIC | Age: 48
End: 2023-03-03
Payer: MEDICAID

## 2023-03-03 VITALS — WEIGHT: 167 LBS | HEIGHT: 69 IN | BODY MASS INDEX: 24.73 KG/M2

## 2023-03-03 DIAGNOSIS — D23.9 OTHER BENIGN NEOPLASM OF SKIN, UNSPECIFIED: ICD-10-CM

## 2023-03-03 DIAGNOSIS — L50.9 URTICARIA, UNSPECIFIED: ICD-10-CM

## 2023-03-03 PROCEDURE — 99203 OFFICE O/P NEW LOW 30 MIN: CPT

## 2023-03-03 RX ORDER — CETIRIZINE HYDROCHLORIDE 10 MG/1
10 TABLET, FILM COATED ORAL DAILY
Qty: 30 | Refills: 0 | Status: ACTIVE | COMMUNITY
Start: 2023-03-03 | End: 1900-01-01

## 2023-03-06 ENCOUNTER — LABORATORY RESULT (OUTPATIENT)
Age: 48
End: 2023-03-06

## 2023-03-06 ENCOUNTER — APPOINTMENT (OUTPATIENT)
Dept: INTERNAL MEDICINE | Facility: CLINIC | Age: 48
End: 2023-03-06
Payer: MEDICAID

## 2023-03-06 VITALS
OXYGEN SATURATION: 99 % | WEIGHT: 167 LBS | RESPIRATION RATE: 15 BRPM | TEMPERATURE: 98 F | HEIGHT: 69 IN | HEART RATE: 71 BPM | SYSTOLIC BLOOD PRESSURE: 128 MMHG | DIASTOLIC BLOOD PRESSURE: 83 MMHG | BODY MASS INDEX: 24.73 KG/M2

## 2023-03-06 DIAGNOSIS — G43.909 MIGRAINE, UNSPECIFIED, NOT INTRACTABLE, W/OUT STATUS MIGRAINOSUS: ICD-10-CM

## 2023-03-06 PROCEDURE — 99213 OFFICE O/P EST LOW 20 MIN: CPT

## 2023-03-06 RX ORDER — NAPROXEN 375 MG/1
375 TABLET ORAL
Qty: 20 | Refills: 5 | Status: DISCONTINUED | COMMUNITY
Start: 2021-01-05 | End: 2023-03-06

## 2023-03-06 RX ORDER — METRONIDAZOLE 7.5 MG/G
0.75 GEL VAGINAL
Qty: 1 | Refills: 0 | Status: DISCONTINUED | COMMUNITY
Start: 2022-04-04 | End: 2023-03-06

## 2023-03-06 RX ORDER — OMEPRAZOLE 20 MG/1
20 CAPSULE, DELAYED RELEASE ORAL
Qty: 60 | Refills: 3 | Status: DISCONTINUED | COMMUNITY
Start: 2022-12-07 | End: 2023-03-06

## 2023-03-06 RX ORDER — POLYETHYLENE GLYCOL 3350 AND ELECTROLYTES WITH LEMON FLAVOR 236; 22.74; 6.74; 5.86; 2.97 G/4L; G/4L; G/4L; G/4L; G/4L
236 POWDER, FOR SOLUTION ORAL
Qty: 1 | Refills: 0 | Status: DISCONTINUED | COMMUNITY
Start: 2023-02-09 | End: 2023-03-06

## 2023-03-06 RX ORDER — POLYETHYLENE GLYCOL 3350 17 G/17G
17 POWDER, FOR SOLUTION ORAL TWICE DAILY
Qty: 117 | Refills: 0 | Status: DISCONTINUED | COMMUNITY
Start: 2022-04-14 | End: 2023-03-06

## 2023-03-06 RX ORDER — POLYETHYLENE GLYCOL 3350 AND ELECTROLYTES WITH LEMON FLAVOR 236; 22.74; 6.74; 5.86; 2.97 G/4L; G/4L; G/4L; G/4L; G/4L
236 POWDER, FOR SOLUTION ORAL
Qty: 1 | Refills: 0 | Status: DISCONTINUED | COMMUNITY
Start: 2022-12-07 | End: 2023-03-06

## 2023-03-06 RX ORDER — SENNOSIDES 8.6 MG/1
8.6 TABLET, COATED ORAL
Qty: 60 | Refills: 0 | Status: DISCONTINUED | COMMUNITY
Start: 2022-04-25 | End: 2023-03-06

## 2023-03-06 RX ORDER — LINACLOTIDE 290 UG/1
290 CAPSULE, GELATIN COATED ORAL
Qty: 30 | Refills: 3 | Status: DISCONTINUED | COMMUNITY
Start: 2022-12-07 | End: 2023-03-06

## 2023-03-06 RX ORDER — NORETHINDRONE 0.35 MG/1
0.35 TABLET ORAL DAILY
Qty: 1 | Refills: 4 | Status: DISCONTINUED | COMMUNITY
Start: 2022-08-08 | End: 2023-03-06

## 2023-03-06 RX ORDER — POLYETHYLENE GLYCOL-3350 AND ELECTROLYTES WITH FLAVOR PACK 240; 5.84; 2.98; 6.72; 22.72 G/278.26G; G/278.26G; G/278.26G; G/278.26G; G/278.26G
240 POWDER, FOR SOLUTION ORAL
Qty: 1 | Refills: 0 | Status: DISCONTINUED | COMMUNITY
Start: 2022-05-05 | End: 2023-03-06

## 2023-03-06 RX ORDER — SODIUM SULFATE, MAGNESIUM SULFATE, AND POTASSIUM CHLORIDE 17.75; 2.7; 2.25 G/1; G/1; G/1
1479-225-188 TABLET ORAL TWICE DAILY
Qty: 24 | Refills: 0 | Status: DISCONTINUED | COMMUNITY
Start: 2023-02-17 | End: 2023-03-06

## 2023-03-06 RX ORDER — CEFPODOXIME PROXETIL 200 MG/1
200 TABLET, FILM COATED ORAL
Qty: 14 | Refills: 0 | Status: DISCONTINUED | COMMUNITY
Start: 2022-06-21 | End: 2023-03-06

## 2023-03-06 RX ORDER — IRON/IRON ASP GLY/FA/MV-MIN 38 125-25-1MG
TABLET ORAL
Refills: 0 | Status: DISCONTINUED | COMMUNITY
End: 2023-03-06

## 2023-03-06 NOTE — HEALTH RISK ASSESSMENT
[No] : In the past 12 months have you used drugs other than those required for medical reasons? No [No falls in past year] : Patient reported no falls in the past year [Never] : Never [de-identified] : GI

## 2023-03-06 NOTE — HISTORY OF PRESENT ILLNESS
[de-identified] : 47 year old  female patient with history of stable Asthma, Iron Deficiency Anemia, Lumbar Radiculopathy, Migraine, history as stated, presented for follow up examination. Patient is compliant with all medications. ROS as stated.\par

## 2023-03-06 NOTE — ASSESSMENT
[FreeTextEntry1] : 47 year old female found to have stable Asthma, Iron Deficiency Anemia, Lumbar Radiculopathy, Migraine,with the current prescription regimen as recommended, diet and life style modifications, as counseled. Prior results reviewed, interpreted and discussed with the patient during today's examination, as appropriate. Follow up, treatment plan and tests, as ordered.\par \par Patient was unable to perform at work from 3/5/23 to 3/6/23 due to Urticaria , may resume full time work from 3/7/23, as recommended.\par \par Total time spent : 25 minutes\par Including:\par Preparation prior to visit - Reviewing prior record, results of tests and Consultation Reports as applicable\par Conducting an appropriate H & P during today's encounter\par Appropriate orders for tests, medications and procedures, as applicable\par Counseling patient \par Note completion\par

## 2023-03-08 DIAGNOSIS — E83.52 HYPERCALCEMIA: ICD-10-CM

## 2023-03-08 LAB
ALBUMIN SERPL ELPH-MCNC: 4.3 G/DL
ALP BLD-CCNC: 61 U/L
ALT SERPL-CCNC: 24 U/L
ANION GAP SERPL CALC-SCNC: 10 MMOL/L
AST SERPL-CCNC: 25 U/L
BASOPHILS # BLD AUTO: 0.04 K/UL
BASOPHILS NFR BLD AUTO: 1 %
BILIRUB SERPL-MCNC: 0.3 MG/DL
BUN SERPL-MCNC: 14 MG/DL
CALCIUM SERPL-MCNC: 10.9 MG/DL
CHLORIDE SERPL-SCNC: 104 MMOL/L
CHOLEST SERPL-MCNC: 194 MG/DL
CO2 SERPL-SCNC: 25 MMOL/L
CREAT SERPL-MCNC: 0.6 MG/DL
EGFR: 111 ML/MIN/1.73M2
EOSINOPHIL # BLD AUTO: 0.1 K/UL
EOSINOPHIL NFR BLD AUTO: 2.4 %
ESTIMATED AVERAGE GLUCOSE: 117 MG/DL
FOLATE SERPL-MCNC: 10.1 NG/ML
GGT SERPL-CCNC: 9 U/L
GLUCOSE SERPL-MCNC: 87 MG/DL
HBA1C MFR BLD HPLC: 5.7 %
HCT VFR BLD CALC: 40.4 %
HDLC SERPL-MCNC: 57 MG/DL
HGB BLD-MCNC: 12 G/DL
IMM GRANULOCYTES NFR BLD AUTO: 0 %
IRON SATN MFR SERPL: 10 %
IRON SERPL-MCNC: 44 UG/DL
LDLC SERPL CALC-MCNC: 121 MG/DL
LYMPHOCYTES # BLD AUTO: 1.57 K/UL
LYMPHOCYTES NFR BLD AUTO: 37.8 %
MAN DIFF?: NORMAL
MCHC RBC-ENTMCNC: 24.5 PG
MCHC RBC-ENTMCNC: 29.7 GM/DL
MCV RBC AUTO: 82.6 FL
MONOCYTES # BLD AUTO: 0.53 K/UL
MONOCYTES NFR BLD AUTO: 12.8 %
NEUTROPHILS # BLD AUTO: 1.91 K/UL
NEUTROPHILS NFR BLD AUTO: 46 %
NONHDLC SERPL-MCNC: 138 MG/DL
PLATELET # BLD AUTO: 164 K/UL
POTASSIUM SERPL-SCNC: 4.5 MMOL/L
PROT SERPL-MCNC: 7.2 G/DL
RBC # BLD: 4.89 M/UL
RBC # FLD: 21.4 %
SODIUM SERPL-SCNC: 138 MMOL/L
TIBC SERPL-MCNC: 453 UG/DL
TRIGL SERPL-MCNC: 82 MG/DL
UIBC SERPL-MCNC: 409 UG/DL
VIT B12 SERPL-MCNC: 495 PG/ML
WBC # FLD AUTO: 4.15 K/UL

## 2023-03-17 ENCOUNTER — APPOINTMENT (OUTPATIENT)
Dept: ORTHOPEDIC SURGERY | Facility: CLINIC | Age: 48
End: 2023-03-17

## 2023-03-20 NOTE — ED PROVIDER NOTE - NS ED MD DISPO DISCHARGE CCDA
Stefania Guthrie MD at [facility] EGD TRANSORAL BIOPSY SINGLE/MULTIPLE N/A 03/18/2017    EGD ESOPHAGOGASTRODUODENOSCOPY with cold biopsy and photos performed by Stefania Guthrie MD at [facility] [address] Rosie Luque MD   tiotropium (Carlos Manuel Wick) Rosie Luque MD   Insulin Pen Needle ([facility] [facility], Mary Kay Lopez MD Patient/Caregiver provided printed discharge information.

## 2023-03-22 ENCOUNTER — APPOINTMENT (OUTPATIENT)
Dept: ORTHOPEDIC SURGERY | Facility: CLINIC | Age: 48
End: 2023-03-22

## 2023-03-27 ENCOUNTER — NON-APPOINTMENT (OUTPATIENT)
Age: 48
End: 2023-03-27

## 2023-04-03 ENCOUNTER — EMERGENCY (EMERGENCY)
Facility: HOSPITAL | Age: 48
LOS: 1 days | Discharge: HOME | End: 2023-04-03
Attending: EMERGENCY MEDICINE
Payer: MEDICAID

## 2023-04-03 VITALS
HEART RATE: 75 BPM | TEMPERATURE: 98 F | RESPIRATION RATE: 18 BRPM | OXYGEN SATURATION: 97 % | SYSTOLIC BLOOD PRESSURE: 124 MMHG | DIASTOLIC BLOOD PRESSURE: 74 MMHG

## 2023-04-03 VITALS
WEIGHT: 166.89 LBS | OXYGEN SATURATION: 98 % | HEIGHT: 69 IN | DIASTOLIC BLOOD PRESSURE: 78 MMHG | HEART RATE: 78 BPM | TEMPERATURE: 98 F | SYSTOLIC BLOOD PRESSURE: 126 MMHG | RESPIRATION RATE: 18 BRPM

## 2023-04-03 DIAGNOSIS — Z98.890 OTHER SPECIFIED POSTPROCEDURAL STATES: Chronic | ICD-10-CM

## 2023-04-03 LAB
HMPV RNA SPEC QL NAA+PROBE: DETECTED
RAPID RVP RESULT: DETECTED
SARS-COV-2 RNA SPEC QL NAA+PROBE: SIGNIFICANT CHANGE UP

## 2023-04-03 PROCEDURE — 94640 AIRWAY INHALATION TREATMENT: CPT

## 2023-04-03 PROCEDURE — 99284 EMERGENCY DEPT VISIT MOD MDM: CPT

## 2023-04-03 PROCEDURE — 0225U NFCT DS DNA&RNA 21 SARSCOV2: CPT

## 2023-04-03 PROCEDURE — 99283 EMERGENCY DEPT VISIT LOW MDM: CPT | Mod: 25

## 2023-04-03 RX ORDER — IBUPROFEN 200 MG
600 TABLET ORAL ONCE
Refills: 0 | Status: COMPLETED | OUTPATIENT
Start: 2023-04-03 | End: 2023-04-03

## 2023-04-03 RX ORDER — IPRATROPIUM/ALBUTEROL SULFATE 18-103MCG
3 AEROSOL WITH ADAPTER (GRAM) INHALATION
Refills: 0 | Status: COMPLETED | OUTPATIENT
Start: 2023-04-03 | End: 2023-04-03

## 2023-04-03 RX ADMIN — Medication 600 MILLIGRAM(S): at 13:21

## 2023-04-03 RX ADMIN — Medication 3 MILLILITER(S): at 12:48

## 2023-04-03 RX ADMIN — Medication 3 MILLILITER(S): at 12:50

## 2023-04-03 RX ADMIN — Medication 3 MILLILITER(S): at 12:52

## 2023-04-03 RX ADMIN — Medication 600 MILLIGRAM(S): at 12:51

## 2023-04-03 RX ADMIN — Medication 40 MILLIGRAM(S): at 12:51

## 2023-04-03 NOTE — ED ADULT NURSE NOTE - OBJECTIVE STATEMENT
As per pt, c/o generalized body pain and congestion x5 days with tightness in the chest and sob started this morning. No reports of n/v/d. Noted non-labored breathing and O2 sat 100% on room air. Pt denies all other symptoms.

## 2023-04-03 NOTE — ED ADULT TRIAGE NOTE - CHIEF COMPLAINT QUOTE
c/o fever ,cough body aches x 4 days , short of breath today , asthma acting up not getting relief w/ inhalers

## 2023-04-03 NOTE — ED PROVIDER NOTE - CLINICAL SUMMARY MEDICAL DECISION MAKING FREE TEXT BOX
Patient presenting complaining of asthma symptoms likely in setting of underlying viral illness.  We will treat with steroids and inhalers in the emergency department also NSAIDs for systemic symptoms and RVP.  Will reevaluate response to treatment.

## 2023-04-03 NOTE — ED PROVIDER NOTE - PATIENT PORTAL LINK FT
You can access the FollowMyHealth Patient Portal offered by [facility] by registering at the following website: [email or web address]. By joining [employer]’s FollowMyHealth portal, you will also be able to view your health information using other applications (apps) compatible with our system.

## 2023-04-03 NOTE — ED ADULT NURSE NOTE - NS ED NURSE IV DC DT
Summary of Care: 14 - 14

                             Created on: 2107



GLORY WAN

External Reference #: [id number]

: 1993

Sex: Female



Demographics







                          Address                   [id number]

[place]  [address]-

 

                          Home Phone                [phone]

 

                          Preferred Language        English

 

                          Marital Status            Single

 

                          Hindu Affiliation     Unknown

 

                          Race                      Other

 

                          Ethnic Group              Non-





Author







                          Organization              Unknown

 

                          Address                   Unknown

 

                          Phone                     Unavailable







Encounter





HQ Monalisa_facundo(FIN) [id number] Date(s): 14 - 14

[facility] [address] [address]

Discharge Disposition: Home

Physician Attending: Chace Damon MD





Reason for Visit





CHEST PAIN



Vital Signs







 



                           Most recent to            1



                                         oldest [Reference 



                                         Range]: 

 

 



                           Height                    152.4 cm



                                         (14 10:23 PM)

 

 



                           Temperature Oral          98.5 DegF



                           [96.4-99.1 DegF]          (14 10:23 PM)

 

 



                           Systolic Blood            114 mmHg



                           Pressure [          (14 10:23 PM)



                                         mmHg] 

 

 



                           Diastolic Blood           72 mmHg



                           Pressure [60-90           (14 10:23 PM)



                                         mmHg] 

 

 



                           Respiratory Rate          22 BRMIN



                           [14-20 BRMIN]             *HI*



                                         (14 10:23 PM)

 

 



                           Peripheral Pulse          104 bpm



                           Rate [ bpm]         *HI*



                                         (14 10:23 PM)

 

 



                           Weight                    90.909 kg



                                         (14 10:23 PM)

 

 



                           Body Mass Index           39.14 m2



                                         (14 10:23 PM)







Problem List





No data available for this section



Allergies, Adverse Reactions, Alerts







   



                 Substance       Reaction        Severity        Status

 

   



                           sulfa drugs               Active







Medications





No data available for this section



Results





URINE CHEM





 



                           Most recent to            1



                                         oldest [Reference 



                                         Range]: 

 

 



                           U Preg [Negative]         Negative



                                         (14 10:27 PM)







URINE AND STOOL





 



                           Most recent to            1



                                         oldest [Reference 



                                         Range]: 

 

 



                           UA Turbidity [Clear]      Marked



                                         *ABN*



                                         (14 10:27 PM)

 

 



                           UA Color [Yellow]         Yellow



                                         *NA*



                                         (14 10:27 PM)

 

 



                           UA pH [5.0-8.0]           6.0



                                         (14 10:27 PM)

 

 



                           UA Spec Grav              1.019



                           [<=1.030]                 (14 10:27 PM)

 

 



                           UA Glucose [Negative      Negative mg/dL



                           mg/dL]                    *NA*



                                         (14 10:27 PM)

 

 



                           UA Blood [Negative]       Large



                                         *ABN*



                                         (14 10:27 PM)

 

 



                           UA Ketones [Negative      Negative mg/dL



                           mg/dL]                    *NA*



                                         (14 10:27 PM)

 

 



                           UA Protein [Negative      Negative mg/dL



                           mg/dL]                    (14 10:27 PM)

 

 



                           UA Urobilinogen           <=1.0 mg/dL



                           [0.1-1.0 mg/dL]           *NA*



                                         (14 10:27 PM)

 

 



                           UA Bili [Negative]        Negative



                                         *NA*



                                         (14 10:27 PM)

 

 



                           UA Leuk Est               Trace



                           [Negative]                *ABN*



                                         (14 10:27 PM)

 

 



                           UA Nitrite                Negative



                           [Negative]                (14 10:27 PM)

 

 



                           UA WBC [0-5 /HPF]         12 /HPF



                                         *HI*



                                         (14 10:27 PM)

 

 



                           UA RBC [0-2 /HPF]         5 /HPF



                                         *HI*



                                         (14 10:27 PM)

 

 



                           UA Bacteria [None         Occasional /HPF



                           Seen /HPF]                *NA*



                                         (14 10:27 PM)

 

 



                           UA Sq Epi [Few /LPF]      Many /LPF



                                         *ABN*



                                         (14 10:27 PM)

 

 



                           UA Amorph Corinna [None      Occasional /HPF



                           Seen /HPF]                *NA*



                                         (14 10:27 PM)







Medications Administered During Your Visit





No data available for this section



Immunizations





No data available for this section 03-Apr-2023 15:00

## 2023-04-03 NOTE — ED PROVIDER NOTE - PHYSICAL EXAMINATION
Exam:  General: Patient well appearing, vital signs within normal limits  HEENT: airway patent with moist mucous membranes  Cardiac: RRR S1/S2 with strong peripheral pulses  Respiratory: diffuse coarse expiratory wheezing  GI: abdomen soft, non tender, non distended  Neuro: no gross neurologic deficits  Skin: warm, well perfused  Psych: normal mood and affect

## 2023-04-03 NOTE — ED PROVIDER NOTE - OBJECTIVE STATEMENT
47-year-old woman with a history of asthma presenting complaining of shortness of breath.  She reports for the past 3 to 4 days she has been having fevers, chills, and nonproductive cough associated with body aches and pains.  No known sick contacts.  She has been using her home albuterol inhaler with some relief.  Today the shortness of breath became worse while she is at work so she came to the emergency room.  Her last steroid use was a few months ago after a prior emergency department visit.  Denies smoking.

## 2023-04-03 NOTE — ED PROVIDER NOTE - NSFOLLOWUPINSTRUCTIONS_ED_ALL_ED_FT
Thank you for choosing [facility] healthcare.    You were seen in the emergency room for flulike symptoms and asthma exacerbation.  You were treated with nebulizers and steroids and felt improved.  You were swabbed for possible causes of your viral illness and the results of that test was still pending when you were discharged.  You can continue taking your rescue inhaler at home as needed up to 2 puffs every 4 hours.  You were also prescribed steroids to help prevent your symptoms from recurring.  Please return to the emergency room you are having worsening shortness of breath despite all of the above.

## 2023-04-05 ENCOUNTER — APPOINTMENT (OUTPATIENT)
Dept: GASTROENTEROLOGY | Facility: CLINIC | Age: 48
End: 2023-04-05

## 2023-04-05 VITALS
SYSTOLIC BLOOD PRESSURE: 125 MMHG | DIASTOLIC BLOOD PRESSURE: 81 MMHG | BODY MASS INDEX: 24.73 KG/M2 | TEMPERATURE: 98.6 F | OXYGEN SATURATION: 98 % | HEART RATE: 70 BPM | WEIGHT: 167 LBS | HEIGHT: 69 IN

## 2023-04-07 ENCOUNTER — APPOINTMENT (OUTPATIENT)
Dept: ORTHOPEDIC SURGERY | Facility: CLINIC | Age: 48
End: 2023-04-07
Payer: MEDICAID

## 2023-04-07 ENCOUNTER — LABORATORY RESULT (OUTPATIENT)
Age: 48
End: 2023-04-07

## 2023-04-07 ENCOUNTER — APPOINTMENT (OUTPATIENT)
Dept: GASTROENTEROLOGY | Facility: CLINIC | Age: 48
End: 2023-04-07
Payer: MEDICAID

## 2023-04-07 VITALS
HEIGHT: 69 IN | BODY MASS INDEX: 24.73 KG/M2 | SYSTOLIC BLOOD PRESSURE: 119 MMHG | HEART RATE: 61 BPM | WEIGHT: 167 LBS | DIASTOLIC BLOOD PRESSURE: 84 MMHG

## 2023-04-07 VITALS
DIASTOLIC BLOOD PRESSURE: 78 MMHG | BODY MASS INDEX: 24.14 KG/M2 | HEIGHT: 69 IN | OXYGEN SATURATION: 100 % | TEMPERATURE: 96.6 F | SYSTOLIC BLOOD PRESSURE: 122 MMHG | WEIGHT: 163 LBS | HEART RATE: 71 BPM

## 2023-04-07 DIAGNOSIS — M47.817 SPONDYLOSIS W/OUT MYELOPATHY OR RADICULOPATHY, LUMBOSACRAL REGION: ICD-10-CM

## 2023-04-07 DIAGNOSIS — K86.2 CYST OF PANCREAS: ICD-10-CM

## 2023-04-07 DIAGNOSIS — R10.12 LEFT UPPER QUADRANT PAIN: ICD-10-CM

## 2023-04-07 DIAGNOSIS — K59.04 CHRONIC IDIOPATHIC CONSTIPATION: ICD-10-CM

## 2023-04-07 DIAGNOSIS — R11.0 NAUSEA: ICD-10-CM

## 2023-04-07 DIAGNOSIS — R93.89 ABNORMAL FINDINGS ON DIAGNOSTIC IMAGING OF OTHER SPECIFIED BODY STRUCTURES: ICD-10-CM

## 2023-04-07 DIAGNOSIS — K29.30 CHRONIC SUPERFICIAL GASTRITIS W/OUT BLEEDING: ICD-10-CM

## 2023-04-07 DIAGNOSIS — K21.9 GASTRO-ESOPHAGEAL REFLUX DISEASE W/OUT ESOPHAGITIS: ICD-10-CM

## 2023-04-07 PROCEDURE — 99214 OFFICE O/P EST MOD 30 MIN: CPT

## 2023-04-07 PROCEDURE — 72100 X-RAY EXAM L-S SPINE 2/3 VWS: CPT

## 2023-04-07 PROCEDURE — 99203 OFFICE O/P NEW LOW 30 MIN: CPT

## 2023-04-07 RX ORDER — DICYCLOMINE HYDROCHLORIDE 10 MG/1
10 CAPSULE ORAL 3 TIMES DAILY
Qty: 90 | Refills: 3 | Status: ACTIVE | COMMUNITY
Start: 2023-04-07 | End: 1900-01-01

## 2023-04-07 RX ORDER — PREDNISONE 20 MG/1
20 TABLET ORAL
Qty: 8 | Refills: 0 | Status: ACTIVE | COMMUNITY
Start: 2023-02-16

## 2023-04-07 RX ORDER — SIMETHICONE 125 MG/1
125 TABLET, CHEWABLE ORAL
Qty: 120 | Refills: 3 | Status: ACTIVE | COMMUNITY
Start: 2023-04-07 | End: 1900-01-01

## 2023-04-07 RX ORDER — EPINEPHRINE 0.3 MG/.3ML
0.3 INJECTION INTRAMUSCULAR
Qty: 2 | Refills: 0 | Status: ACTIVE | COMMUNITY
Start: 2023-02-16

## 2023-04-07 RX ORDER — LINACLOTIDE 290 UG/1
290 CAPSULE, GELATIN COATED ORAL
Qty: 30 | Refills: 3 | Status: ACTIVE | COMMUNITY
Start: 2023-04-07 | End: 1900-01-01

## 2023-04-07 NOTE — HISTORY OF PRESENT ILLNESS
[de-identified] : Patient is a 47-year-old female chief complaint of back pain.  Pain is across her lumbosacral junction.  No specific radiation to her legs.  Symptoms are worse with prolonged standing.  She has used some over-the-counter medications.  No numbness, tingling, weakness, or bowel or bladder dysfunction. No difficulty with balance or fine motor skills. No fevers or chills. No constitutional symptoms or recent unintended weight loss.

## 2023-04-07 NOTE — HISTORY OF PRESENT ILLNESS
[de-identified] : \par \par \par  [de-identified] : CT angio of the chest with and without IV contrast performed on March 22, 2022 revealed no pulmonary embolism or other acute intrathoracic pathology, subcentimeter hypodensity in the pancreatic body possibly a pseudocyst versus intraductal papillary mucinous neoplasm and indeterminate sclerotic focus in the vertebral body of T7. \par \par The CAT scan of the abdomen pelvis with IV contrast performed on April 23, 2022 revealed an enlarged leiomyomatous uterus again identified, indeterminate liver lesion. \par  [FreeTextEntry1] : The MRI of the abdomen with and without IV contrast performed on April 27, 2022 revealed in the hepatic segment 2 there was a 1.9 cm brightly T2 hyperintense lesion with peripheral nodular enhancement with centripetal contrast filling compatible with a hemangioma and a few small cysts also seen in the left.  There was a 5 mm cystic lesion in the pancreatic body and it appears to communicate with the main pancreatic duct suggestive of a sidebranch IPMN.  \par

## 2023-04-07 NOTE — ASSESSMENT
[FreeTextEntry1] : Abdominal Pain: The patient complains of abdominal pain. The patient is to avoid nonsteroidal anti-inflammatory drugs and aspirin. I recommend a trial of Pantoprazole 40 mg once a day for 3 months for the symptoms.  \par Dyspepsia: The patient complains of dyspeptic symptoms.  The patient was advised to continue to abide by an anti-gas (low FOD-MAP) diet.  The patient was previously given a pamphlet for anti-gas (low FOD-MAP).  The patient and I reviewed the anti-gas (low FOD-MAP)diet at length again. The patient is to continue on a trial of Simethicone one tablet 4 times a day p.r.n. abdominal pain and gas.\par GERD: The patient was advised to avoid late-night meals and dietary indiscretions.  The patient was advised to avoid fried and fatty foods.  The patient was advised to abide by an anti-GERD diet. The patient was given a pamphlet for anti-GERD.  The patient and I reviewed the anti-GERD diet at length. I recommend a trial of Pantoprazole 40 mg once a day x 3 months for the symptoms.\par Nausea: The patient complains of nausea. If the symptoms of nausea persists, the patient may require a trial of Zofran 4 mg twice a day. \par Constipation: The patient complains of constipation. I recommend a high-fiber diet. I recommend a trial of a probiotic such as Align once a day. I recommend a trial of Metamucil once a day for fiber supplementation. I recommend a trial of Linzess 290 mcg once a day for the constipation. If the symptoms persist, the patient may require a colonoscopy to assess the symptoms.  The patient was told of the risks and benefits of the procedure.  The patient was told of the risks of perforation, emergency surgery, bleeding, infections and missed lesions.  The patient agreed and will followup to reassess the symptoms.  \par Weight Loss: The patient complains of fluctuating weight but denies any anorexia.  The patient previously complained of weight loss but denied any anorexia. The patient admitted to losing 15 pounds over 3 months. The patient attributed the weight loss to change in diet. \par Gastritis: The patient has a history of gastritis noted on prior upper endoscopy. The patient is to avoid nonsteroidal anti-inflammatory drugs and aspirin. I recommend a trial of pantoprazole 40 mg once a day for 3 months for the symptoms. \par Gastric Polyps:   The patient was found to have gastric polyps on prior upper endoscopy.  The pathology revealed fundic gland polyps.  The patient and I discussed the risks of fundic gland polyps.  The patient was told of the possibility of increasing size and bleeding secondary to gastric polyps.  The patient was advised to follow up in the office to reassess the gastric polyps.\par Internal Hemorrhoids: The patient is to consider starting a trial of Anusol H. C. suppositories one per rectum nightly and Anusol HC2 .5% cream apply to affected area twice a day p.r.n. hemorrhoidal bleeding or pain. I also recommend a trial of Calmoseptine cream apply to affected area twice a day for hemorrhoidal discomfort.  I recommend Tucks pads for the hemorrhoids.  I recommend Sitz baths twice a day for the hemorrhoids.  I recommend avoid wearing tight underwear and use boxers.  I recommend avoid touching the perineal area.   The patient agreed to followup. \par Poor Prep Colonoscopy: The patient had a recent poor prep colonoscopy. I recommend a repeat colonoscopy in < 1 year to reassess for colonic polyps secondary to the poor prep.  The patient was told of the risks and benefits of the procedure.  The patient was told of the risks of perforation, emergency surgery, bleeding, infections and missed lesions. The patient agreed and will return for the procedure.\par Prior Hospitalization: The patient was previously hospitalized at the [facility] [place]. The patient had blood work and imaging studies to assess the symptoms. I reviewed the hospital records, blood work and imaging studies performed in the hospital.\par Prior Endoscopic Procedures: The patient had a prior upper endoscopy and colonoscopy performed by another gastroenterologist, by Dr. Marco Antonio Valero. I reviewed the prior upper endoscopy and colonoscopy reports. \par Abnormal Imaging Study: The MRI of the abdomen with and without IV contrast performed on April 27, 2022 revealed in the hepatic segment 2 there was a 1.9 cm brightly T2 hyperintense lesion with peripheral nodular enhancement with centripetal contrast filling compatible with a hemangioma and a few small cysts also seen in the left. There was a 5 mm cystic lesion in the pancreatic body and it appears to communicate with the main pancreatic duct suggestive of a sidebranch IPMN. The CAT scan of the abdomen pelvis with IV contrast performed on April 23, 2022 revealed an enlarged leiomyomatous uterus again identified, indeterminate liver lesion. \par R/O IPMN: The MRI of the abdomen with and without IV contrast performed on April 27, 2022 revealed in the hepatic segment 2 there was a 1.9 cm brightly T2 hyperintense lesion with peripheral nodular enhancement with centripetal contrast filling compatible with a hemangioma and a few small cysts also seen in the left. There was a 5 mm cystic lesion in the pancreatic body and it appears to communicate with the main pancreatic duct suggestive of a sidebranch IPMN. The CAT scan of the abdomen pelvis with IV contrast performed on April 23, 2022 revealed an enlarged leiomyomatous uterus again identified, indeterminate liver lesion. The blood work performed on March 4, 2022 revealed a normal CEA level of 0.7 ng/mL and a normal CA 19-9 of 9 U/ml. The patient may require an endoscopic ultrasound to assess the possible IPMN.\par Blood Work: I recommend blood work to assess the patient's symptoms. I recommend a CBC, SMA 24, amylase, lipase, ESR, TFTs, MARK, rheumatoid factor, celiac sprue panel, IgA, profile for hepatitis A, B, C. ,iron, TIBC, ferritin level and lipid profile.  I also recommend obtaining the recent blood work performed by the patient's PMD.\par Imaging Study: I recommend an imaging study to assess the symptoms. I recommend a CAT scan of the abdomen and pelvis with and without IV contrast to assess the liver parenchyma and for liver lesions and to assess the pancreas for pancreatic cystic lesions.\par Asthma: The patient has a history of asthma.  The patient had an exacerbation of asthma that required emergency room evaluation.  The patient was evaluated at [facility] emergency room for the exacerbation of asthma.  The patient was treated with steroids and inhalers with improvement of the symptoms.  The patient was discharged in stable condition and advised to follow-up with her PMD regarding the asthma.  I recommend a chest X-ray PA and lateral to assess for pneumonia.\par Prior ER Evaluation:The patient was previously evaluated at the [facility] emergency room for exacerbation of the asthma.    I reviewed the emergency room.\par Follow-up: The patient is to follow-up in the office in 3 months to reassess the symptoms. The patient was told to call the office if any further problems. \par \par

## 2023-04-07 NOTE — DISCUSSION/SUMMARY
[de-identified] : We discussed further treatment options.  She will try course of physical therapy.  MRI if not improved or worsen.

## 2023-04-07 NOTE — PHYSICAL EXAM
[Antalgic] : not antalgic [de-identified] : Examination of the lumbar spine reveals no midline tenderness palpation, step-offs, or skin lesions. Decreased range of motion with respect to flexion, extension, lateral bending, and rotation. No tenderness to palpation of the sciatic notch. No tenderness palpation of the bilateral greater trochanters. No pain with passive internal/external rotation of the hips. No instability of bilateral lower extremities.  Negative KRISTOFER. Negative straight leg raise bilaterally. No bowstring. Negative femoral stretch. 5 out of 5 iliopsoas, hip abductors, hips adductors, quadriceps, hamstrings, gastrocsoleus, tibialis anterior, extensor hallucis longus, peroneals. Grossly intact sensation to light touch bilateral lower extremities. 1+ patellar and Achilles reflexes. Downgoing Babinski. No clonus. Intact proprioception. Palpable pulses. No skin lesion and no edema on the right and left lower extremities. [de-identified] : AP lateral lumbar x-rays is notable for significant spondylosis L5-S1.

## 2023-04-10 ENCOUNTER — NON-APPOINTMENT (OUTPATIENT)
Age: 48
End: 2023-04-10

## 2023-04-10 LAB
24R-OH-CALCIDIOL SERPL-MCNC: 66.5 PG/ML
AFP-TM SERPL-MCNC: 2.3 NG/ML
ALBUMIN SERPL ELPH-MCNC: 4.5 G/DL
ALP BLD-CCNC: 69 U/L
ALT SERPL-CCNC: 24 U/L
AMYLASE/CREAT SERPL: 63 U/L
AMYLASE/CREAT SERPL: 64 U/L
ANA SER IF-ACNC: NEGATIVE
ANION GAP SERPL CALC-SCNC: 13 MMOL/L
AST SERPL-CCNC: 26 U/L
BASOPHILS # BLD AUTO: 0.03 K/UL
BASOPHILS NFR BLD AUTO: 0.7 %
BILIRUB SERPL-MCNC: 0.4 MG/DL
BUN SERPL-MCNC: 15 MG/DL
CALCIUM SERPL-MCNC: 10.8 MG/DL
CANCER AG19-9 SERPL-ACNC: 8 U/ML
CEA SERPL-MCNC: 0.9 NG/ML
CHLORIDE SERPL-SCNC: 104 MMOL/L
CHOLEST SERPL-MCNC: 226 MG/DL
CO2 SERPL-SCNC: 24 MMOL/L
CREAT SERPL-MCNC: 0.61 MG/DL
EGFR: 111 ML/MIN/1.73M2
EOSINOPHIL # BLD AUTO: 0.16 K/UL
EOSINOPHIL NFR BLD AUTO: 3.8 %
ERYTHROCYTE [SEDIMENTATION RATE] IN BLOOD BY WESTERGREN METHOD: 20 MM/HR
FERRITIN SERPL-MCNC: 11 NG/ML
GGT SERPL-CCNC: 10 U/L
GLIADIN IGA SER QL: 9.8 UNITS
GLIADIN IGG SER QL: 5.6 UNITS
GLIADIN PEPTIDE IGA SER-ACNC: NEGATIVE
GLIADIN PEPTIDE IGG SER-ACNC: NEGATIVE
GLUCOSE SERPL-MCNC: 82 MG/DL
HBV CORE IGG+IGM SER QL: NONREACTIVE
HBV CORE IGM SER QL: NONREACTIVE
HBV E AB SER QL: NONREACTIVE
HBV E AG SER QL: NONREACTIVE
HBV SURFACE AB SER QL: NONREACTIVE
HBV SURFACE AG SER QL: NONREACTIVE
HCT VFR BLD CALC: 40.9 %
HCV AB SER QL: NONREACTIVE
HCV S/CO RATIO: 0.3 S/CO
HDLC SERPL-MCNC: 69 MG/DL
HEPATITIS A IGG ANTIBODY: NONREACTIVE
HGB BLD-MCNC: 12.3 G/DL
IGA SER QL IEP: 206 MG/DL
IMM GRANULOCYTES NFR BLD AUTO: 0.2 %
IRON SATN MFR SERPL: 11 %
IRON SERPL-MCNC: 48 UG/DL
LDLC SERPL CALC-MCNC: 148 MG/DL
LPL SERPL-CCNC: 34 U/L
LYMPHOCYTES # BLD AUTO: 1.35 K/UL
LYMPHOCYTES NFR BLD AUTO: 31.8 %
MAN DIFF?: NORMAL
MCHC RBC-ENTMCNC: 24.6 PG
MCHC RBC-ENTMCNC: 30.1 GM/DL
MCV RBC AUTO: 81.8 FL
MONOCYTES # BLD AUTO: 0.39 K/UL
MONOCYTES NFR BLD AUTO: 9.2 %
NEUTROPHILS # BLD AUTO: 2.3 K/UL
NEUTROPHILS NFR BLD AUTO: 54.3 %
NONHDLC SERPL-MCNC: 157 MG/DL
PLATELET # BLD AUTO: 204 K/UL
POTASSIUM SERPL-SCNC: 4.4 MMOL/L
PROT SERPL-MCNC: 6.9 G/DL
RBC # BLD: 5 M/UL
RBC # FLD: 20.6 %
SODIUM SERPL-SCNC: 140 MMOL/L
TIBC SERPL-MCNC: 439 UG/DL
TRIGL SERPL-MCNC: 45 MG/DL
TSH SERPL-ACNC: 1.54 UIU/ML
TTG IGA SER IA-ACNC: 1.2 U/ML
TTG IGA SER-ACNC: NEGATIVE
TTG IGG SER IA-ACNC: 4.5 U/ML
TTG IGG SER IA-ACNC: NEGATIVE
UIBC SERPL-MCNC: 391 UG/DL
WBC # FLD AUTO: 4.24 K/UL

## 2023-04-17 ENCOUNTER — TRANSCRIPTION ENCOUNTER (OUTPATIENT)
Age: 48
End: 2023-04-17

## 2023-04-25 ENCOUNTER — TRANSCRIPTION ENCOUNTER (OUTPATIENT)
Age: 48
End: 2023-04-25

## 2023-04-27 ENCOUNTER — APPOINTMENT (OUTPATIENT)
Dept: CT IMAGING | Facility: HOSPITAL | Age: 48
End: 2023-04-27

## 2023-05-04 ENCOUNTER — EMERGENCY (EMERGENCY)
Facility: HOSPITAL | Age: 48
LOS: 1 days | Discharge: HOME | End: 2023-05-04
Attending: EMERGENCY MEDICINE
Payer: MEDICAID

## 2023-05-04 VITALS
OXYGEN SATURATION: 100 % | WEIGHT: 166.89 LBS | DIASTOLIC BLOOD PRESSURE: 75 MMHG | HEART RATE: 65 BPM | SYSTOLIC BLOOD PRESSURE: 131 MMHG | HEIGHT: 69 IN | RESPIRATION RATE: 16 BRPM | TEMPERATURE: 99 F

## 2023-05-04 DIAGNOSIS — Z98.890 OTHER SPECIFIED POSTPROCEDURAL STATES: Chronic | ICD-10-CM

## 2023-05-04 LAB
ALBUMIN SERPL ELPH-MCNC: 3.7 G/DL — SIGNIFICANT CHANGE UP (ref 3.5–5)
ALP SERPL-CCNC: 63 U/L — SIGNIFICANT CHANGE UP (ref 40–120)
ALT FLD-CCNC: 34 U/L DA — SIGNIFICANT CHANGE UP (ref 10–60)
ANION GAP SERPL CALC-SCNC: 3 MMOL/L — LOW (ref 5–17)
APPEARANCE UR: CLEAR — SIGNIFICANT CHANGE UP
AST SERPL-CCNC: 26 U/L — SIGNIFICANT CHANGE UP (ref 10–40)
BACTERIA # UR AUTO: ABNORMAL /HPF
BASOPHILS # BLD AUTO: 0.06 K/UL — SIGNIFICANT CHANGE UP (ref 0–0.2)
BASOPHILS NFR BLD AUTO: 1.1 % — SIGNIFICANT CHANGE UP (ref 0–2)
BILIRUB SERPL-MCNC: 0.4 MG/DL — SIGNIFICANT CHANGE UP (ref 0.2–1.2)
BILIRUB UR-MCNC: NEGATIVE — SIGNIFICANT CHANGE UP
BUN SERPL-MCNC: 14 MG/DL — SIGNIFICANT CHANGE UP (ref 7–18)
CALCIUM SERPL-MCNC: 10 MG/DL — SIGNIFICANT CHANGE UP (ref 8.4–10.5)
CHLORIDE SERPL-SCNC: 107 MMOL/L — SIGNIFICANT CHANGE UP (ref 96–108)
CO2 SERPL-SCNC: 28 MMOL/L — SIGNIFICANT CHANGE UP (ref 22–31)
COLOR SPEC: YELLOW — SIGNIFICANT CHANGE UP
CREAT SERPL-MCNC: 0.64 MG/DL — SIGNIFICANT CHANGE UP (ref 0.5–1.3)
DIFF PNL FLD: ABNORMAL
EGFR: 110 ML/MIN/1.73M2 — SIGNIFICANT CHANGE UP
EOSINOPHIL # BLD AUTO: 0.09 K/UL — SIGNIFICANT CHANGE UP (ref 0–0.5)
EOSINOPHIL NFR BLD AUTO: 1.7 % — SIGNIFICANT CHANGE UP (ref 0–6)
EPI CELLS # UR: SIGNIFICANT CHANGE UP /HPF
GLUCOSE SERPL-MCNC: 96 MG/DL — SIGNIFICANT CHANGE UP (ref 70–99)
GLUCOSE UR QL: NEGATIVE — SIGNIFICANT CHANGE UP
HCG UR QL: NEGATIVE — SIGNIFICANT CHANGE UP
HCT VFR BLD CALC: 39.7 % — SIGNIFICANT CHANGE UP (ref 34.5–45)
HGB BLD-MCNC: 12.1 G/DL — SIGNIFICANT CHANGE UP (ref 11.5–15.5)
IMM GRANULOCYTES NFR BLD AUTO: 0.2 % — SIGNIFICANT CHANGE UP (ref 0–0.9)
KETONES UR-MCNC: NEGATIVE — SIGNIFICANT CHANGE UP
LEUKOCYTE ESTERASE UR-ACNC: NEGATIVE — SIGNIFICANT CHANGE UP
LYMPHOCYTES # BLD AUTO: 1.56 K/UL — SIGNIFICANT CHANGE UP (ref 1–3.3)
LYMPHOCYTES # BLD AUTO: 29.9 % — SIGNIFICANT CHANGE UP (ref 13–44)
MCHC RBC-ENTMCNC: 25.6 PG — LOW (ref 27–34)
MCHC RBC-ENTMCNC: 30.5 GM/DL — LOW (ref 32–36)
MCV RBC AUTO: 83.9 FL — SIGNIFICANT CHANGE UP (ref 80–100)
MONOCYTES # BLD AUTO: 0.56 K/UL — SIGNIFICANT CHANGE UP (ref 0–0.9)
MONOCYTES NFR BLD AUTO: 10.7 % — SIGNIFICANT CHANGE UP (ref 2–14)
NEUTROPHILS # BLD AUTO: 2.94 K/UL — SIGNIFICANT CHANGE UP (ref 1.8–7.4)
NEUTROPHILS NFR BLD AUTO: 56.4 % — SIGNIFICANT CHANGE UP (ref 43–77)
NITRITE UR-MCNC: NEGATIVE — SIGNIFICANT CHANGE UP
NRBC # BLD: 0 /100 WBCS — SIGNIFICANT CHANGE UP (ref 0–0)
PH UR: 6 — SIGNIFICANT CHANGE UP (ref 5–8)
PLATELET # BLD AUTO: 195 K/UL — SIGNIFICANT CHANGE UP (ref 150–400)
POTASSIUM SERPL-MCNC: 3.8 MMOL/L — SIGNIFICANT CHANGE UP (ref 3.5–5.3)
POTASSIUM SERPL-SCNC: 3.8 MMOL/L — SIGNIFICANT CHANGE UP (ref 3.5–5.3)
PROT SERPL-MCNC: 7.6 G/DL — SIGNIFICANT CHANGE UP (ref 6–8.3)
PROT UR-MCNC: NEGATIVE — SIGNIFICANT CHANGE UP
RBC # BLD: 4.73 M/UL — SIGNIFICANT CHANGE UP (ref 3.8–5.2)
RBC # FLD: 18.5 % — HIGH (ref 10.3–14.5)
RBC CASTS # UR COMP ASSIST: SIGNIFICANT CHANGE UP /HPF (ref 0–2)
SODIUM SERPL-SCNC: 138 MMOL/L — SIGNIFICANT CHANGE UP (ref 135–145)
SP GR SPEC: 1.02 — SIGNIFICANT CHANGE UP (ref 1.01–1.02)
UROBILINOGEN FLD QL: NEGATIVE — SIGNIFICANT CHANGE UP
WBC # BLD: 5.22 K/UL — SIGNIFICANT CHANGE UP (ref 3.8–10.5)
WBC # FLD AUTO: 5.22 K/UL — SIGNIFICANT CHANGE UP (ref 3.8–10.5)
WBC UR QL: SIGNIFICANT CHANGE UP /HPF (ref 0–5)

## 2023-05-04 PROCEDURE — 99284 EMERGENCY DEPT VISIT MOD MDM: CPT

## 2023-05-04 PROCEDURE — 96374 THER/PROPH/DIAG INJ IV PUSH: CPT

## 2023-05-04 PROCEDURE — 36415 COLL VENOUS BLD VENIPUNCTURE: CPT

## 2023-05-04 PROCEDURE — 99284 EMERGENCY DEPT VISIT MOD MDM: CPT | Mod: 52

## 2023-05-04 PROCEDURE — 81001 URINALYSIS AUTO W/SCOPE: CPT

## 2023-05-04 PROCEDURE — 80053 COMPREHEN METABOLIC PANEL: CPT

## 2023-05-04 PROCEDURE — 81025 URINE PREGNANCY TEST: CPT

## 2023-05-04 PROCEDURE — 87086 URINE CULTURE/COLONY COUNT: CPT

## 2023-05-04 PROCEDURE — 85025 COMPLETE CBC W/AUTO DIFF WBC: CPT

## 2023-05-04 RX ORDER — KETOROLAC TROMETHAMINE 30 MG/ML
30 SYRINGE (ML) INJECTION ONCE
Refills: 0 | Status: DISCONTINUED | OUTPATIENT
Start: 2023-05-04 | End: 2023-05-04

## 2023-05-04 RX ORDER — PHENAZOPYRIDINE HCL 100 MG
1 TABLET ORAL
Qty: 6 | Refills: 0
Start: 2023-05-04 | End: 2023-05-05

## 2023-05-04 RX ORDER — LIDOCAINE 4 G/100G
1 CREAM TOPICAL ONCE
Refills: 0 | Status: COMPLETED | OUTPATIENT
Start: 2023-05-04 | End: 2023-05-04

## 2023-05-04 RX ORDER — IBUPROFEN 200 MG
1 TABLET ORAL
Qty: 28 | Refills: 0
Start: 2023-05-04 | End: 2023-05-10

## 2023-05-04 RX ORDER — LIDOCAINE 4 G/100G
1 CREAM TOPICAL
Qty: 1 | Refills: 0
Start: 2023-05-04 | End: 2023-05-08

## 2023-05-04 RX ORDER — METHOCARBAMOL 500 MG/1
2 TABLET, FILM COATED ORAL
Qty: 18 | Refills: 0
Start: 2023-05-04 | End: 2023-05-06

## 2023-05-04 RX ADMIN — LIDOCAINE 1 PATCH: 4 CREAM TOPICAL at 12:57

## 2023-05-04 RX ADMIN — Medication 30 MILLIGRAM(S): at 13:32

## 2023-05-04 RX ADMIN — Medication 30 MILLIGRAM(S): at 12:54

## 2023-05-04 NOTE — ED PROVIDER NOTE - OBJECTIVE STATEMENT
47-year-old female with a history of asthma, right ankle surgery presents with left lower back pain, increased urinary frequency, burning with urination that began yesterday.  Patient denies flank pain, fevers, hematuria.

## 2023-05-04 NOTE — ED ADULT NURSE NOTE - NSFALLRSKASSESASSIST_ED_ALL_ED
From: Suzy Martinez  To: Bia Jeter no

## 2023-05-04 NOTE — ED PROVIDER NOTE - ATTENDING APP SHARED VISIT CONTRIBUTION OF CARE
seen with acp  c/o urgency and frequency on urination  also having back pain  urine shows nodigns of infection  patient will be given toradol  and pyridium  urine culture pending  will discharge  agree with acps assessment hx and physical and dispostion

## 2023-05-04 NOTE — ED PROVIDER NOTE - PROGRESS NOTE DETAILS
Urine negative for UTI or blood. Will await culture to treat. Will send symptomatic treatment to pharmacy at this time, will have patient follow up with PCP. Pt is well appearing walking with steady gait, stable for discharge and follow up without fail with medical doctor. I had a detailed discussion with the patient and/or guardian regarding the historical points, exam findings, and any diagnostic results supporting the discharge diagnosis. Pt educated on care and need for follow up. Strict return instructions and red flag signs and symptoms discussed with patient. Questions answered. Pt shows understanding of discharge information and agrees to follow.

## 2023-05-04 NOTE — ED PROVIDER NOTE - CLINICAL SUMMARY MEDICAL DECISION MAKING FREE TEXT BOX
47 year old female with left lower back pain and urinary symptoms. Will obtain labs and urine to r/o UTI vs pyelonephritis.

## 2023-05-04 NOTE — ED PROVIDER NOTE - CHPI ED SYMPTOMS NEG
no abdominal distension/no blood in stool/no diarrhea/no fever/no hematuria/no nausea/no vomiting/no chills

## 2023-05-04 NOTE — ED PROVIDER NOTE - PRINCIPAL DIAGNOSIS
Back pain High Dose Vitamin A Pregnancy And Lactation Text: High dose vitamin A therapy is contraindicated during pregnancy and breast feeding.

## 2023-05-04 NOTE — ED ADULT NURSE NOTE - OBJECTIVE STATEMENT
Pt presents to the ED with c/o pain in left lower back and frequency with urination since yesterday.

## 2023-05-04 NOTE — ED PROVIDER NOTE - PATIENT PORTAL LINK FT
You can access the FollowMyHealth Patient Portal offered by [facility] by registering at the following website: [email or web address]. By joining [employer]’s FollowMyHealth portal, you will also be able to view your health information using other applications (apps) compatible with our system.

## 2023-05-04 NOTE — ED PROVIDER NOTE - NSFOLLOWUPINSTRUCTIONS_ED_ALL_ED_FT
Follow up with your Primary Care Doctor within 7 days. If you do not have one, you can call the Internal Medicine office number below and make an appointment.    [facility]  Internal Medicine  [address] [place] [address]  Phone: [phone]  Fax: [phone]     Pain medications sent to the pharmacy for your lower back pain.     If you experience any new or worsening symptoms or if you are concerned you can always come back to the emergency for a re-evaluation.     Back Pain    Back pain can be scary. But even when the pain is severe, it usually goes away on its own within a few weeks. The cases that require urgent care or surgery are rare. Do not assume the worst. Almost everyone gets back pain at some point.     See your doctor or nurse if you have back pain and you:    -Recently had a fall or an injury to your back    -Have numbness or weakness in your legs    -Have problems with bladder or bowel control    -Have unexplained weight loss    -Have a fever or feel sick in other ways    -Take steroid medicine, such as prednisone, on a regular basis    -Have diabetes or a medical problem that weakens your immune system    -Have a history of cancer or osteoporosis    You should also see a doctor if:    -Your back pain is so severe that you cannot perform simple tasks    -Your back pain does not start to improve within 4 weeks    Many different things can cause low back pain. Most of the time, doctors do not know the exact cause.    Back pain can happen if you strain a muscle. This is often what has happened when a person "throws out" their back. This refers to pain that starts suddenly after physical activity, like lifting something heavy or bending the back.    Back pain can also happen if you have:    -Damaged, bulging, or torn discs    -Arthritis affecting the joints of the spine    -Bony growths on the vertebrae that crowd nearby nerves    -A vertebra out of place    -Narrowing in the spinal canal    -A tumor or infection (but this is very rare)    Most people with an episode of low back pain do not have a serious medical problem, and can try simple treatments such as:    -Staying active – The best thing you can do is to stay as active as possible. People with low back pain recover faster if they stay active. If your pain is severe, you might need to rest for a day or 2. But it's important to get back to walking and moving as soon as possible. While you should avoid heavy lifting and sports while your back hurts, try to keep doing your normal daily activities.    -Heat – Some people find that it helps to use a heating pad or heated wrap. Be careful to avoid high heat settings to prevent skin burns.    -Medicines – First, you can try pain medicines that you can get without a prescription. In many cases, doctors suggest first trying a nonsteroidal antiinflammatory drug, or "NSAID." NSAIDs include ibuprofen (sample brand names: Advil, Motrin) and naproxen (sample brand name: Aleve). These might work better than acetaminophen (Tylenol) for back pain.    -Treatments to help with symptoms – Some treatments might help you feel better for a little while. They include:    -Spinal manipulation – This is when a chiropractor, physical therapist, or other professional moves or "adjusts" the joints of your back. If you want to try this, talk to your doctor or nurse first.    -Acupuncture – This is when someone who knows traditional Chinese medicine inserts tiny needles into your body to block pain signals.    -Massage    While back pain usually goes away within a few weeks, some people do continue to have pain for longer. In this case, additional treatments might include:    -Self care – This involves being aware of your pain. While you should rest when you need to, it's important to stay active as much as you can. Things like applying heat and doing gentle stretches can help you feel better, too.    -Physical therapy – A physical therapist is an exercise expert who can teach you stretches and movements to help strengthen your muscles. The goal is to relieve pain but also help you get back to your normal activities. Exercises you can try include walking, swimming, or using an exercise bike. Some people also find that Patrice Chi or yoga can help with their back pain. Finding activities you enjoy can help you stay active.    -Reducing stress – Some people find that it helps to try something called "mindfulness-based stress reduction." This involves going to a group program to practice relaxation and meditation. If your back pain is making you feel anxious or depressed, talk to your doctor or nurse. There are other treatments that can help with these problems.

## 2023-05-06 LAB
CULTURE RESULTS: SIGNIFICANT CHANGE UP
SPECIMEN SOURCE: SIGNIFICANT CHANGE UP

## 2023-05-08 ENCOUNTER — TRANSCRIPTION ENCOUNTER (OUTPATIENT)
Age: 48
End: 2023-05-08

## 2023-05-11 DIAGNOSIS — M79.672 PAIN IN RIGHT FOOT: ICD-10-CM

## 2023-05-11 DIAGNOSIS — M79.671 PAIN IN RIGHT FOOT: ICD-10-CM

## 2023-05-15 RX ORDER — CEPHALEXIN 500 MG
1 CAPSULE ORAL
Qty: 14 | Refills: 0
Start: 2023-05-15 | End: 2023-05-21

## 2023-05-15 NOTE — ED POST DISCHARGE NOTE - DETAILS
Spoke with patient and sent rx to pharmacy. still having symptoms. return precautions provided as well.

## 2023-06-08 ENCOUNTER — APPOINTMENT (OUTPATIENT)
Dept: INTERNAL MEDICINE | Facility: CLINIC | Age: 48
End: 2023-06-08
Payer: MEDICAID

## 2023-06-08 VITALS
RESPIRATION RATE: 16 BRPM | BODY MASS INDEX: 25.18 KG/M2 | HEIGHT: 69 IN | WEIGHT: 170 LBS | HEART RATE: 62 BPM | DIASTOLIC BLOOD PRESSURE: 81 MMHG | OXYGEN SATURATION: 96 % | TEMPERATURE: 98 F | SYSTOLIC BLOOD PRESSURE: 142 MMHG

## 2023-06-08 DIAGNOSIS — M54.16 RADICULOPATHY, LUMBAR REGION: ICD-10-CM

## 2023-06-08 DIAGNOSIS — J45.909 UNSPECIFIED ASTHMA, UNCOMPLICATED: ICD-10-CM

## 2023-06-08 DIAGNOSIS — D50.9 IRON DEFICIENCY ANEMIA, UNSPECIFIED: ICD-10-CM

## 2023-06-08 DIAGNOSIS — R73.09 OTHER ABNORMAL GLUCOSE: ICD-10-CM

## 2023-06-08 PROCEDURE — 99213 OFFICE O/P EST LOW 20 MIN: CPT

## 2023-06-08 NOTE — HEALTH RISK ASSESSMENT
[No] : In the past 12 months have you used drugs other than those required for medical reasons? No [No falls in past year] : Patient reported no falls in the past year [Never] : Never [Intercurrent ED visits] : went to ED [de-identified] : 05/23 [de-identified] : Patient was recently evaluated by GI/ORTHO, findings and recommendations reviewed with the patient during today's examination.

## 2023-06-08 NOTE — HISTORY OF PRESENT ILLNESS
[de-identified] : 47 year old  female patient with history of stable Asthma, Iron Deficiency Anemia, Lumbar Radiculopathy, Migraine, history as stated, presented for follow up examination. Patient is compliant with all medications. ROS as stated.\par

## 2023-06-08 NOTE — ASSESSMENT
[FreeTextEntry1] : 47 year old female found to have stable Asthma, Iron Deficiency Anemia, Elevated Hemoglobin A1c, Lumbar Radiculopathy, Migraine,with the current prescription regimen as recommended, diet and life style modifications, as counseled. Prior results reviewed, interpreted and discussed with the patient during today's examination, as appropriate. Follow up, treatment plan and tests, as ordered.\par \par Total time spent : 25 minutes\par Including:\par Preparation prior to visit - Reviewing prior record, results of tests and Consultation Reports as applicable\par Conducting an appropriate H & P during today's encounter\par Appropriate orders for tests, medications and procedures, as applicable\par Counseling patient \par Note completion\par

## 2023-06-30 ENCOUNTER — APPOINTMENT (OUTPATIENT)
Dept: UROLOGY | Facility: CLINIC | Age: 48
End: 2023-06-30

## 2023-07-07 ENCOUNTER — APPOINTMENT (OUTPATIENT)
Dept: GASTROENTEROLOGY | Facility: CLINIC | Age: 48
End: 2023-07-07

## 2023-08-04 ENCOUNTER — APPOINTMENT (OUTPATIENT)
Dept: UROLOGY | Facility: CLINIC | Age: 48
End: 2023-08-04
Payer: MEDICAID

## 2023-08-04 VITALS
BODY MASS INDEX: 24.44 KG/M2 | HEIGHT: 69 IN | DIASTOLIC BLOOD PRESSURE: 82 MMHG | SYSTOLIC BLOOD PRESSURE: 128 MMHG | HEART RATE: 73 BPM | TEMPERATURE: 97.2 F | OXYGEN SATURATION: 99 % | WEIGHT: 165 LBS

## 2023-08-04 DIAGNOSIS — K30 FUNCTIONAL DYSPEPSIA: ICD-10-CM

## 2023-08-04 PROCEDURE — 99214 OFFICE O/P EST MOD 30 MIN: CPT

## 2023-08-04 RX ORDER — AMOXICILLIN AND CLAVULANATE POTASSIUM 875; 125 MG/1; MG/1
875-125 TABLET, COATED ORAL TWICE DAILY
Qty: 14 | Refills: 0 | Status: ACTIVE | COMMUNITY
Start: 2023-08-04 | End: 1900-01-01

## 2023-08-04 NOTE — ASSESSMENT
[FreeTextEntry1] : Ms. Cowan is a very pleasant 47 year old woman here today for dysuria and recurrent UTI. She was referred by PCP Dr. Kael Melendez. She reports that she had severe dysuria back in May and was diagnosed with a UTI. They sent her home with Pyridium and Methocarbanol but based on culture results it was switched to an antibiotic though she does not remember the name. Reports that since then she has still had the burning. Reports she has been following closely with her GYN for this and they have been giving her a couple of different medications for this but they have not helped. Based on medication record from [facility] she was given amoxicillin and cephalexin at different times. She reports she did not get her period last month she may be pregnant. Reports a slow to start stream. Denies hematuria. Denies any personal or family history of kidney stones. Denies any family history of urological cancers. Denies any history of smoking. UC. UA. Cytology. Urine pregnancy. GC/chlamydia. Ureaplasma/mycoplasma. HIV. Syphilis. Herpes I & II. RTO 3 weeks renal US. Start Augmentin at this time

## 2023-08-04 NOTE — HISTORY OF PRESENT ILLNESS
[Currently Experiencing ___] :  [unfilled] [Dysuria] : dysuria [None] : None [FreeTextEntry1] : Ms. Cowan is a very pleasant 47 year old woman here today for dysuria and recurrent UTI. She was referred by PCP Dr. Kael Melendez. She reports that she had severe dysuria back in May and was diagnosed with a UTI. They sent her home with Pyridium and Methocarbanol but based on culture results it was switched to an antibiotic though she does not remember the name. Reports that since then she has still had the burning. Reports she has been following closely with her GYN for this and they have been giving her a couple of different medications for this but they have not helped. Based on medication record from [facility] she was given amoxicillin and cephalexin at different times. She reports she did not get her period last month she may be pregnant. Reports a slow to start stream. Denies hematuria. Denies any personal or family history of kidney stones. Denies any family history of urological cancers. Denies any history of smoking.

## 2023-08-07 LAB
HIV1+2 AB SPEC QL IA.RAPID: NONREACTIVE
HSV 1+2 IGG SER IA-IMP: NEGATIVE
HSV 1+2 IGG SER IA-IMP: NEGATIVE
HSV1 IGG SER QL: 0.22 INDEX
HSV2 IGG SER QL: 0.22 INDEX
T PALLIDUM AB SER QL IA: NEGATIVE

## 2023-08-16 ENCOUNTER — NON-APPOINTMENT (OUTPATIENT)
Age: 48
End: 2023-08-16

## 2023-08-25 ENCOUNTER — APPOINTMENT (OUTPATIENT)
Dept: UROLOGY | Facility: CLINIC | Age: 48
End: 2023-08-25
Payer: MEDICAID

## 2023-08-25 DIAGNOSIS — R30.0 DYSURIA: ICD-10-CM

## 2023-08-25 PROCEDURE — 76775 US EXAM ABDO BACK WALL LIM: CPT

## 2023-08-25 PROCEDURE — 99214 OFFICE O/P EST MOD 30 MIN: CPT

## 2023-08-25 NOTE — ASSESSMENT
[FreeTextEntry1] : Very pleasant 47-year-old woman who presents for follow-up of dysuria, recurrent urinary tract infections -Ultrasound images reviewed with the patient demonstrating no kidney stones, hydronephrosis, renal masses -HIV negative -Herpes type I and II IgG negative -Syphilis negative -Urinalysis -Urine culture -Urine cytology -GC/chlamydia -Urine pregnancy screen -Follow-up in approximately 2 weeks for cystoscopy

## 2023-08-25 NOTE — HISTORY OF PRESENT ILLNESS
[FreeTextEntry1] : Very pleasant 47-year-old woman presents for follow-up of dysuria and recurrent urinary tract infections.  She reports that she continues to experience dysuria.  This has not improved despite treatment with multiple antibiotics in the past.  She underwent a renal ultrasound today which demonstrates no kidney stones, hydronephrosis, nor renal masses.  At last visit HIV, syphilis, herpes testing was negative.

## 2023-08-28 ENCOUNTER — NON-APPOINTMENT (OUTPATIENT)
Age: 48
End: 2023-08-28

## 2023-08-29 DIAGNOSIS — N39.0 URINARY TRACT INFECTION, SITE NOT SPECIFIED: ICD-10-CM

## 2023-08-29 LAB
APPEARANCE: CLEAR
BACTERIA: ABNORMAL /HPF
BILIRUBIN URINE: NEGATIVE
BLOOD URINE: NEGATIVE
C TRACH RRNA SPEC QL NAA+PROBE: NOT DETECTED
CAST: 0 /LPF
COLOR: YELLOW
EPITHELIAL CELLS: 5 /HPF
GLUCOSE QUALITATIVE U: NEGATIVE MG/DL
HCG UR QL: NEGATIVE
KETONES URINE: ABNORMAL MG/DL
LEUKOCYTE ESTERASE URINE: NEGATIVE
MICROSCOPIC-UA: NORMAL
N GONORRHOEA RRNA SPEC QL NAA+PROBE: NOT DETECTED
NITRITE URINE: NEGATIVE
PH URINE: 6
PROTEIN URINE: NEGATIVE MG/DL
RED BLOOD CELLS URINE: 3 /HPF
SOURCE AMPLIFICATION: NORMAL
SPECIFIC GRAVITY URINE: 1.03
URINE CYTOLOGY: NORMAL
UROBILINOGEN URINE: 0.2 MG/DL
WHITE BLOOD CELLS URINE: 1 /HPF

## 2023-08-29 RX ORDER — CIPROFLOXACIN HYDROCHLORIDE 500 MG/1
500 TABLET, FILM COATED ORAL TWICE DAILY
Qty: 14 | Refills: 0 | Status: ACTIVE | COMMUNITY
Start: 2023-08-29 | End: 1900-01-01

## 2023-08-31 ENCOUNTER — APPOINTMENT (OUTPATIENT)
Dept: INTERNAL MEDICINE | Facility: CLINIC | Age: 48
End: 2023-08-31

## 2023-08-31 ENCOUNTER — NON-APPOINTMENT (OUTPATIENT)
Age: 48
End: 2023-08-31

## 2023-09-08 ENCOUNTER — APPOINTMENT (OUTPATIENT)
Dept: UROLOGY | Facility: CLINIC | Age: 48
End: 2023-09-08

## 2023-09-25 ENCOUNTER — RX RENEWAL (OUTPATIENT)
Age: 48
End: 2023-09-25

## 2023-09-25 RX ORDER — FEXOFENADINE HYDROCHLORIDE 180 MG/1
180 TABLET ORAL DAILY
Qty: 30 | Refills: 0 | Status: ACTIVE | COMMUNITY
Start: 2023-03-03 | End: 1900-01-01

## 2023-10-05 NOTE — ED PROVIDER NOTE - CPE EDP NEURO NORM
Call to pt to relay provider message re: Ensure supplement. Relayed message. Pt verbalized understanding. No other questions or concern.          Saundra CHEN RN  [facility]     Cammy Bui MD Physician Signed  10/3/2023     Addend     Delete     Copy     Please let her know that apparently her insurance doesn't cover Ensure (or other types of supplements like this) if she doesn't have a feeding tube!         normal...

## 2023-10-12 ENCOUNTER — RX RENEWAL (OUTPATIENT)
Age: 48
End: 2023-10-12

## 2023-10-12 RX ORDER — PANTOPRAZOLE 40 MG/1
40 TABLET, DELAYED RELEASE ORAL DAILY
Qty: 30 | Refills: 2 | Status: ACTIVE | COMMUNITY
Start: 2023-04-07 | End: 1900-01-01

## 2023-10-27 ENCOUNTER — APPOINTMENT (OUTPATIENT)
Dept: UROLOGY | Facility: CLINIC | Age: 48
End: 2023-10-27

## 2024-01-17 NOTE — PHYSICAL EXAM
[TextEntry] : PULMONARY:  No respiratory distress and lungs were clear to auscultation bilaterally. HEART:  Heart rate was normal and rhythm regular, normal S1 and S2, no gallops/murmur/pericardial rub. VASCULAR:  No peripheral edema. ABDOMEN:  Normal bowel sounds, soft, non-tender, no hepatosplenomegaly and no abdominal mass palpated. NEUROLOGICAL: Normal gait and reflexes, no focal deficits.
no

## 2024-01-17 NOTE — HISTORY OF PRESENT ILLNESS
[de-identified] : 47 year old  female patient with history of stable Asthma, Iron Deficiency Anemia, Lumbar Radiculopathy, Migraine, history as stated, presented for follow up examination. Patient is compliant with all medications. ROS as stated.\par

## 2024-01-17 NOTE — HEALTH RISK ASSESSMENT
[No] : In the past 12 months have you used drugs other than those required for medical reasons? No [No falls in past year] : Patient reported no falls in the past year [Never] : Never [de-identified] : URO

## 2024-01-24 NOTE — ASSESSMENT
[FreeTextEntry1] : 48 year old female found to have stable Asthma, Iron Deficiency Anemia, Lumbar Radiculopathy, Migraine, with the current prescription regimen as recommended, diet and lifestyle modifications, as counseled. Prior results reviewed, interpreted and discussed with the patient during today's examination, as appropriate. Follow up, treatment plan and tests, as ordered.

## 2024-01-24 NOTE — HEALTH RISK ASSESSMENT
[Good] : ~his/her~  mood as  good [No] : In the past 12 months have you used drugs other than those required for medical reasons? No [No falls in past year] : Patient reported no falls in the past year [0] : 2) Feeling down, depressed, or hopeless: Not at all (0) [Patient reported mammogram was normal] : Patient reported mammogram was normal [Patient reported PAP Smear was normal] : Patient reported PAP Smear was normal [Patient reported colonoscopy was normal] : Patient reported colonoscopy was normal [None] : None [Feels Safe at Home] : Feels safe at home [Fully functional (bathing, dressing, toileting, transferring, walking, feeding)] : Fully functional (bathing, dressing, toileting, transferring, walking, feeding) [Fully functional (using the telephone, shopping, preparing meals, housekeeping, doing laundry, using] : Fully functional and needs no help or supervision to perform IADLs (using the telephone, shopping, preparing meals, housekeeping, doing laundry, using transportation, managing medications and managing finances) [Smoke Detector] : smoke detector [Carbon Monoxide Detector] : carbon monoxide detector [Seat Belt] :  uses seat belt [Sunscreen] : uses sunscreen [With Patient/Caregiver] : , with patient/caregiver [Never] : Never [FreeTextEntry1] : Check up  [de-identified] : URO [[id number]] : 0 [Change in mental status noted] : No change in mental status noted [Reports changes in hearing] : Reports no changes in hearing [Reports changes in vision] : Reports no changes in vision [Reports changes in dental health] : Reports no changes in dental health [MammogramDate] : 07/21 [PapSmearDate] : 06/22 [ColonoscopyDate] : 02/23 [HIVDate] : 08/23 [HIVComments] : Negative [HepatitisCDate] : 04/23 [HepatitisCComments] : Negative [AdvancecareDate] : 01/24

## 2024-01-24 NOTE — HISTORY OF PRESENT ILLNESS
[de-identified] : 48 year old female patient with history of stable Asthma, Iron Deficiency Anemia, Lumbar Radiculopathy, Migraine, history as stated, presented for an annual preventative examination.

## 2024-01-25 ENCOUNTER — APPOINTMENT (OUTPATIENT)
Dept: INTERNAL MEDICINE | Facility: CLINIC | Age: 49
End: 2024-01-25

## 2024-03-05 NOTE — ED ADULT TRIAGE NOTE - ARRIVAL FROM
Colpo w/Cx Biopsy and ECC    Pregnancy Results: negative from urine test     Consent signed.  Procedure discussed with patient in detail including indication, risk, benefits, alternatives and complications.    Procedure:  The patient was prepped and draped in the dorsal lithotomy position.  [place] speculum was placed in the vagina.  Under colposcopic examination the transition zone was not seen in entirety. The transition zone was regressed into the cervix. It was minimally able to be seen with manipulation.   Cervical biopsy performed with cervical biopsy punch at 11 o'clock.  Endocervix was curetted using a Kervorkian curette.  Monsel's solution was applied.  Specimen sent to pathology.  Patient tolerated procedure well.      Findings:  Normal appearing with regressed transition zone.     Impression:  Await pathology from ECC and Bx.   Colpo normal appearing with regressed transition zone.     Dr. Mayo Pandya MD    [facility]     This note was created by [employer] voice recognition. Errors in content may be related to improper recognition by the system; efforts to review and correct have been done but errors may still exist. Please be advised the primary purpose of this note is for me to communicate medical care. Standard sentence structure is not always used. Medical terminology and medical abbreviations may be used. There may be grammatical, typographical, and automated fill ins that may have errors missed in proofreading.     Work

## 2024-03-15 ENCOUNTER — APPOINTMENT (OUTPATIENT)
Dept: OBGYN | Facility: CLINIC | Age: 49
End: 2024-03-15

## 2024-03-15 VITALS
OXYGEN SATURATION: 97 % | HEART RATE: 88 BPM | HEIGHT: 69 IN | RESPIRATION RATE: 18 BRPM | BODY MASS INDEX: 26.66 KG/M2 | WEIGHT: 180 LBS | TEMPERATURE: 98.1 F | SYSTOLIC BLOOD PRESSURE: 119 MMHG | DIASTOLIC BLOOD PRESSURE: 79 MMHG

## 2024-03-20 ENCOUNTER — EMERGENCY (EMERGENCY)
Facility: HOSPITAL | Age: 49
LOS: 1 days | Discharge: HOME | End: 2024-03-20
Attending: EMERGENCY MEDICINE | Admitting: EMERGENCY MEDICINE
Payer: MEDICAID

## 2024-03-20 VITALS
SYSTOLIC BLOOD PRESSURE: 146 MMHG | DIASTOLIC BLOOD PRESSURE: 83 MMHG | OXYGEN SATURATION: 100 % | HEART RATE: 73 BPM | RESPIRATION RATE: 18 BRPM | TEMPERATURE: 98 F

## 2024-03-20 DIAGNOSIS — Z98.890 OTHER SPECIFIED POSTPROCEDURAL STATES: Chronic | ICD-10-CM

## 2024-03-20 LAB
ALBUMIN SERPL ELPH-MCNC: 4.3 G/DL — SIGNIFICANT CHANGE UP (ref 3.3–5)
ALP SERPL-CCNC: 54 U/L — SIGNIFICANT CHANGE UP (ref 40–120)
ALT FLD-CCNC: 23 U/L — SIGNIFICANT CHANGE UP (ref 4–33)
ANION GAP SERPL CALC-SCNC: 9 MMOL/L — SIGNIFICANT CHANGE UP (ref 7–14)
AST SERPL-CCNC: 22 U/L — SIGNIFICANT CHANGE UP (ref 4–32)
BASOPHILS # BLD AUTO: 0.05 K/UL — SIGNIFICANT CHANGE UP (ref 0–0.2)
BASOPHILS NFR BLD AUTO: 0.8 % — SIGNIFICANT CHANGE UP (ref 0–2)
BILIRUB SERPL-MCNC: 0.5 MG/DL — SIGNIFICANT CHANGE UP (ref 0.2–1.2)
BUN SERPL-MCNC: 16 MG/DL — SIGNIFICANT CHANGE UP (ref 7–23)
CALCIUM SERPL-MCNC: 10.6 MG/DL — HIGH (ref 8.4–10.5)
CHLORIDE SERPL-SCNC: 107 MMOL/L — SIGNIFICANT CHANGE UP (ref 98–107)
CO2 SERPL-SCNC: 24 MMOL/L — SIGNIFICANT CHANGE UP (ref 22–31)
CREAT SERPL-MCNC: 0.5 MG/DL — SIGNIFICANT CHANGE UP (ref 0.5–1.3)
EGFR: 116 ML/MIN/1.73M2 — SIGNIFICANT CHANGE UP
EOSINOPHIL # BLD AUTO: 0.14 K/UL — SIGNIFICANT CHANGE UP (ref 0–0.5)
EOSINOPHIL NFR BLD AUTO: 2.3 % — SIGNIFICANT CHANGE UP (ref 0–6)
GLUCOSE SERPL-MCNC: 83 MG/DL — SIGNIFICANT CHANGE UP (ref 70–99)
HCT VFR BLD CALC: 44.5 % — SIGNIFICANT CHANGE UP (ref 34.5–45)
HGB BLD-MCNC: 14.7 G/DL — SIGNIFICANT CHANGE UP (ref 11.5–15.5)
IANC: 4.01 K/UL — SIGNIFICANT CHANGE UP (ref 1.8–7.4)
IMM GRANULOCYTES NFR BLD AUTO: 0.2 % — SIGNIFICANT CHANGE UP (ref 0–0.9)
LYMPHOCYTES # BLD AUTO: 1.24 K/UL — SIGNIFICANT CHANGE UP (ref 1–3.3)
LYMPHOCYTES # BLD AUTO: 20.6 % — SIGNIFICANT CHANGE UP (ref 13–44)
MCHC RBC-ENTMCNC: 29.6 PG — SIGNIFICANT CHANGE UP (ref 27–34)
MCHC RBC-ENTMCNC: 33 GM/DL — SIGNIFICANT CHANGE UP (ref 32–36)
MCV RBC AUTO: 89.7 FL — SIGNIFICANT CHANGE UP (ref 80–100)
MONOCYTES # BLD AUTO: 0.56 K/UL — SIGNIFICANT CHANGE UP (ref 0–0.9)
MONOCYTES NFR BLD AUTO: 9.3 % — SIGNIFICANT CHANGE UP (ref 2–14)
NEUTROPHILS # BLD AUTO: 4.01 K/UL — SIGNIFICANT CHANGE UP (ref 1.8–7.4)
NEUTROPHILS NFR BLD AUTO: 66.8 % — SIGNIFICANT CHANGE UP (ref 43–77)
NRBC # BLD: 0 /100 WBCS — SIGNIFICANT CHANGE UP (ref 0–0)
NRBC # FLD: 0 K/UL — SIGNIFICANT CHANGE UP (ref 0–0)
PLATELET # BLD AUTO: 186 K/UL — SIGNIFICANT CHANGE UP (ref 150–400)
POTASSIUM SERPL-MCNC: 4 MMOL/L — SIGNIFICANT CHANGE UP (ref 3.5–5.3)
POTASSIUM SERPL-SCNC: 4 MMOL/L — SIGNIFICANT CHANGE UP (ref 3.5–5.3)
PROT SERPL-MCNC: 7.4 G/DL — SIGNIFICANT CHANGE UP (ref 6–8.3)
RBC # BLD: 4.96 M/UL — SIGNIFICANT CHANGE UP (ref 3.8–5.2)
RBC # FLD: 13.6 % — SIGNIFICANT CHANGE UP (ref 10.3–14.5)
SODIUM SERPL-SCNC: 140 MMOL/L — SIGNIFICANT CHANGE UP (ref 135–145)
WBC # BLD: 6.01 K/UL — SIGNIFICANT CHANGE UP (ref 3.8–10.5)
WBC # FLD AUTO: 6.01 K/UL — SIGNIFICANT CHANGE UP (ref 3.8–10.5)

## 2024-03-20 PROCEDURE — 70450 CT HEAD/BRAIN W/O DYE: CPT | Mod: 26,MC

## 2024-03-20 PROCEDURE — 93010 ELECTROCARDIOGRAM REPORT: CPT

## 2024-03-20 PROCEDURE — 99284 EMERGENCY DEPT VISIT MOD MDM: CPT

## 2024-03-20 RX ORDER — KETOROLAC TROMETHAMINE 30 MG/ML
30 SYRINGE (ML) INJECTION ONCE
Refills: 0 | Status: COMPLETED | OUTPATIENT
Start: 2024-03-20 | End: 2024-03-20

## 2024-03-20 RX ORDER — METOCLOPRAMIDE HCL 10 MG
10 TABLET ORAL ONCE
Refills: 0 | Status: COMPLETED | OUTPATIENT
Start: 2024-03-20 | End: 2024-03-20

## 2024-03-20 RX ORDER — DEXAMETHASONE 0.5 MG/5ML
6 ELIXIR ORAL ONCE
Refills: 0 | Status: DISCONTINUED | OUTPATIENT
Start: 2024-03-20 | End: 2024-03-20

## 2024-03-20 RX ORDER — SODIUM CHLORIDE 9 MG/ML
1000 INJECTION INTRAMUSCULAR; INTRAVENOUS; SUBCUTANEOUS ONCE
Refills: 0 | Status: COMPLETED | OUTPATIENT
Start: 2024-03-20 | End: 2024-03-20

## 2024-03-20 RX ADMIN — SODIUM CHLORIDE 1000 MILLILITER(S): 9 INJECTION INTRAMUSCULAR; INTRAVENOUS; SUBCUTANEOUS at 13:52

## 2024-03-20 RX ADMIN — Medication 10 MILLIGRAM(S): at 13:52

## 2024-03-20 NOTE — ED PROVIDER NOTE - PATIENT PORTAL LINK FT
You can access the FollowMyHealth Patient Portal offered by [facility] by registering at the following website: [email or web address]. By joining [facility]’s FollowMyHealth portal, you will also be able to view your health information using other applications (apps) compatible with our system.

## 2024-03-20 NOTE — ED PROVIDER NOTE - NSFOLLOWUPINSTRUCTIONS_ED_ALL_ED_FT
Follow up with your doctor in 2-3 days as scheduled.    Return to the ED for worse pain, fever, vomiting or other emergent concerns.

## 2024-03-20 NOTE — ED ADULT NURSE NOTE - OBJECTIVE STATEMENT
brk coverage: pt received spot intake 10c. pt A+Ox3 c/o tingling to bilateral cheeks, bilateral blurry vision, dizziness and headache starting around 7am. pt reports similar symptoms 2wks ago as well but had subsided. No other neuro deficits noted. IVSL in place. labs sent. NS infusing. medicated as ordered. pt to CT at this time.

## 2024-03-20 NOTE — ED PROVIDER NOTE - OBJECTIVE STATEMENT
48-year-old female history of migraines noncompliant with medications here with complaint of headache facial numbness and tingling to both cheeks, intermittent blurry vision.  Symptoms started this morning.  Feels different from headaches in the past.  Denies fevers chills neck stiffness.

## 2024-03-20 NOTE — ED PROVIDER NOTE - CLINICAL SUMMARY MEDICAL DECISION MAKING FREE TEXT BOX
48-year-old female history of migraines here with complaint of headache paresthesias intermittent blurry vision.  Likely migraine, however, given the presentation will obtain head imaging.  Obtain CT head CBC CMP hCG give IV fluid bolus Reglan Toradol, likely discharge with neuro follow-up as outpatient.

## 2024-03-20 NOTE — ED ADULT NURSE NOTE - NS_SISCREENINGSR_GEN_ALL_ED
Hpi Title: Evaluation of Skin Lesions How Severe Are Your Spot(S)?: mild Have Your Spot(S) Been Treated In The Past?: has not been treated Negative

## 2024-03-20 NOTE — ED ADULT NURSE NOTE - CHIEF COMPLAINT QUOTE
Pt. c/o tingling to bilateral cheeks, bilateral blurry vision, dizziness and headache starting around 7am. States she felt it 2wks ago as well but had subsided. Denies any congestion or itching of eyes. No facial droop or slurred speech. No other neuro deficits noted. No pmhx

## 2024-03-20 NOTE — ED PROVIDER NOTE - PROGRESS NOTE DETAILS
s/p left foot pito bunionectomy
Pt signed out to me.  headache improved markedly.  wants to go home.  offered decadron to prevent rebound headache but pt declined.

## 2024-04-08 ENCOUNTER — NON-APPOINTMENT (OUTPATIENT)
Age: 49
End: 2024-04-08

## 2024-06-25 ENCOUNTER — APPOINTMENT (OUTPATIENT)
Dept: INTERNAL MEDICINE | Facility: CLINIC | Age: 49
End: 2024-06-25

## 2024-06-26 ENCOUNTER — APPOINTMENT (OUTPATIENT)
Dept: OBGYN | Facility: CLINIC | Age: 49
End: 2024-06-26

## 2024-07-11 ENCOUNTER — APPOINTMENT (OUTPATIENT)
Dept: INTERNAL MEDICINE | Facility: CLINIC | Age: 49
End: 2024-07-11

## 2024-07-16 NOTE — HISTORY OF PRESENT ILLNESS
[Patient reported mammogram was abnormal] : Patient reported mammogram was abnormal [N] : Patient is not sexually active [Y] : Positive pregnancy history [FreeTextEntry1] : Patient complains of right breast pain that started 3 weeks ago.  [TextBox_4] : Pt is here with c/o pain and burning in right breast x 3 wks. Has a history of abnormal breast findings/biopsies. Last mammogram 5/2019. Hx breast biopsies with diagnosis of fibroadenoma\par Pt was seen in ER 12/29 for HA/ataxia. MRI /CT scan was done, on CT scan 7 mm nodule was noted on CT scan in post left lobe. Pt has neurology apt today . MRI done, no infarct noted\par Pt is concerned about MS , has maternal aunt and maternal cousin / paternal cousin with MS\par Pt has history of multiple fibroids, anemia, was advised to have an EMB/referral for UFE was also given, pt does not want an EMB today and instead wants to prioritize her health issues\par Pt is also due a pap and desires to come back for pap due to menses\par  [Mammogramdate] : 2019 [TextBox_19] : biopsy done on both breast; benign as per patient [LMPDate] : 12/23/20 [PGHxTotal] : 5 [[facility]] : 2 [PGHxPremature] : 0 [PGHxAbortions] : 1 [[facility]Living] : 2 [PGHxABInduced] : 0 [PGHxABSpont] : 2 [PGHxEctopic] : 0 [PGHxMultBirths] : 0 [No] : Patient does not have concerns regarding sex 6

## 2024-08-06 ENCOUNTER — APPOINTMENT (OUTPATIENT)
Dept: OBGYN | Facility: CLINIC | Age: 49
End: 2024-08-06

## 2024-08-06 ENCOUNTER — TRANSCRIPTION ENCOUNTER (OUTPATIENT)
Age: 49
End: 2024-08-06

## 2024-08-13 ENCOUNTER — APPOINTMENT (OUTPATIENT)
Dept: INTERNAL MEDICINE | Facility: CLINIC | Age: 49
End: 2024-08-13

## 2024-09-15 NOTE — ASSESSMENT
[FreeTextEntry1] : 49 year old female found to have stable Asthma, Iron Deficiency Anemia, Lumbar Radiculopathy, Migraine, with the current prescription regimen as recommended, diet and lifestyle modifications, as counseled. Prior results reviewed, interpreted and discussed with the patient during today's examination, as appropriate. Follow up, treatment plan and tests, as ordered.

## 2024-09-15 NOTE — HEALTH RISK ASSESSMENT
[FreeTextEntry1] : Check up  [de-identified] : None [ICK2Xcdmw] : 0 [Change in mental status noted] : No change in mental status noted [Reports changes in hearing] : Reports no changes in hearing [Reports changes in vision] : Reports no changes in vision [Reports changes in dental health] : Reports no changes in dental health [PapSmearDate] : 06/22 [MammogramDate] : 07/21 [ColonoscopyDate] : 02/23 [HIVDate] : 08/23 [HIVComments] : Negative [HepatitisCDate] : 04/23 [HepatitisCComments] : Negative [AdvancecareDate] : 09/24

## 2024-09-15 NOTE — HEALTH RISK ASSESSMENT
[FreeTextEntry1] : Check up  [de-identified] : None [MSW6Glwfr] : 0 [Change in mental status noted] : No change in mental status noted [Reports changes in hearing] : Reports no changes in hearing [Reports changes in vision] : Reports no changes in vision [Reports changes in dental health] : Reports no changes in dental health [PapSmearDate] : 06/22 [MammogramDate] : 07/21 [ColonoscopyDate] : 02/23 [HIVComments] : Negative [HIVDate] : 08/23 [HepatitisCDate] : 04/23 [HepatitisCComments] : Negative [AdvancecareDate] : 09/24

## 2024-09-15 NOTE — HISTORY OF PRESENT ILLNESS
[de-identified] : 49 year old female patient with history of stable Asthma, Iron Deficiency Anemia, Lumbar Radiculopathy, Migraine, history as stated, presented for an annual preventative examination.

## 2024-09-15 NOTE — HISTORY OF PRESENT ILLNESS
[de-identified] : 49 year old female patient with history of stable Asthma, Iron Deficiency Anemia, Lumbar Radiculopathy, Migraine, history as stated, presented for an annual preventative examination.

## 2024-09-17 ENCOUNTER — APPOINTMENT (OUTPATIENT)
Dept: INTERNAL MEDICINE | Facility: CLINIC | Age: 49
End: 2024-09-17

## 2024-09-23 ENCOUNTER — APPOINTMENT (OUTPATIENT)
Dept: INTERNAL MEDICINE | Facility: CLINIC | Age: 49
End: 2024-09-23
Payer: MEDICAID

## 2024-09-23 VITALS
DIASTOLIC BLOOD PRESSURE: 84 MMHG | HEIGHT: 69 IN | HEART RATE: 77 BPM | RESPIRATION RATE: 16 BRPM | OXYGEN SATURATION: 98 % | WEIGHT: 182 LBS | SYSTOLIC BLOOD PRESSURE: 128 MMHG | TEMPERATURE: 98.2 F | BODY MASS INDEX: 26.96 KG/M2

## 2024-09-23 DIAGNOSIS — M79.89 OTHER SPECIFIED SOFT TISSUE DISORDERS: ICD-10-CM

## 2024-09-23 DIAGNOSIS — Z00.00 ENCOUNTER FOR GENERAL ADULT MEDICAL EXAMINATION W/OUT ABNORMAL FINDINGS: ICD-10-CM

## 2024-09-23 DIAGNOSIS — M25.512 PAIN IN LEFT SHOULDER: ICD-10-CM

## 2024-09-23 PROCEDURE — 99396 PREV VISIT EST AGE 40-64: CPT

## 2024-09-23 PROCEDURE — 36415 COLL VENOUS BLD VENIPUNCTURE: CPT

## 2024-09-23 NOTE — HEALTH RISK ASSESSMENT
[FreeTextEntry1] : Check up  [de-identified] : None [[id number]] : 0 [Change in mental status noted] : No change in mental status noted [Reports changes in hearing] : Reports no changes in hearing [Reports changes in vision] : Reports no changes in vision [Reports changes in dental health] : Reports no changes in dental health [MammogramDate] : 07/21 [PapSmearDate] : 06/22 [ColonoscopyDate] : 02/23 [HIVDate] : 08/23 [HIVComments] : Negative [HepatitisCDate] : 04/23 [HepatitisCComments] : Negative [AdvancecareDate] : 09/24

## 2024-09-23 NOTE — PHYSICAL EXAM
[TextEntry] : CONSTITUTIONAL:  No acute distress, well developed and well appearing. EYES:  Normal sclera/conjunctiva, PERRLA, EOMI. ENT:  Normal outer ears/nose, normal oropharynx. NECK:  No JVD, supple, thyroid normal/no nodules, no lymphadenopathy. PULMONARY:  No respiratory distress, clear to auscultation, no accessory muscle use. CARDIAC:  Normal rate, regular rhythm, normal S1/S2, no murmur. VASCULAR:  No carotid bruits, no abdominal bruit, no varicosities, pedal pulses present, no edema, no extremity clubbing/cyanosis. ABDOMEN:  Normoactive bowel sounds, soft and nontender, no hepatosplenomegaly or masses appreciated. BACK:  No CVA tenderness, no spinal tenderness. MUSCULOSKELETAL:  No joint swelling, grossly normal strength/tone, left shoulder joint with reduced range of motion due to pain, ORTHO F/U, right thigh noted to have small globular soft mobile mass with some tenderness, consistent with likely lipoma, general surgery follow-up, as counseled. SKIN:  No rash, normal turgor. NEUROLOGY:  Normal gait and coordination, no focal deficits.

## 2024-09-23 NOTE — HISTORY OF PRESENT ILLNESS
[de-identified] : 49 year old female patient with history of stable Asthma, Iron Deficiency Anemia, Lumbar Radiculopathy, Migraine, history as stated, presented for an annual preventative examination.

## 2024-09-23 NOTE — ASSESSMENT
[FreeTextEntry1] : 49 year old female found to have stable Asthma, Iron Deficiency Anemia, Lumbar Radiculopathy, Migraine, with the current prescription regimen as recommended, diet and lifestyle modifications, as counseled. Prior results reviewed, interpreted and discussed with the patient during today's examination, as appropriate. Follow up, treatment plan and tests, as ordered.   Patient was recommended to follow up with GYN for routine examination, PAP smear and Mammogram, reports will be provided, when ready.

## 2024-09-23 NOTE — HEALTH RISK ASSESSMENT
[FreeTextEntry1] : Check up  [de-identified] : None [[id number]] : 0 [Change in mental status noted] : No change in mental status noted [Reports changes in hearing] : Reports no changes in hearing [Reports changes in vision] : Reports no changes in vision [Reports changes in dental health] : Reports no changes in dental health [MammogramDate] : 07/21 [PapSmearDate] : 06/22 [ColonoscopyDate] : 02/23 [HIVDate] : 08/23 [HIVComments] : Negative [HepatitisCDate] : 04/23 [HepatitisCComments] : Negative [AdvancecareDate] : 09/24

## 2024-09-23 NOTE — HISTORY OF PRESENT ILLNESS
[de-identified] : 49 year old female patient with history of stable Asthma, Iron Deficiency Anemia, Lumbar Radiculopathy, Migraine, history as stated, presented for an annual preventative examination.

## 2024-09-26 LAB
25(OH)D3 SERPL-MCNC: 17.7 NG/ML
ALBUMIN SERPL ELPH-MCNC: 4.6 G/DL
ALP BLD-CCNC: 67 U/L
ALT SERPL-CCNC: 19 U/L
ANION GAP SERPL CALC-SCNC: 12 MMOL/L
AST SERPL-CCNC: 20 U/L
BASOPHILS # BLD AUTO: 0.05 K/UL
BASOPHILS NFR BLD AUTO: 0.9 %
BILIRUB SERPL-MCNC: 0.4 MG/DL
BUN SERPL-MCNC: 13 MG/DL
CALCIUM SERPL-MCNC: 11 MG/DL
CALCIUM SERPL-MCNC: 11 MG/DL
CHLORIDE SERPL-SCNC: 102 MMOL/L
CHOLEST SERPL-MCNC: 218 MG/DL
CO2 SERPL-SCNC: 27 MMOL/L
CREAT SERPL-MCNC: 0.67 MG/DL
EGFR: 107 ML/MIN/1.73M2
EOSINOPHIL # BLD AUTO: 0.14 K/UL
EOSINOPHIL NFR BLD AUTO: 2.6 %
ESTIMATED AVERAGE GLUCOSE: 105 MG/DL
GGT SERPL-CCNC: 13 U/L
GLUCOSE SERPL-MCNC: 86 MG/DL
HBA1C MFR BLD HPLC: 5.3 %
HCT VFR BLD CALC: 49.7 %
HDLC SERPL-MCNC: 64 MG/DL
HGB BLD-MCNC: 15.3 G/DL
IMM GRANULOCYTES NFR BLD AUTO: 0.2 %
LDLC SERPL CALC-MCNC: 135 MG/DL
LYMPHOCYTES # BLD AUTO: 1.82 K/UL
LYMPHOCYTES NFR BLD AUTO: 33.8 %
MAN DIFF?: NORMAL
MCHC RBC-ENTMCNC: 29.4 PG
MCHC RBC-ENTMCNC: 30.8 GM/DL
MCV RBC AUTO: 95.4 FL
MONOCYTES # BLD AUTO: 0.55 K/UL
MONOCYTES NFR BLD AUTO: 10.2 %
NEUTROPHILS # BLD AUTO: 2.82 K/UL
NEUTROPHILS NFR BLD AUTO: 52.3 %
NONHDLC SERPL-MCNC: 154 MG/DL
PARATHYROID HORMONE INTACT: 64 PG/ML
PLATELET # BLD AUTO: 193 K/UL
POTASSIUM SERPL-SCNC: 4.4 MMOL/L
PROT SERPL-MCNC: 7.2 G/DL
RBC # BLD: 5.21 M/UL
RBC # FLD: 13.9 %
SODIUM SERPL-SCNC: 140 MMOL/L
TRIGL SERPL-MCNC: 109 MG/DL
TSH SERPL-ACNC: 1.52 UIU/ML
WBC # FLD AUTO: 5.39 K/UL

## 2024-09-30 ENCOUNTER — APPOINTMENT (OUTPATIENT)
Dept: SURGERY | Facility: CLINIC | Age: 49
End: 2024-09-30
Payer: MEDICAID

## 2024-09-30 VITALS
SYSTOLIC BLOOD PRESSURE: 139 MMHG | WEIGHT: 182 LBS | BODY MASS INDEX: 26.96 KG/M2 | HEART RATE: 79 BPM | HEIGHT: 69 IN | DIASTOLIC BLOOD PRESSURE: 87 MMHG

## 2024-09-30 DIAGNOSIS — R22.41 LOCALIZED SWELLING, MASS AND LUMP, RIGHT LOWER LIMB: ICD-10-CM

## 2024-09-30 PROCEDURE — 99203 OFFICE O/P NEW LOW 30 MIN: CPT

## 2024-09-30 NOTE — PHYSICAL EXAM
[Alert] : alert [Oriented to Person] : oriented to person [Oriented to Place] : oriented to place [Oriented to Time] : oriented to time [Calm] : calm [de-identified] : A/Ox3; NAD. appears comfortable [de-identified] : EOMI; sclera anicteric. [de-identified] : airway patent, no use of accessory muscles [de-identified] : abd is soft, NT/ND [de-identified] : palpable mass, w local tenderness to right anterior thigh (2.5 cm) and right medial middle R thigh; approx 3.5 cm

## 2024-09-30 NOTE — HISTORY OF PRESENT ILLNESS
[de-identified] : Ms. HILLS is a 49 year y/o F w PMH Fe Def Anemia, Asthma, referred by PCP, Dr. Melendez- for evaluation of right thigh swelling vs mass.  Pt has a lot of pain to right upper thigh to anterior and posterior aspect. Has small palpable tender masses, which she's noticed over the past few months.

## 2024-09-30 NOTE — HISTORY OF PRESENT ILLNESS
[de-identified] : Ms. HILLS is a 49 year y/o F w PMH Fe Def Anemia, Asthma, referred by PCP, Dr. Melendez- for evaluation of right thigh swelling vs mass.  Pt has a lot of pain to right upper thigh to anterior and posterior aspect. Has small palpable tender masses, which she's noticed over the past few months.

## 2024-09-30 NOTE — ASSESSMENT
[FreeTextEntry1] : IMP: 48 yo F w pain to right upper thigh--  palpable masses: R anterior thigh (2.5 x 2 cm in size), and medial middle upper thigh (3.5 cm). has local tenderness to both areas and pain to anterior and posterior R upper thigh.  Pt was informed that her symptoms are out of proportion to PE findings and pain may not completely resolve after the procedure.

## 2024-09-30 NOTE — PLAN
[FreeTextEntry1] : Ms. RUBY HILLS  was informed of significance of findings. All options, risks and benefits were discussed at length. Informed consent for excision of, right anterior thigh masses x2 - right anterior thigh and right medial mid thigh--and potential risks, benefits and alternatives (surgical options were discussed including non-surgical options or the option of no surgery) to the planned surgery were discussed in depth. All surgical options were discussed including non-surgical treatments. The patient wishes to proceed with surgery. We will plan for surgery on at the next available date, pending any required insurance pre-certification or pre-approval. He agrees to obtain any necessary pre-operative evaluations and testing prior to surgery. Patient advised to seek immediate medical attention with any acute change in symptoms or with the development of any new or worsening symptoms. Patient's questions and concerns addressed to patient's satisfaction, and patient verbalized an understanding of the information discussed.

## 2024-09-30 NOTE — PHYSICAL EXAM
[Alert] : alert [Oriented to Person] : oriented to person [Oriented to Place] : oriented to place [Oriented to Time] : oriented to time [Calm] : calm [de-identified] : A/Ox3; NAD. appears comfortable [de-identified] : EOMI; sclera anicteric. [de-identified] : airway patent, no use of accessory muscles [de-identified] : abd is soft, NT/ND [de-identified] : palpable mass, w local tenderness to right anterior thigh (2.5 cm) and right medial middle R thigh; approx 3.5 cm

## 2024-09-30 NOTE — CONSULT LETTER
[Dear  ___] : Dear  [unfilled], [Consult Letter:] : I had the pleasure of evaluating your patient, [unfilled]. [Consult Closing:] : Thank you very much for allowing me to participate in the care of this patient.  If you have any questions, please do not hesitate to contact me. [Sincerely,] : Sincerely, [FreeTextEntry3] : Werner Oquendo MD General Surgery

## 2024-10-02 ENCOUNTER — TRANSCRIPTION ENCOUNTER (OUTPATIENT)
Age: 49
End: 2024-10-02

## 2024-10-03 ENCOUNTER — TRANSCRIPTION ENCOUNTER (OUTPATIENT)
Age: 49
End: 2024-10-03

## 2024-10-07 ENCOUNTER — TRANSCRIPTION ENCOUNTER (OUTPATIENT)
Age: 49
End: 2024-10-07

## 2024-10-14 ENCOUNTER — APPOINTMENT (OUTPATIENT)
Dept: OBGYN | Facility: CLINIC | Age: 49
End: 2024-10-14

## 2024-10-14 PROBLEM — Z01.818 PREOPERATIVE CLEARANCE: Status: ACTIVE | Noted: 2024-10-14

## 2024-10-15 ENCOUNTER — APPOINTMENT (OUTPATIENT)
Dept: INTERNAL MEDICINE | Facility: CLINIC | Age: 49
End: 2024-10-15

## 2024-10-15 ENCOUNTER — APPOINTMENT (OUTPATIENT)
Age: 49
End: 2024-10-15

## 2024-10-22 ENCOUNTER — TRANSCRIPTION ENCOUNTER (OUTPATIENT)
Age: 49
End: 2024-10-22

## 2024-10-24 ENCOUNTER — NON-APPOINTMENT (OUTPATIENT)
Age: 49
End: 2024-10-24

## 2024-10-24 ENCOUNTER — APPOINTMENT (OUTPATIENT)
Dept: INTERNAL MEDICINE | Facility: CLINIC | Age: 49
End: 2024-10-24
Payer: MEDICAID

## 2024-10-24 VITALS
WEIGHT: 186 LBS | OXYGEN SATURATION: 98 % | DIASTOLIC BLOOD PRESSURE: 75 MMHG | HEART RATE: 72 BPM | BODY MASS INDEX: 27.55 KG/M2 | SYSTOLIC BLOOD PRESSURE: 113 MMHG | RESPIRATION RATE: 15 BRPM | TEMPERATURE: 97.7 F | HEIGHT: 69 IN

## 2024-10-24 DIAGNOSIS — R22.41 LOCALIZED SWELLING, MASS AND LUMP, RIGHT LOWER LIMB: ICD-10-CM

## 2024-10-24 DIAGNOSIS — M25.512 PAIN IN LEFT SHOULDER: ICD-10-CM

## 2024-10-24 DIAGNOSIS — G43.909 MIGRAINE, UNSPECIFIED, NOT INTRACTABLE, W/OUT STATUS MIGRAINOSUS: ICD-10-CM

## 2024-10-24 DIAGNOSIS — J45.909 UNSPECIFIED ASTHMA, UNCOMPLICATED: ICD-10-CM

## 2024-10-24 DIAGNOSIS — M54.16 RADICULOPATHY, LUMBAR REGION: ICD-10-CM

## 2024-10-24 DIAGNOSIS — Z01.818 ENCOUNTER FOR OTHER PREPROCEDURAL EXAMINATION: ICD-10-CM

## 2024-10-24 PROCEDURE — 99213 OFFICE O/P EST LOW 20 MIN: CPT

## 2024-10-24 PROCEDURE — 93000 ELECTROCARDIOGRAM COMPLETE: CPT

## 2024-10-24 PROCEDURE — 36415 COLL VENOUS BLD VENIPUNCTURE: CPT

## 2024-10-24 RX ORDER — MULTIVIT-MIN/IRON/FOLIC ACID/K 18-600-40
400 CAPSULE ORAL
Qty: 90 | Refills: 3 | Status: ACTIVE | COMMUNITY

## 2024-10-25 LAB
ABO + RH PNL BLD: NORMAL
ALBUMIN SERPL ELPH-MCNC: 4.5 G/DL
ALP BLD-CCNC: 66 U/L
ALT SERPL-CCNC: 18 U/L
ANION GAP SERPL CALC-SCNC: 14 MMOL/L
APTT BLD: 29.4 SEC
AST SERPL-CCNC: 20 U/L
BASOPHILS # BLD AUTO: 0.04 K/UL
BASOPHILS NFR BLD AUTO: 0.7 %
BILIRUB SERPL-MCNC: 0.3 MG/DL
BUN SERPL-MCNC: 18 MG/DL
CALCIUM SERPL-MCNC: 10.8 MG/DL
CHLORIDE SERPL-SCNC: 104 MMOL/L
CO2 SERPL-SCNC: 23 MMOL/L
CREAT SERPL-MCNC: 0.76 MG/DL
EGFR: 96 ML/MIN/1.73M2
EOSINOPHIL # BLD AUTO: 0.08 K/UL
EOSINOPHIL NFR BLD AUTO: 1.4 %
GLUCOSE SERPL-MCNC: 95 MG/DL
HCG SERPL-MCNC: <1 MIU/ML
HCT VFR BLD CALC: 44.5 %
HCV AB SER QL: NONREACTIVE
HCV S/CO RATIO: 0.33 S/CO
HGB BLD-MCNC: 14.2 G/DL
IMM GRANULOCYTES NFR BLD AUTO: 0.2 %
INR PPP: 0.93 RATIO
LYMPHOCYTES # BLD AUTO: 1.6 K/UL
LYMPHOCYTES NFR BLD AUTO: 28.5 %
MAN DIFF?: NORMAL
MCHC RBC-ENTMCNC: 29.9 PG
MCHC RBC-ENTMCNC: 31.9 GM/DL
MCV RBC AUTO: 93.7 FL
MONOCYTES # BLD AUTO: 0.65 K/UL
MONOCYTES NFR BLD AUTO: 11.6 %
NEUTROPHILS # BLD AUTO: 3.24 K/UL
NEUTROPHILS NFR BLD AUTO: 57.6 %
PLATELET # BLD AUTO: 185 K/UL
POTASSIUM SERPL-SCNC: 4.1 MMOL/L
PROT SERPL-MCNC: 7.3 G/DL
PT BLD: 11 SEC
RBC # BLD: 4.75 M/UL
RBC # FLD: 14.1 %
SODIUM SERPL-SCNC: 141 MMOL/L
WBC # FLD AUTO: 5.62 K/UL

## 2024-11-04 ENCOUNTER — NON-APPOINTMENT (OUTPATIENT)
Age: 49
End: 2024-11-04

## 2024-11-11 ENCOUNTER — APPOINTMENT (OUTPATIENT)
Age: 49
End: 2024-11-11
Payer: MEDICAID

## 2024-11-11 VITALS
HEIGHT: 69 IN | BODY MASS INDEX: 27.4 KG/M2 | WEIGHT: 185 LBS | HEART RATE: 66 BPM | DIASTOLIC BLOOD PRESSURE: 85 MMHG | SYSTOLIC BLOOD PRESSURE: 123 MMHG | OXYGEN SATURATION: 97 %

## 2024-11-11 DIAGNOSIS — M77.8 OTHER ENTHESOPATHIES, NOT ELSEWHERE CLASSIFIED: ICD-10-CM

## 2024-11-11 PROCEDURE — 99204 OFFICE O/P NEW MOD 45 MIN: CPT

## 2024-11-11 PROCEDURE — 76882 US LMTD JT/FCL EVL NVASC XTR: CPT

## 2024-11-11 PROCEDURE — 99214 OFFICE O/P EST MOD 30 MIN: CPT

## 2024-11-11 RX ORDER — DICLOFENAC SODIUM 75 MG/1
75 TABLET, DELAYED RELEASE ORAL
Qty: 28 | Refills: 1 | Status: ACTIVE | COMMUNITY
Start: 2024-11-11 | End: 1900-01-01

## 2024-12-09 ENCOUNTER — TRANSCRIPTION ENCOUNTER (OUTPATIENT)
Age: 49
End: 2024-12-09

## 2024-12-10 ENCOUNTER — NON-APPOINTMENT (OUTPATIENT)
Age: 49
End: 2024-12-10

## 2024-12-10 ENCOUNTER — APPOINTMENT (OUTPATIENT)
Dept: SURGERY | Facility: HOSPITAL | Age: 49
End: 2024-12-10

## 2024-12-11 ENCOUNTER — TRANSCRIPTION ENCOUNTER (OUTPATIENT)
Age: 49
End: 2024-12-11

## 2025-01-12 ENCOUNTER — EMERGENCY (EMERGENCY)
Facility: HOSPITAL | Age: 50
LOS: 1 days | Discharge: HOME | End: 2025-01-12
Attending: EMERGENCY MEDICINE | Admitting: EMERGENCY MEDICINE
Payer: MEDICAID

## 2025-01-12 VITALS
HEIGHT: 69 IN | HEART RATE: 87 BPM | TEMPERATURE: 98 F | WEIGHT: 182.98 LBS | OXYGEN SATURATION: 98 % | SYSTOLIC BLOOD PRESSURE: 118 MMHG | RESPIRATION RATE: 18 BRPM | DIASTOLIC BLOOD PRESSURE: 69 MMHG

## 2025-01-12 DIAGNOSIS — Z98.890 OTHER SPECIFIED POSTPROCEDURAL STATES: Chronic | ICD-10-CM

## 2025-01-12 LAB
APPEARANCE UR: ABNORMAL
BACTERIA # UR AUTO: ABNORMAL /HPF
BILIRUB UR-MCNC: NEGATIVE — SIGNIFICANT CHANGE UP
COLOR SPEC: SIGNIFICANT CHANGE UP
DIFF PNL FLD: ABNORMAL
EPI CELLS # UR: PRESENT
GLUCOSE UR QL: NEGATIVE MG/DL — SIGNIFICANT CHANGE UP
HCG UR QL: NEGATIVE — SIGNIFICANT CHANGE UP
KETONES UR-MCNC: 15 MG/DL
LEUKOCYTE ESTERASE UR-ACNC: NEGATIVE — SIGNIFICANT CHANGE UP
NITRITE UR-MCNC: NEGATIVE — SIGNIFICANT CHANGE UP
PH UR: 5.5 — SIGNIFICANT CHANGE UP (ref 5–8)
PROT UR-MCNC: ABNORMAL MG/DL
RBC CASTS # UR COMP ASSIST: 2 /HPF — SIGNIFICANT CHANGE UP (ref 0–4)
SP GR SPEC: 1.03 — SIGNIFICANT CHANGE UP (ref 1–1.03)
UROBILINOGEN FLD QL: 1 MG/DL — SIGNIFICANT CHANGE UP (ref 0.2–1)
WBC UR QL: 2 /HPF — SIGNIFICANT CHANGE UP (ref 0–5)

## 2025-01-12 PROCEDURE — 99284 EMERGENCY DEPT VISIT MOD MDM: CPT

## 2025-01-12 PROCEDURE — 99283 EMERGENCY DEPT VISIT LOW MDM: CPT

## 2025-01-12 RX ORDER — ONDANSETRON 4 MG/1
4 TABLET ORAL ONCE
Refills: 0 | Status: DISCONTINUED | OUTPATIENT
Start: 2025-01-12 | End: 2025-01-12

## 2025-01-12 RX ORDER — ONDANSETRON 4 MG/1
1 TABLET ORAL
Qty: 15 | Refills: 0
Start: 2025-01-12

## 2025-01-12 NOTE — ED PROVIDER NOTE - PATIENT PORTAL LINK FT
You can access the FollowMyHealth Patient Portal offered by [facility] by registering at the following website: [email or web address]. By joining [facility]’s FollowMyHealth portal, you will also be able to view your health information using other applications (apps) compatible with our system.

## 2025-01-12 NOTE — ED PROVIDER NOTE - NSFOLLOWUPINSTRUCTIONS_ED_ALL_ED_FT
Please follow-up with your primary care physician   please return if any changes or worsens.  Drink plenty of fluids eat some bland foods such as rice tea bananas apples etc.etc.  Use the Zofran as needed for nausea and vomiting

## 2025-01-12 NOTE — ED ADULT NURSE NOTE - NSFALLUNIVINTERV_ED_ALL_ED
Bed/Stretcher in lowest position, wheels locked, appropriate side rails in place/Call bell, personal items and telephone in reach/Instruct patient to call for assistance before getting out of bed/chair/stretcher/Non-slip footwear applied when patient is off stretcher/[place] to call system/Physically safe environment - no spills, clutter or unnecessary equipment/Purposeful proactive rounding/Room/bathroom lighting operational, light cord in reach
[facility]

## 2025-01-12 NOTE — ED PROVIDER NOTE - CLINICAL SUMMARY MEDICAL DECISION MAKING FREE TEXT BOX
healthy female with abdominal discomfort nausea vomiting diarrhea consistent with viral gastroenteritis.  There is a significant quantity of Norvasc around the moment.  She is well-appearing with moist mucous membranes good eye contact good energy and good vital signs.  Recommend symptomatic treatment and discharge.  She reports positive relief with Zofran given to her by the  urgent care office.

## 2025-01-13 LAB
N GONORRHOEA RRNA SPEC QL NAA+PROBE: SIGNIFICANT CHANGE UP
SPECIMEN SOURCE: SIGNIFICANT CHANGE UP

## 2025-01-14 ENCOUNTER — APPOINTMENT (OUTPATIENT)
Dept: INTERNAL MEDICINE | Facility: CLINIC | Age: 50
End: 2025-01-14

## 2025-01-16 ENCOUNTER — APPOINTMENT (OUTPATIENT)
Dept: OBGYN | Facility: CLINIC | Age: 50
End: 2025-01-16

## 2025-01-28 ENCOUNTER — NON-APPOINTMENT (OUTPATIENT)
Age: 50
End: 2025-01-28

## 2025-01-28 ENCOUNTER — APPOINTMENT (OUTPATIENT)
Dept: OBGYN | Facility: CLINIC | Age: 50
End: 2025-01-28
Payer: MEDICAID

## 2025-01-28 ENCOUNTER — OUTPATIENT (OUTPATIENT)
Dept: OUTPATIENT SERVICES | Facility: HOSPITAL | Age: 50
LOS: 1 days | End: 2025-01-28
Payer: MEDICAID

## 2025-01-28 VITALS
RESPIRATION RATE: 18 BRPM | SYSTOLIC BLOOD PRESSURE: 117 MMHG | OXYGEN SATURATION: 99 % | BODY MASS INDEX: 26.81 KG/M2 | HEART RATE: 68 BPM | WEIGHT: 181 LBS | DIASTOLIC BLOOD PRESSURE: 80 MMHG | TEMPERATURE: 98.1 F | HEIGHT: 69 IN

## 2025-01-28 DIAGNOSIS — Z78.9 OTHER SPECIFIED HEALTH STATUS: ICD-10-CM

## 2025-01-28 DIAGNOSIS — Z86.39 PERSONAL HISTORY OF OTHER ENDOCRINE, NUTRITIONAL AND METABOLIC DISEASE: ICD-10-CM

## 2025-01-28 DIAGNOSIS — Z86.69 PERSONAL HISTORY OF OTHER DISEASES OF THE NERVOUS SYSTEM AND SENSE ORGANS: ICD-10-CM

## 2025-01-28 DIAGNOSIS — N83.8 OTHER NONINFLAMMATORY DISORDERS OF OVARY, FALLOPIAN TUBE AND BROAD LIGAMENT: ICD-10-CM

## 2025-01-28 DIAGNOSIS — Z98.890 OTHER SPECIFIED POSTPROCEDURAL STATES: Chronic | ICD-10-CM

## 2025-01-28 DIAGNOSIS — Z86.018 PERSONAL HISTORY OF OTHER BENIGN NEOPLASM: ICD-10-CM

## 2025-01-28 DIAGNOSIS — Z87.42 PERSONAL HISTORY OF OTHER DISEASES OF THE FEMALE GENITAL TRACT: ICD-10-CM

## 2025-01-28 DIAGNOSIS — E55.9 VITAMIN D DEFICIENCY, UNSPECIFIED: ICD-10-CM

## 2025-01-28 DIAGNOSIS — N97.9 FEMALE INFERTILITY, UNSPECIFIED: ICD-10-CM

## 2025-01-28 DIAGNOSIS — M25.512 PAIN IN LEFT SHOULDER: ICD-10-CM

## 2025-01-28 DIAGNOSIS — Z00.00 ENCOUNTER FOR GENERAL ADULT MEDICAL EXAMINATION WITHOUT ABNORMAL FINDINGS: ICD-10-CM

## 2025-01-28 DIAGNOSIS — K86.2 CYST OF PANCREAS: ICD-10-CM

## 2025-01-28 DIAGNOSIS — Z86.59 PERSONAL HISTORY OF OTHER MENTAL AND BEHAVIORAL DISORDERS: ICD-10-CM

## 2025-01-28 DIAGNOSIS — R92.30 DENSE BREASTS, UNSPECIFIED: ICD-10-CM

## 2025-01-28 DIAGNOSIS — R73.09 OTHER ABNORMAL GLUCOSE: ICD-10-CM

## 2025-01-28 DIAGNOSIS — Z01.818 ENCOUNTER FOR OTHER PREPROCEDURAL EXAMINATION: ICD-10-CM

## 2025-01-28 DIAGNOSIS — K21.9 GASTRO-ESOPHAGEAL REFLUX DISEASE W/OUT ESOPHAGITIS: ICD-10-CM

## 2025-01-28 DIAGNOSIS — Z87.2 PERSONAL HISTORY OF DISEASES OF THE SKIN AND SUBCUTANEOUS TISSUE: ICD-10-CM

## 2025-01-28 DIAGNOSIS — R31.29 OTHER MICROSCOPIC HEMATURIA: ICD-10-CM

## 2025-01-28 DIAGNOSIS — K29.30 CHRONIC SUPERFICIAL GASTRITIS W/OUT BLEEDING: ICD-10-CM

## 2025-01-28 DIAGNOSIS — Z01.419 ENCOUNTER FOR GYNECOLOGICAL EXAMINATION (GENERAL) (ROUTINE) W/OUT ABNORMAL FINDINGS: ICD-10-CM

## 2025-01-28 DIAGNOSIS — Z31.69 ENCOUNTER FOR OTHER GENERAL COUNSELING AND ADVICE ON PROCREATION: ICD-10-CM

## 2025-01-28 DIAGNOSIS — M77.8 OTHER ENTHESOPATHIES, NOT ELSEWHERE CLASSIFIED: ICD-10-CM

## 2025-01-28 DIAGNOSIS — Z12.39 ENCOUNTER FOR OTHER SCREENING FOR MALIGNANT NEOPLASM OF BREAST: ICD-10-CM

## 2025-01-28 DIAGNOSIS — M47.817 SPONDYLOSIS W/OUT MYELOPATHY OR RADICULOPATHY, LUMBOSACRAL REGION: ICD-10-CM

## 2025-01-28 DIAGNOSIS — G43.909 MIGRAINE, UNSPECIFIED, NOT INTRACTABLE, W/OUT STATUS MIGRAINOSUS: ICD-10-CM

## 2025-01-28 DIAGNOSIS — K59.04 CHRONIC IDIOPATHIC CONSTIPATION: ICD-10-CM

## 2025-01-28 DIAGNOSIS — Z87.09 PERSONAL HISTORY OF OTHER DISEASES OF THE RESPIRATORY SYSTEM: ICD-10-CM

## 2025-01-28 DIAGNOSIS — Z11.3 ENCOUNTER FOR SCREENING FOR INFECTIONS WITH A PREDOMINANTLY SEXUAL MODE OF TRANSMISSION: ICD-10-CM

## 2025-01-28 DIAGNOSIS — R22.41 LOCALIZED SWELLING, MASS AND LUMP, RIGHT LOWER LIMB: ICD-10-CM

## 2025-01-28 PROCEDURE — 87591 N.GONORRHOEAE DNA AMP PROB: CPT

## 2025-01-28 PROCEDURE — 84439 ASSAY OF FREE THYROXINE: CPT

## 2025-01-28 PROCEDURE — 84443 ASSAY THYROID STIM HORMONE: CPT

## 2025-01-28 PROCEDURE — 99214 OFFICE O/P EST MOD 30 MIN: CPT

## 2025-01-28 PROCEDURE — 83001 ASSAY OF GONADOTROPIN (FSH): CPT

## 2025-01-28 PROCEDURE — 81513 NFCT DS BV RNA VAG FLU ALG: CPT

## 2025-01-28 PROCEDURE — 80053 COMPREHEN METABOLIC PANEL: CPT

## 2025-01-28 PROCEDURE — G0463: CPT

## 2025-01-28 PROCEDURE — 83036 HEMOGLOBIN GLYCOSYLATED A1C: CPT

## 2025-01-28 PROCEDURE — 87491 CHLMYD TRACH DNA AMP PROBE: CPT

## 2025-01-28 PROCEDURE — 84702 CHORIONIC GONADOTROPIN TEST: CPT

## 2025-01-28 PROCEDURE — 36415 COLL VENOUS BLD VENIPUNCTURE: CPT

## 2025-01-28 PROCEDURE — 85025 COMPLETE CBC W/AUTO DIFF WBC: CPT

## 2025-01-28 PROCEDURE — 84146 ASSAY OF PROLACTIN: CPT

## 2025-01-28 PROCEDURE — 87661 TRICHOMONAS VAGINALIS AMPLIF: CPT

## 2025-01-28 PROCEDURE — 87481 CANDIDA DNA AMP PROBE: CPT

## 2025-01-28 RX ORDER — PNV NO.95/FERROUS FUM/FOLIC AC 28MG-0.8MG
27-0.8-25 TABLET ORAL
Qty: 30 | Refills: 11 | Status: ACTIVE | COMMUNITY
Start: 2025-01-28 | End: 1900-01-01

## 2025-01-29 DIAGNOSIS — Z12.39 ENCOUNTER FOR OTHER SCREENING FOR MALIGNANT NEOPLASM OF BREAST: ICD-10-CM

## 2025-01-29 DIAGNOSIS — Z78.9 OTHER SPECIFIED HEALTH STATUS: ICD-10-CM

## 2025-01-29 DIAGNOSIS — Z11.3 ENCOUNTER FOR SCREENING FOR INFECTIONS WITH A PREDOMINANTLY SEXUAL MODE OF TRANSMISSION: ICD-10-CM

## 2025-01-29 DIAGNOSIS — Z01.419 ENCOUNTER FOR GYNECOLOGICAL EXAMINATION (GENERAL) (ROUTINE) WITHOUT ABNORMAL FINDINGS: ICD-10-CM

## 2025-01-29 DIAGNOSIS — Z31.69 ENCOUNTER FOR OTHER GENERAL COUNSELING AND ADVICE ON PROCREATION: ICD-10-CM

## 2025-01-29 DIAGNOSIS — N97.9 FEMALE INFERTILITY, UNSPECIFIED: ICD-10-CM

## 2025-01-29 DIAGNOSIS — E55.9 VITAMIN D DEFICIENCY, UNSPECIFIED: ICD-10-CM

## 2025-01-29 DIAGNOSIS — Z86.018 PERSONAL HISTORY OF OTHER BENIGN NEOPLASM: ICD-10-CM

## 2025-01-29 LAB
ALBUMIN SERPL ELPH-MCNC: 4.5 G/DL
ALP BLD-CCNC: 72 U/L
ALT SERPL-CCNC: 31 U/L
ANION GAP SERPL CALC-SCNC: 13 MMOL/L
AST SERPL-CCNC: 26 U/L
BASOPHILS # BLD AUTO: 0.05 K/UL
BASOPHILS NFR BLD AUTO: 1.1 %
BILIRUB SERPL-MCNC: 0.6 MG/DL
BUN SERPL-MCNC: 15 MG/DL
BV BACTERIA RRNA VAG QL NAA+PROBE: NOT DETECTED
C GLABRATA RNA VAG QL NAA+PROBE: NOT DETECTED
C TRACH RRNA SPEC QL NAA+PROBE: NOT DETECTED
CALCIUM SERPL-MCNC: 10.9 MG/DL
CANDIDA RRNA VAG QL PROBE: NOT DETECTED
CHLORIDE SERPL-SCNC: 104 MMOL/L
CO2 SERPL-SCNC: 23 MMOL/L
CREAT SERPL-MCNC: 0.68 MG/DL
EGFR: 107 ML/MIN/1.73M2
EOSINOPHIL # BLD AUTO: 0.14 K/UL
EOSINOPHIL NFR BLD AUTO: 3.2 %
ESTIMATED AVERAGE GLUCOSE: 108 MG/DL
FSH SERPL-MCNC: 37.7 IU/L
GLUCOSE SERPL-MCNC: 91 MG/DL
HBA1C MFR BLD HPLC: 5.4 %
HCG SERPL-MCNC: <1 MIU/ML
HCT VFR BLD CALC: 46.1 %
HGB BLD-MCNC: 14.9 G/DL
IMM GRANULOCYTES NFR BLD AUTO: 0.2 %
LYMPHOCYTES # BLD AUTO: 1.51 K/UL
LYMPHOCYTES NFR BLD AUTO: 34.7 %
MAN DIFF?: NORMAL
MCHC RBC-ENTMCNC: 28.8 PG
MCHC RBC-ENTMCNC: 32.3 G/DL
MCV RBC AUTO: 89 FL
MONOCYTES # BLD AUTO: 0.4 K/UL
MONOCYTES NFR BLD AUTO: 9.2 %
N GONORRHOEA RRNA SPEC QL NAA+PROBE: NOT DETECTED
NEUTROPHILS # BLD AUTO: 2.24 K/UL
NEUTROPHILS NFR BLD AUTO: 51.6 %
PLATELET # BLD AUTO: 229 K/UL
POTASSIUM SERPL-SCNC: 4.4 MMOL/L
PROLACTIN SERPL-MCNC: 12.9 NG/ML
PROT SERPL-MCNC: 7.3 G/DL
RBC # BLD: 5.18 M/UL
RBC # FLD: 13.7 %
SODIUM SERPL-SCNC: 141 MMOL/L
T VAGINALIS RRNA SPEC QL NAA+PROBE: NOT DETECTED
T4 FREE SERPL-MCNC: 1.2 NG/DL
TSH SERPL-ACNC: 1.43 UIU/ML
WBC # FLD AUTO: 4.35 K/UL

## 2025-02-28 ENCOUNTER — APPOINTMENT (OUTPATIENT)
Dept: OBGYN | Facility: CLINIC | Age: 50
End: 2025-02-28

## 2025-03-17 ENCOUNTER — APPOINTMENT (OUTPATIENT)
Dept: ULTRASOUND IMAGING | Facility: CLINIC | Age: 50
End: 2025-03-17
Payer: MEDICAID

## 2025-03-17 ENCOUNTER — APPOINTMENT (OUTPATIENT)
Dept: MAMMOGRAPHY | Facility: CLINIC | Age: 50
End: 2025-03-17
Payer: MEDICAID

## 2025-03-17 ENCOUNTER — RESULT REVIEW (OUTPATIENT)
Age: 50
End: 2025-03-17

## 2025-03-17 PROCEDURE — 77063 BREAST TOMOSYNTHESIS BI: CPT

## 2025-03-17 PROCEDURE — 76830 TRANSVAGINAL US NON-OB: CPT

## 2025-03-17 PROCEDURE — 77067 SCR MAMMO BI INCL CAD: CPT

## 2025-03-17 PROCEDURE — 76856 US EXAM PELVIC COMPLETE: CPT

## 2025-03-28 ENCOUNTER — APPOINTMENT (OUTPATIENT)
Dept: OBGYN | Facility: CLINIC | Age: 50
End: 2025-03-28

## 2025-04-09 ENCOUNTER — APPOINTMENT (OUTPATIENT)
Dept: OBGYN | Facility: CLINIC | Age: 50
End: 2025-04-09

## 2025-04-09 VITALS
OXYGEN SATURATION: 99 % | DIASTOLIC BLOOD PRESSURE: 80 MMHG | TEMPERATURE: 98 F | SYSTOLIC BLOOD PRESSURE: 139 MMHG | HEIGHT: 69 IN | HEART RATE: 72 BPM | BODY MASS INDEX: 27.25 KG/M2 | RESPIRATION RATE: 18 BRPM | WEIGHT: 184 LBS

## 2025-04-09 DIAGNOSIS — Z86.018 PERSONAL HISTORY OF OTHER BENIGN NEOPLASM: ICD-10-CM

## 2025-04-09 DIAGNOSIS — Z11.3 ENCOUNTER FOR SCREENING FOR INFECTIONS WITH A PREDOMINANTLY SEXUAL MODE OF TRANSMISSION: ICD-10-CM

## 2025-04-09 DIAGNOSIS — N97.9 FEMALE INFERTILITY, UNSPECIFIED: ICD-10-CM

## 2025-04-09 DIAGNOSIS — Z71.2 PERSON CONSULTING FOR EXPLANATION OF EXAMINATION OR TEST FINDINGS: ICD-10-CM

## 2025-04-09 DIAGNOSIS — E55.9 VITAMIN D DEFICIENCY, UNSPECIFIED: ICD-10-CM

## 2025-04-09 DIAGNOSIS — Z31.69 ENCOUNTER FOR OTHER GENERAL COUNSELING AND ADVICE ON PROCREATION: ICD-10-CM

## 2025-04-09 DIAGNOSIS — Z01.419 ENCOUNTER FOR GYNECOLOGICAL EXAMINATION (GENERAL) (ROUTINE) W/OUT ABNORMAL FINDINGS: ICD-10-CM

## 2025-04-09 DIAGNOSIS — Z12.39 ENCOUNTER FOR OTHER SCREENING FOR MALIGNANT NEOPLASM OF BREAST: ICD-10-CM

## 2025-04-12 PROBLEM — Z71.2 ENCOUNTER TO DISCUSS TEST RESULTS: Status: ACTIVE | Noted: 2025-04-09

## 2025-04-16 ENCOUNTER — APPOINTMENT (OUTPATIENT)
Dept: OBGYN | Facility: CLINIC | Age: 50
End: 2025-04-16
Payer: MEDICAID

## 2025-04-16 ENCOUNTER — OUTPATIENT (OUTPATIENT)
Dept: OUTPATIENT SERVICES | Facility: HOSPITAL | Age: 50
LOS: 1 days | End: 2025-04-16
Payer: MEDICAID

## 2025-04-16 VITALS
WEIGHT: 185 LBS | DIASTOLIC BLOOD PRESSURE: 81 MMHG | RESPIRATION RATE: 18 BRPM | BODY MASS INDEX: 27.4 KG/M2 | SYSTOLIC BLOOD PRESSURE: 127 MMHG | OXYGEN SATURATION: 98 % | TEMPERATURE: 97.7 F | HEART RATE: 65 BPM | HEIGHT: 69 IN

## 2025-04-16 DIAGNOSIS — Z00.00 ENCOUNTER FOR GENERAL ADULT MEDICAL EXAMINATION WITHOUT ABNORMAL FINDINGS: ICD-10-CM

## 2025-04-16 DIAGNOSIS — Z98.890 OTHER SPECIFIED POSTPROCEDURAL STATES: Chronic | ICD-10-CM

## 2025-04-16 PROCEDURE — G0463: CPT

## 2025-04-16 PROCEDURE — 99213 OFFICE O/P EST LOW 20 MIN: CPT

## 2025-04-17 DIAGNOSIS — Z71.2 PERSON CONSULTING FOR EXPLANATION OF EXAMINATION OR TEST FINDINGS: ICD-10-CM

## 2025-05-21 NOTE — ED ADULT NURSE NOTE - NS ED NURSE DC PT EDUCATION RESOURCES
"Form filled out and placed in \"MA/TC TO DO\" bin.   "
Form faxed and sent to scanning.     
INCOMING FORMS    Sender: Miles Days     Type of Form, letter or note (What is requested?): Speech treatment plan    How was the form received?: Fax    How should forms be returned?:  Fax : [phone]    Form placed in PK bin for review/signature if appropriate.    
Yes

## 2025-06-02 NOTE — ED ADULT TRIAGE NOTE - ARRIVAL FROM
Home

## 2025-08-22 ENCOUNTER — APPOINTMENT (OUTPATIENT)
Dept: OBGYN | Facility: CLINIC | Age: 50
End: 2025-08-22

## 2025-09-09 ENCOUNTER — APPOINTMENT (OUTPATIENT)
Dept: INTERNAL MEDICINE | Facility: CLINIC | Age: 50
End: 2025-09-09

## 2025-09-10 DIAGNOSIS — R42 DIZZINESS AND GIDDINESS: ICD-10-CM

## 2025-09-10 DIAGNOSIS — M54.2 CERVICALGIA: ICD-10-CM

## 2025-09-14 ENCOUNTER — EMERGENCY (EMERGENCY)
Facility: HOSPITAL | Age: 50
LOS: 1 days | End: 2025-09-14
Attending: EMERGENCY MEDICINE
Payer: MEDICAID

## 2025-09-14 VITALS
WEIGHT: 199.74 LBS | OXYGEN SATURATION: 97 % | HEIGHT: 69 IN | DIASTOLIC BLOOD PRESSURE: 84 MMHG | TEMPERATURE: 97 F | SYSTOLIC BLOOD PRESSURE: 153 MMHG | RESPIRATION RATE: 18 BRPM | HEART RATE: 80 BPM

## 2025-09-14 VITALS
TEMPERATURE: 98 F | OXYGEN SATURATION: 98 % | DIASTOLIC BLOOD PRESSURE: 81 MMHG | SYSTOLIC BLOOD PRESSURE: 148 MMHG | RESPIRATION RATE: 18 BRPM | HEART RATE: 76 BPM

## 2025-09-14 DIAGNOSIS — Z98.890 OTHER SPECIFIED POSTPROCEDURAL STATES: Chronic | ICD-10-CM

## 2025-09-14 LAB
ALBUMIN SERPL ELPH-MCNC: 3.8 G/DL — SIGNIFICANT CHANGE UP (ref 3.5–5)
ALP SERPL-CCNC: 78 U/L — SIGNIFICANT CHANGE UP (ref 40–120)
ALT FLD-CCNC: 43 U/L DA — SIGNIFICANT CHANGE UP (ref 10–60)
ANION GAP SERPL CALC-SCNC: 4 MMOL/L — LOW (ref 5–17)
APPEARANCE UR: CLEAR — SIGNIFICANT CHANGE UP
AST SERPL-CCNC: 28 U/L — SIGNIFICANT CHANGE UP (ref 10–40)
BACTERIA # UR AUTO: ABNORMAL /HPF
BASOPHILS # BLD AUTO: 0.05 K/UL — SIGNIFICANT CHANGE UP (ref 0–0.2)
BASOPHILS NFR BLD AUTO: 0.7 % — SIGNIFICANT CHANGE UP (ref 0–2)
BILIRUB SERPL-MCNC: 0.3 MG/DL — SIGNIFICANT CHANGE UP (ref 0.2–1.2)
BILIRUB UR-MCNC: NEGATIVE — SIGNIFICANT CHANGE UP
BUN SERPL-MCNC: 21 MG/DL — HIGH (ref 7–18)
CALCIUM SERPL-MCNC: 10.5 MG/DL — SIGNIFICANT CHANGE UP (ref 8.4–10.5)
CHLORIDE SERPL-SCNC: 107 MMOL/L — SIGNIFICANT CHANGE UP (ref 96–108)
CO2 SERPL-SCNC: 29 MMOL/L — SIGNIFICANT CHANGE UP (ref 22–31)
COLOR SPEC: YELLOW — SIGNIFICANT CHANGE UP
CREAT SERPL-MCNC: 0.89 MG/DL — SIGNIFICANT CHANGE UP (ref 0.5–1.3)
DIFF PNL FLD: NEGATIVE — SIGNIFICANT CHANGE UP
EGFR: 79 ML/MIN/1.73M2 — SIGNIFICANT CHANGE UP
EGFR: 79 ML/MIN/1.73M2 — SIGNIFICANT CHANGE UP
EOSINOPHIL # BLD AUTO: 0.14 K/UL — SIGNIFICANT CHANGE UP (ref 0–0.5)
EOSINOPHIL NFR BLD AUTO: 2 % — SIGNIFICANT CHANGE UP (ref 0–6)
EPI CELLS # UR: PRESENT
GLUCOSE SERPL-MCNC: 95 MG/DL — SIGNIFICANT CHANGE UP (ref 70–99)
GLUCOSE UR QL: NEGATIVE MG/DL — SIGNIFICANT CHANGE UP
HCG SERPL-ACNC: <1 MIU/ML — SIGNIFICANT CHANGE UP
HCG UR QL: POSITIVE
HCT VFR BLD CALC: 44.5 % — SIGNIFICANT CHANGE UP (ref 34.5–45)
HGB BLD-MCNC: 14.4 G/DL — SIGNIFICANT CHANGE UP (ref 11.5–15.5)
IMM GRANULOCYTES NFR BLD AUTO: 0.1 % — SIGNIFICANT CHANGE UP (ref 0–0.9)
KETONES UR QL: NEGATIVE MG/DL — SIGNIFICANT CHANGE UP
LEUKOCYTE ESTERASE UR-ACNC: ABNORMAL
LYMPHOCYTES # BLD AUTO: 2.14 K/UL — SIGNIFICANT CHANGE UP (ref 1–3.3)
LYMPHOCYTES # BLD AUTO: 30.8 % — SIGNIFICANT CHANGE UP (ref 13–44)
MCHC RBC-ENTMCNC: 28.9 PG — SIGNIFICANT CHANGE UP (ref 27–34)
MCHC RBC-ENTMCNC: 32.4 G/DL — SIGNIFICANT CHANGE UP (ref 32–36)
MCV RBC AUTO: 89.4 FL — SIGNIFICANT CHANGE UP (ref 80–100)
MONOCYTES # BLD AUTO: 0.7 K/UL — SIGNIFICANT CHANGE UP (ref 0–0.9)
MONOCYTES NFR BLD AUTO: 10.1 % — SIGNIFICANT CHANGE UP (ref 2–14)
NEUTROPHILS # BLD AUTO: 3.91 K/UL — SIGNIFICANT CHANGE UP (ref 1.8–7.4)
NEUTROPHILS NFR BLD AUTO: 56.3 % — SIGNIFICANT CHANGE UP (ref 43–77)
NITRITE UR-MCNC: NEGATIVE — SIGNIFICANT CHANGE UP
NRBC BLD AUTO-RTO: 0 /100 WBCS — SIGNIFICANT CHANGE UP (ref 0–0)
PH UR: 5.5 — SIGNIFICANT CHANGE UP (ref 5–8)
PLATELET # BLD AUTO: 196 K/UL — SIGNIFICANT CHANGE UP (ref 150–400)
POTASSIUM SERPL-MCNC: 3.8 MMOL/L — SIGNIFICANT CHANGE UP (ref 3.5–5.3)
POTASSIUM SERPL-SCNC: 3.8 MMOL/L — SIGNIFICANT CHANGE UP (ref 3.5–5.3)
PROT SERPL-MCNC: 7.4 G/DL — SIGNIFICANT CHANGE UP (ref 6–8.3)
PROT UR-MCNC: NEGATIVE MG/DL — SIGNIFICANT CHANGE UP
RBC # BLD: 4.98 M/UL — SIGNIFICANT CHANGE UP (ref 3.8–5.2)
RBC # FLD: 13.5 % — SIGNIFICANT CHANGE UP (ref 10.3–14.5)
RBC CASTS # UR COMP ASSIST: 0 /HPF — SIGNIFICANT CHANGE UP (ref 0–4)
SODIUM SERPL-SCNC: 140 MMOL/L — SIGNIFICANT CHANGE UP (ref 135–145)
SP GR SPEC: 1.02 — SIGNIFICANT CHANGE UP (ref 1–1.03)
UROBILINOGEN FLD QL: 1 MG/DL — SIGNIFICANT CHANGE UP (ref 0.2–1)
WBC # BLD: 6.95 K/UL — SIGNIFICANT CHANGE UP (ref 3.8–10.5)
WBC # FLD AUTO: 6.95 K/UL — SIGNIFICANT CHANGE UP (ref 3.8–10.5)
WBC UR QL: 3 /HPF — SIGNIFICANT CHANGE UP (ref 0–5)

## 2025-09-14 PROCEDURE — 99284 EMERGENCY DEPT VISIT MOD MDM: CPT

## 2025-09-14 PROCEDURE — 84702 CHORIONIC GONADOTROPIN TEST: CPT

## 2025-09-14 PROCEDURE — 36415 COLL VENOUS BLD VENIPUNCTURE: CPT

## 2025-09-14 PROCEDURE — 87086 URINE CULTURE/COLONY COUNT: CPT

## 2025-09-14 PROCEDURE — 99283 EMERGENCY DEPT VISIT LOW MDM: CPT

## 2025-09-14 PROCEDURE — 81001 URINALYSIS AUTO W/SCOPE: CPT

## 2025-09-14 PROCEDURE — 81025 URINE PREGNANCY TEST: CPT

## 2025-09-14 PROCEDURE — 80053 COMPREHEN METABOLIC PANEL: CPT

## 2025-09-14 PROCEDURE — 85025 COMPLETE CBC W/AUTO DIFF WBC: CPT

## 2025-09-16 LAB
CULTURE RESULTS: SIGNIFICANT CHANGE UP
SPECIMEN SOURCE: SIGNIFICANT CHANGE UP

## (undated) DEVICE — MASK OXYGEN PANORAMIC

## (undated) DEVICE — FORCEP BIOPSY 2.5MM DISP

## (undated) DEVICE — SOLIDIFIER ISOLYZER 2000 CC

## (undated) DEVICE — KIT ENDO PROCEDURE CUST W/VLV

## (undated) DEVICE — RETRIEVER ROTH NET PLATINUM-UNIVERSAL

## (undated) DEVICE — VENODYNE/SCD SLEEVE CALF MEDIUM

## (undated) DEVICE — TUBING CANNULA SALTER LABS NASAL ADULT 7FT

## (undated) DEVICE — SYR LUER LOK 50CC

## (undated) DEVICE — TUBING IV SET GRAVITY 3Y 100" MACRO

## (undated) DEVICE — NDL INJ SCLERO INTERJECT 23G

## (undated) DEVICE — CATH ELCTR GLIDE PRB 7FR

## (undated) DEVICE — BIOPSY FORCEP RADIAL JAW 4 STANDARD WITH NEEDLE

## (undated) DEVICE — BITE BLOCK ADULT 20 X 27MM (GREEN)

## (undated) DEVICE — CLAMP BX HOT RAD JAW 3

## (undated) DEVICE — DRSG CURITY GAUZE SPONGE 4 X 4" 12-PLY

## (undated) DEVICE — SENSOR O2 FINGER ADULT

## (undated) DEVICE — STERIS DEFENDO 3-PIECE KIT (AIR/WATER, SUCTION & BIOPSY VALVES)

## (undated) DEVICE — SOL INJ NS 0.9% 500ML 1-PORT

## (undated) DEVICE — ADAPTER ENDO CHNL SINGLE USE

## (undated) DEVICE — CONTAINER FORMALIN 10% 20ML

## (undated) DEVICE — SNARE LOOP POLY DISP 30MM LOOP

## (undated) DEVICE — LUBRICATING JELLY ONESHOT 1.25OZ

## (undated) DEVICE — TUBING MEDI-VAC W MAXIGRIP CONNECTORS 1/4"X6'

## (undated) DEVICE — Device